# Patient Record
Sex: FEMALE
[De-identification: names, ages, dates, MRNs, and addresses within clinical notes are randomized per-mention and may not be internally consistent; named-entity substitution may affect disease eponyms.]

---

## 2021-02-10 PROBLEM — Z00.00 ENCOUNTER FOR PREVENTIVE HEALTH EXAMINATION: Status: ACTIVE | Noted: 2021-02-10

## 2021-03-16 ENCOUNTER — APPOINTMENT (OUTPATIENT)
Dept: OBGYN | Facility: CLINIC | Age: 41
End: 2021-03-16
Payer: COMMERCIAL

## 2021-03-16 ENCOUNTER — ASOB RESULT (OUTPATIENT)
Age: 41
End: 2021-03-16

## 2021-03-16 VITALS
HEIGHT: 63 IN | TEMPERATURE: 97.9 F | BODY MASS INDEX: 23.04 KG/M2 | DIASTOLIC BLOOD PRESSURE: 83 MMHG | HEART RATE: 78 BPM | SYSTOLIC BLOOD PRESSURE: 151 MMHG | WEIGHT: 130 LBS

## 2021-03-16 DIAGNOSIS — Z82.49 FAMILY HISTORY OF ISCHEMIC HEART DISEASE AND OTHER DISEASES OF THE CIRCULATORY SYSTEM: ICD-10-CM

## 2021-03-16 DIAGNOSIS — Z78.9 OTHER SPECIFIED HEALTH STATUS: ICD-10-CM

## 2021-03-16 DIAGNOSIS — Z83.3 FAMILY HISTORY OF DIABETES MELLITUS: ICD-10-CM

## 2021-03-16 PROCEDURE — 76801 OB US < 14 WKS SINGLE FETUS: CPT

## 2021-03-16 PROCEDURE — 99072 ADDL SUPL MATRL&STAF TM PHE: CPT

## 2021-03-16 PROCEDURE — 99203 OFFICE O/P NEW LOW 30 MIN: CPT

## 2021-03-18 PROBLEM — Z83.3 FAMILY HISTORY OF DIABETES MELLITUS: Status: ACTIVE | Noted: 2021-03-18

## 2021-03-18 PROBLEM — Z82.49 FAMILY HISTORY OF HYPERTENSION: Status: ACTIVE | Noted: 2021-03-18

## 2021-03-18 PROBLEM — Z78.9 DOES NOT USE ILLICIT DRUGS: Status: ACTIVE | Noted: 2021-03-18

## 2021-03-18 LAB
ABO + RH PNL BLD: NORMAL
ALBUMIN SERPL ELPH-MCNC: 4.3 G/DL
ALP BLD-CCNC: 43 U/L
ALT SERPL-CCNC: 10 U/L
ANION GAP SERPL CALC-SCNC: 13 MMOL/L
AST SERPL-CCNC: 15 U/L
B19V IGG SER QL IA: 8.69 INDEX
B19V IGG+IGM SER-IMP: NORMAL
B19V IGG+IGM SER-IMP: POSITIVE
B19V IGM FLD-ACNC: 0.16 INDEX
B19V IGM SER-ACNC: NEGATIVE
BACTERIA UR CULT: NORMAL
BASOPHILS # BLD AUTO: 0.06 K/UL
BASOPHILS NFR BLD AUTO: 0.6 %
BILIRUB SERPL-MCNC: 0.3 MG/DL
BLD GP AB SCN SERPL QL: NORMAL
BUN SERPL-MCNC: 12 MG/DL
C TRACH RRNA SPEC QL NAA+PROBE: NOT DETECTED
CALCIUM SERPL-MCNC: 9.6 MG/DL
CHLORIDE SERPL-SCNC: 99 MMOL/L
CMV IGG SERPL QL: 2.1 U/ML
CMV IGG SERPL-IMP: POSITIVE
CMV IGM SERPL QL: <8 AU/ML
CMV IGM SERPL QL: NEGATIVE
CO2 SERPL-SCNC: 23 MMOL/L
CREAT SERPL-MCNC: 0.63 MG/DL
EOSINOPHIL # BLD AUTO: 0.15 K/UL
EOSINOPHIL NFR BLD AUTO: 1.5 %
GLUCOSE SERPL-MCNC: 46 MG/DL
HBV SURFACE AG SER QL: NONREACTIVE
HCT VFR BLD CALC: 37.7 %
HCV AB SER QL: NONREACTIVE
HCV S/CO RATIO: 0.07 S/CO
HGB A MFR BLD: 97.1 %
HGB A2 MFR BLD: 2.9 %
HGB BLD-MCNC: 12.2 G/DL
HGB FRACT BLD-IMP: NORMAL
HIV1+2 AB SPEC QL IA.RAPID: NONREACTIVE
IMM GRANULOCYTES NFR BLD AUTO: 0.4 %
LEAD BLD-MCNC: <1 UG/DL
LYMPHOCYTES # BLD AUTO: 2.22 K/UL
LYMPHOCYTES NFR BLD AUTO: 22.7 %
MAN DIFF?: NORMAL
MCHC RBC-ENTMCNC: 30.3 PG
MCHC RBC-ENTMCNC: 32.4 GM/DL
MCV RBC AUTO: 93.8 FL
MONOCYTES # BLD AUTO: 0.9 K/UL
MONOCYTES NFR BLD AUTO: 9.2 %
N GONORRHOEA RRNA SPEC QL NAA+PROBE: NOT DETECTED
NEUTROPHILS # BLD AUTO: 6.43 K/UL
NEUTROPHILS NFR BLD AUTO: 65.6 %
PLATELET # BLD AUTO: 321 K/UL
POTASSIUM SERPL-SCNC: 3.9 MMOL/L
PROT SERPL-MCNC: 6.5 G/DL
RBC # BLD: 4.02 M/UL
RBC # FLD: 13.1 %
RUBV IGG FLD-ACNC: 9.6 INDEX
RUBV IGG SER-IMP: POSITIVE
SODIUM SERPL-SCNC: 135 MMOL/L
SOURCE AMPLIFICATION: NORMAL
T PALLIDUM AB SER QL IA: NEGATIVE
URATE SERPL-MCNC: 2.8 MG/DL
VZV AB TITR SER: POSITIVE
VZV IGG SER IF-ACNC: 2013 INDEX
WBC # FLD AUTO: 9.8 K/UL

## 2021-03-18 RX ORDER — LABETALOL HYDROCHLORIDE 100 MG/1
100 TABLET, FILM COATED ORAL
Qty: 60 | Refills: 0 | Status: DISCONTINUED | COMMUNITY
Start: 2020-09-28 | End: 2021-03-18

## 2021-03-25 ENCOUNTER — TRANSCRIPTION ENCOUNTER (OUTPATIENT)
Age: 41
End: 2021-03-25

## 2021-03-29 ENCOUNTER — NON-APPOINTMENT (OUTPATIENT)
Age: 41
End: 2021-03-29

## 2021-04-01 ENCOUNTER — TRANSCRIPTION ENCOUNTER (OUTPATIENT)
Age: 41
End: 2021-04-01

## 2021-04-22 ENCOUNTER — NON-APPOINTMENT (OUTPATIENT)
Age: 41
End: 2021-04-22

## 2021-04-22 ENCOUNTER — APPOINTMENT (OUTPATIENT)
Dept: OBGYN | Facility: CLINIC | Age: 41
End: 2021-04-22
Payer: COMMERCIAL

## 2021-04-22 VITALS
DIASTOLIC BLOOD PRESSURE: 86 MMHG | HEIGHT: 63 IN | BODY MASS INDEX: 23.39 KG/M2 | TEMPERATURE: 97.3 F | WEIGHT: 132 LBS | SYSTOLIC BLOOD PRESSURE: 131 MMHG

## 2021-04-22 PROCEDURE — 0501F PRENATAL FLOW SHEET: CPT

## 2021-04-23 ENCOUNTER — NON-APPOINTMENT (OUTPATIENT)
Age: 41
End: 2021-04-23

## 2021-04-23 LAB
CREAT 24H UR-MCNC: 1.3 G/24 H
CREAT 24H UR-MCNC: 1.3 G/24 H
CREAT ?TM UR-MCNC: 93 MG/DL
CREAT ?TM UR-MCNC: 93 MG/DL
PROT 24H UR-MRATE: 10 MG/DL
PROT ?TM UR-MCNC: 24 HR
PROT ?TM UR-MCNC: 24 HR
PROT UR-MCNC: 140 MG/24 H
SPECIMEN VOL 24H UR: 1400 ML
SPECIMEN VOL 24H UR: 1400 ML

## 2021-05-17 LAB
2ND TRIMESTER DATA: NORMAL
AFP PNL SERPL: NORMAL
AFP SERPL-ACNC: NORMAL
CLINICAL BIOCHEMIST REVIEW: NORMAL
NOTES NTD: NORMAL

## 2021-05-19 ENCOUNTER — APPOINTMENT (OUTPATIENT)
Dept: ANTEPARTUM | Facility: CLINIC | Age: 41
End: 2021-05-19
Payer: COMMERCIAL

## 2021-05-19 ENCOUNTER — ASOB RESULT (OUTPATIENT)
Age: 41
End: 2021-05-19

## 2021-05-19 PROCEDURE — 99072 ADDL SUPL MATRL&STAF TM PHE: CPT

## 2021-05-19 PROCEDURE — 76811 OB US DETAILED SNGL FETUS: CPT | Mod: 26

## 2021-05-27 ENCOUNTER — APPOINTMENT (OUTPATIENT)
Dept: OBGYN | Facility: CLINIC | Age: 41
End: 2021-05-27

## 2021-05-27 ENCOUNTER — NON-APPOINTMENT (OUTPATIENT)
Age: 41
End: 2021-05-27

## 2021-05-27 VITALS
TEMPERATURE: 97.3 F | WEIGHT: 138 LBS | DIASTOLIC BLOOD PRESSURE: 75 MMHG | BODY MASS INDEX: 24.45 KG/M2 | HEIGHT: 63 IN | SYSTOLIC BLOOD PRESSURE: 120 MMHG

## 2021-06-30 ENCOUNTER — APPOINTMENT (OUTPATIENT)
Dept: OBGYN | Facility: CLINIC | Age: 41
End: 2021-06-30

## 2021-07-01 ENCOUNTER — NON-APPOINTMENT (OUTPATIENT)
Age: 41
End: 2021-07-01

## 2021-07-01 ENCOUNTER — APPOINTMENT (OUTPATIENT)
Dept: ANTEPARTUM | Facility: CLINIC | Age: 41
End: 2021-07-01
Payer: COMMERCIAL

## 2021-07-01 ENCOUNTER — APPOINTMENT (OUTPATIENT)
Dept: OBGYN | Facility: CLINIC | Age: 41
End: 2021-07-01
Payer: COMMERCIAL

## 2021-07-01 ENCOUNTER — ASOB RESULT (OUTPATIENT)
Age: 41
End: 2021-07-01

## 2021-07-01 VITALS
BODY MASS INDEX: 25.52 KG/M2 | HEART RATE: 80 BPM | DIASTOLIC BLOOD PRESSURE: 91 MMHG | TEMPERATURE: 97.2 F | SYSTOLIC BLOOD PRESSURE: 145 MMHG | HEIGHT: 63 IN | WEIGHT: 144 LBS

## 2021-07-01 VITALS — DIASTOLIC BLOOD PRESSURE: 88 MMHG | SYSTOLIC BLOOD PRESSURE: 149 MMHG

## 2021-07-01 PROCEDURE — 0502F SUBSEQUENT PRENATAL CARE: CPT

## 2021-07-01 PROCEDURE — 99072 ADDL SUPL MATRL&STAF TM PHE: CPT

## 2021-07-01 PROCEDURE — 76816 OB US FOLLOW-UP PER FETUS: CPT

## 2021-07-06 LAB
BASOPHILS # BLD AUTO: 0.06 K/UL
BASOPHILS NFR BLD AUTO: 0.6 %
EOSINOPHIL # BLD AUTO: 0.25 K/UL
EOSINOPHIL NFR BLD AUTO: 2.5 %
GLUCOSE 1H P 50 G GLC PO SERPL-MCNC: 136 MG/DL
HCT VFR BLD CALC: 35.6 %
HGB BLD-MCNC: 11.5 G/DL
IMM GRANULOCYTES NFR BLD AUTO: 0.7 %
LYMPHOCYTES # BLD AUTO: 1.97 K/UL
LYMPHOCYTES NFR BLD AUTO: 19.4 %
MAN DIFF?: NORMAL
MCHC RBC-ENTMCNC: 31.5 PG
MCHC RBC-ENTMCNC: 32.3 GM/DL
MCV RBC AUTO: 97.5 FL
MONOCYTES # BLD AUTO: 0.76 K/UL
MONOCYTES NFR BLD AUTO: 7.5 %
NEUTROPHILS # BLD AUTO: 7.05 K/UL
NEUTROPHILS NFR BLD AUTO: 69.3 %
PLATELET # BLD AUTO: 282 K/UL
RBC # BLD: 3.65 M/UL
RBC # FLD: 13.8 %
T PALLIDUM AB SER QL IA: NEGATIVE
WBC # FLD AUTO: 10.16 K/UL

## 2021-07-18 ENCOUNTER — NON-APPOINTMENT (OUTPATIENT)
Age: 41
End: 2021-07-18

## 2021-07-18 ENCOUNTER — OUTPATIENT (OUTPATIENT)
Dept: INPATIENT UNIT | Facility: HOSPITAL | Age: 41
LOS: 1 days | Discharge: HOME | End: 2021-07-18
Payer: COMMERCIAL

## 2021-07-18 VITALS — HEART RATE: 82 BPM | DIASTOLIC BLOOD PRESSURE: 81 MMHG | SYSTOLIC BLOOD PRESSURE: 135 MMHG

## 2021-07-18 VITALS — HEART RATE: 76 BPM | DIASTOLIC BLOOD PRESSURE: 92 MMHG | SYSTOLIC BLOOD PRESSURE: 144 MMHG

## 2021-07-18 DIAGNOSIS — Z98.891 HISTORY OF UTERINE SCAR FROM PREVIOUS SURGERY: Chronic | ICD-10-CM

## 2021-07-18 DIAGNOSIS — O26.899 OTHER SPECIFIED PREGNANCY RELATED CONDITIONS, UNSPECIFIED TRIMESTER: ICD-10-CM

## 2021-07-18 DIAGNOSIS — Z3A.00 WEEKS OF GESTATION OF PREGNANCY NOT SPECIFIED: ICD-10-CM

## 2021-07-18 LAB
ALBUMIN SERPL ELPH-MCNC: 3.9 G/DL — SIGNIFICANT CHANGE UP (ref 3.3–5)
ALP SERPL-CCNC: 118 U/L — SIGNIFICANT CHANGE UP (ref 40–120)
ALT FLD-CCNC: 63 U/L — HIGH (ref 4–33)
ANION GAP SERPL CALC-SCNC: 17 MMOL/L — HIGH (ref 7–14)
APPEARANCE UR: ABNORMAL
APTT BLD: 27.3 SEC — SIGNIFICANT CHANGE UP (ref 27–36.3)
AST SERPL-CCNC: 42 U/L — HIGH (ref 4–32)
BACTERIA # UR AUTO: ABNORMAL
BASOPHILS # BLD AUTO: 0.04 K/UL — SIGNIFICANT CHANGE UP (ref 0–0.2)
BASOPHILS NFR BLD AUTO: 0.4 % — SIGNIFICANT CHANGE UP (ref 0–2)
BILIRUB SERPL-MCNC: 0.4 MG/DL — SIGNIFICANT CHANGE UP (ref 0.2–1.2)
BILIRUB UR-MCNC: NEGATIVE — SIGNIFICANT CHANGE UP
BUN SERPL-MCNC: 13 MG/DL — SIGNIFICANT CHANGE UP (ref 7–23)
CALCIUM SERPL-MCNC: 9.8 MG/DL — SIGNIFICANT CHANGE UP (ref 8.4–10.5)
CHLORIDE SERPL-SCNC: 101 MMOL/L — SIGNIFICANT CHANGE UP (ref 98–107)
CO2 SERPL-SCNC: 19 MMOL/L — LOW (ref 22–31)
COLOR SPEC: YELLOW — SIGNIFICANT CHANGE UP
COMMENT - URINE: SIGNIFICANT CHANGE UP
CREAT ?TM UR-MCNC: 274 MG/DL — SIGNIFICANT CHANGE UP
CREAT SERPL-MCNC: 0.76 MG/DL — SIGNIFICANT CHANGE UP (ref 0.5–1.3)
DIFF PNL FLD: NEGATIVE — SIGNIFICANT CHANGE UP
EOSINOPHIL # BLD AUTO: 0.07 K/UL — SIGNIFICANT CHANGE UP (ref 0–0.5)
EOSINOPHIL NFR BLD AUTO: 0.7 % — SIGNIFICANT CHANGE UP (ref 0–6)
EPI CELLS # UR: 6 /HPF — HIGH (ref 0–5)
FIBRINOGEN PPP-MCNC: 655 MG/DL — HIGH (ref 290–520)
GLUCOSE SERPL-MCNC: 81 MG/DL — SIGNIFICANT CHANGE UP (ref 70–99)
GLUCOSE UR QL: NEGATIVE — SIGNIFICANT CHANGE UP
HCT VFR BLD CALC: 37.7 % — SIGNIFICANT CHANGE UP (ref 34.5–45)
HGB BLD-MCNC: 12.4 G/DL — SIGNIFICANT CHANGE UP (ref 11.5–15.5)
HYALINE CASTS # UR AUTO: 7 /LPF — SIGNIFICANT CHANGE UP (ref 0–7)
IANC: 6.61 K/UL — SIGNIFICANT CHANGE UP (ref 1.5–8.5)
IMM GRANULOCYTES NFR BLD AUTO: 0.5 % — SIGNIFICANT CHANGE UP (ref 0–1.5)
INR BLD: 0.96 RATIO — SIGNIFICANT CHANGE UP (ref 0.88–1.16)
KETONES UR-MCNC: ABNORMAL
LDH SERPL L TO P-CCNC: 214 U/L — SIGNIFICANT CHANGE UP (ref 135–225)
LEUKOCYTE ESTERASE UR-ACNC: NEGATIVE — SIGNIFICANT CHANGE UP
LYMPHOCYTES # BLD AUTO: 1.97 K/UL — SIGNIFICANT CHANGE UP (ref 1–3.3)
LYMPHOCYTES # BLD AUTO: 20.2 % — SIGNIFICANT CHANGE UP (ref 13–44)
MCHC RBC-ENTMCNC: 31.3 PG — SIGNIFICANT CHANGE UP (ref 27–34)
MCHC RBC-ENTMCNC: 32.9 GM/DL — SIGNIFICANT CHANGE UP (ref 32–36)
MCV RBC AUTO: 95.2 FL — SIGNIFICANT CHANGE UP (ref 80–100)
MONOCYTES # BLD AUTO: 0.99 K/UL — HIGH (ref 0–0.9)
MONOCYTES NFR BLD AUTO: 10.2 % — SIGNIFICANT CHANGE UP (ref 2–14)
NEUTROPHILS # BLD AUTO: 6.61 K/UL — SIGNIFICANT CHANGE UP (ref 1.8–7.4)
NEUTROPHILS NFR BLD AUTO: 68 % — SIGNIFICANT CHANGE UP (ref 43–77)
NITRITE UR-MCNC: NEGATIVE — SIGNIFICANT CHANGE UP
NRBC # BLD: 0 /100 WBCS — SIGNIFICANT CHANGE UP
NRBC # FLD: 0 K/UL — SIGNIFICANT CHANGE UP
PH UR: 6 — SIGNIFICANT CHANGE UP (ref 5–8)
PLATELET # BLD AUTO: 313 K/UL — SIGNIFICANT CHANGE UP (ref 150–400)
POTASSIUM SERPL-MCNC: 4 MMOL/L — SIGNIFICANT CHANGE UP (ref 3.5–5.3)
POTASSIUM SERPL-SCNC: 4 MMOL/L — SIGNIFICANT CHANGE UP (ref 3.5–5.3)
PROT ?TM UR-MCNC: 43 MG/DL — SIGNIFICANT CHANGE UP
PROT ?TM UR-MCNC: 43 MG/DL — SIGNIFICANT CHANGE UP
PROT SERPL-MCNC: 7.4 G/DL — SIGNIFICANT CHANGE UP (ref 6–8.3)
PROT UR-MCNC: ABNORMAL
PROT/CREAT UR-RTO: 0.2 RATIO — SIGNIFICANT CHANGE UP (ref 0–0.2)
PROTHROM AB SERPL-ACNC: 11.1 SEC — SIGNIFICANT CHANGE UP (ref 10.6–13.6)
RBC # BLD: 3.96 M/UL — SIGNIFICANT CHANGE UP (ref 3.8–5.2)
RBC # FLD: 13.2 % — SIGNIFICANT CHANGE UP (ref 10.3–14.5)
RBC CASTS # UR COMP ASSIST: 1 /HPF — SIGNIFICANT CHANGE UP (ref 0–4)
SODIUM SERPL-SCNC: 137 MMOL/L — SIGNIFICANT CHANGE UP (ref 135–145)
SP GR SPEC: 1.03 — HIGH (ref 1.01–1.02)
URATE SERPL-MCNC: 5.2 MG/DL — SIGNIFICANT CHANGE UP (ref 2.5–7)
UROBILINOGEN FLD QL: ABNORMAL
WBC # BLD: 9.73 K/UL — SIGNIFICANT CHANGE UP (ref 3.8–10.5)
WBC # FLD AUTO: 9.73 K/UL — SIGNIFICANT CHANGE UP (ref 3.8–10.5)
WBC UR QL: 5 /HPF — SIGNIFICANT CHANGE UP (ref 0–5)

## 2021-07-18 PROCEDURE — 99214 OFFICE O/P EST MOD 30 MIN: CPT

## 2021-07-18 RX ORDER — LABETALOL HCL 100 MG
200 TABLET ORAL ONCE
Refills: 0 | Status: COMPLETED | OUTPATIENT
Start: 2021-07-18 | End: 2021-07-18

## 2021-07-18 RX ORDER — SIMETHICONE 80 MG/1
160 TABLET, CHEWABLE ORAL ONCE
Refills: 0 | Status: COMPLETED | OUTPATIENT
Start: 2021-07-18 | End: 2021-07-18

## 2021-07-18 RX ADMIN — Medication 200 MILLIGRAM(S): at 17:44

## 2021-07-18 RX ADMIN — SIMETHICONE 160 MILLIGRAM(S): 80 TABLET, CHEWABLE ORAL at 19:03

## 2021-07-18 NOTE — OB PROVIDER TRIAGE NOTE - NSHPLABSRESULTS_GEN_ALL_CORE
[facility] HELLP LABS                          12.4   9.73  )-----------( 313      ( 2021 18:13 )             37.7         137  |  101  |  13  ----------------------------<  81  4.0   |  19<L>  |  0.76    Ca    9.8      2021 18:13    TPro  7.4  /  Alb  3.9  /  TBili  0.4  /  DBili  x   /  AST  42<H>  /  ALT  63<H>  /  AlkPhos  118            Urinalysis Basic - ( 2021 18:13 )    Color: Yellow / Appearance: Slightly Turbid / S.032 / pH: x  Gluc: x / Ketone: Moderate  / Bili: Negative / Urobili: 3 mg/dL   Blood: x / Protein: 30 mg/dL / Nitrite: Negative   Leuk Esterase: Negative / RBC: x / WBC x   Sq Epi: x / Non Sq Epi: x / Bacteria: x      PT/INR - ( 2021 18:13 )   PT: 11.1 sec;   INR: 0.96 ratio         PTT - ( 2021 18:13 )  PTT:27.3 sec      P/c ratio 0.2    Fibrinogen Assay Fibrinogen Assay: 655 mg/dL (21 @ 18:13)

## 2021-07-18 NOTE — OB PROVIDER TRIAGE NOTE - HISTORY OF PRESENT ILLNESS
PNC Provider:  Dr Vincent  EDC:  10/13/2021  Patient is a 39y/o  @ 27 4/7wks gestation who presents to triage with c/o nausea, vomiting x 4 and decreased fetal movements.  Last good fetal movements felt 2 days ago (Friday night).  Patient reports that she has always had nausea which she manages with bay root tea and saltine crackers.  The vomiting is new and she had 3 episodes.  She has had crackers, bay tea and strawberries with no emesis since then.  Patient is known cHTN on labetalol and nifedipine.  Elevated BPs noted in triage.  Denies visual disturbances, ruq/epigastric pain.  AP Course affected by cHTN  Meds:  pnv, ASA 81mg, labetalol 200mg bid and procardia 60 ER .  Last took labetalol and procardia at 1100.  NKDA  COVID-19:  PMHx - cHTH; dx'ed   PSHx - c/s          - d&c  OB - C/S for severe pec - 2020 - 2.5lbs at [facility]       - Missed ab with d&c 3/2020   PNC Provider:  Dr Vincent  EDC:  10/13/2021  Patient is a 41y/o  @ 27 4/7wks gestation who presents to triage with c/o nausea, vomiting x 4 and decreased fetal movements.  Last good fetal movements felt 2 days ago (Friday night).  Patient reports that she has always had nausea which she manages with bay root tea and saltine crackers.  The vomiting is new and she had 3 episodes.  She has had crackers, bay tea and strawberries with no emesis since then.  Patient is known cHTN on labetalol and nifedipine.  Elevated BPs noted in triage.  Denies visual disturbances, ruq/epigastric pain.  AP Course affected by cHTN  24h uine done 3/16/21 - 140mg/24hr  Meds:  pnv, ASA 81mg, labetalol 200mg bid and procardia 60 ER .  Last took labetalol and procardia at 1100.  NKDA  COVID-19:  PMHx - cHTH; dx'ed   PSHx - c/s          - d&c  OB - C/S for severe pec - 2020 - 2.5lbs at [facility]       - Missed ab with d&c 3/2020

## 2021-07-18 NOTE — OB PROVIDER TRIAGE NOTE - NSHPPHYSICALEXAM_GEN_ALL_CORE
Vital Signs Last 24 Hrs  T(C): 37 (18 Jul 2021 16:23), Max: 37 (18 Jul 2021 16:23)  T(F): 98.6 (18 Jul 2021 16:23), Max: 98.6 (18 Jul 2021 16:23)  HR: 71 (18 Jul 2021 17:56) (68 - 76)  BP: 138/87 (18 Jul 2021 17:56) (134/84 - 150/-)  BP(mean): --  RR: 16 (18 Jul 2021 16:23) (16 - 16)  SpO2: --    Abdomen gravid, soft and nontender  NST -   TAS - Vtx/post plac/aafv.  FHR 182bpm  BPP- 8/8  SVE - deferred  administer Labetalol 200mg @ 1744;  /87 HR 71

## 2021-07-18 NOTE — OB PROVIDER TRIAGE NOTE - ADDITIONAL INSTRUCTIONS
Increase labetalol 200mg tid.  Continue procardia 60mg EF as prescribed.  To f/u for BP check on Tue/Wed.

## 2021-07-18 NOTE — OB PROVIDER TRIAGE NOTE - NSOBPROVIDERNOTE_OBGYN_ALL_OB_FT
Discussed patient with Dr Levin.  Plan:  administer Labetalol 200mg @ 1744,    Awaiting lab results. Discussed patient with Dr Levin.  Plan:  administer Labetalol 200mg @ 1744,    Awaiting lab results.    Plan: 1830.  If cmp is normal, discharge patint home.   Increase labetalol 200mg tid.  Continue procardia 60mg EF as prescribed.  To f/u for BP check on Tue/Wed.  1850 s/p po saltines and water; no emesis  Patient is cleared for discharge home elevated ast/alt; asymptomatic  Encouraged to f/u on Tue/Wed.  Patient reports "gas" simethicone ordered

## 2021-07-20 ENCOUNTER — OUTPATIENT (OUTPATIENT)
Dept: OUTPATIENT SERVICES | Facility: HOSPITAL | Age: 41
LOS: 1 days | Discharge: HOME | End: 2021-07-20
Payer: COMMERCIAL

## 2021-07-20 ENCOUNTER — NON-APPOINTMENT (OUTPATIENT)
Age: 41
End: 2021-07-20

## 2021-07-20 VITALS — TEMPERATURE: 99 F | RESPIRATION RATE: 16 BRPM

## 2021-07-20 DIAGNOSIS — Z98.891 HISTORY OF UTERINE SCAR FROM PREVIOUS SURGERY: Chronic | ICD-10-CM

## 2021-07-20 PROCEDURE — 76816 OB US FOLLOW-UP PER FETUS: CPT | Mod: 26

## 2021-07-20 PROCEDURE — 99214 OFFICE O/P EST MOD 30 MIN: CPT | Mod: 25

## 2021-07-20 PROCEDURE — 76817 TRANSVAGINAL US OBSTETRIC: CPT | Mod: 26

## 2021-07-20 PROCEDURE — 76818 FETAL BIOPHYS PROFILE W/NST: CPT | Mod: 26

## 2021-07-20 RX ORDER — SODIUM CHLORIDE 9 MG/ML
1000 INJECTION, SOLUTION INTRAVENOUS ONCE
Refills: 0 | Status: COMPLETED | OUTPATIENT
Start: 2021-07-20 | End: 2021-07-20

## 2021-07-20 RX ADMIN — SODIUM CHLORIDE 2000 MILLILITER(S): 9 INJECTION, SOLUTION INTRAVENOUS at 23:52

## 2021-07-20 NOTE — OB RN TRIAGE NOTE - CHIEF COMPLAINT QUOTE
n/v x 6 days, last vomit was stephanie morning  8/10 pain from left side to the right side- in the front

## 2021-07-21 ENCOUNTER — APPOINTMENT (OUTPATIENT)
Dept: OBGYN | Facility: CLINIC | Age: 41
End: 2021-07-21

## 2021-07-21 VITALS — DIASTOLIC BLOOD PRESSURE: 81 MMHG | SYSTOLIC BLOOD PRESSURE: 137 MMHG | HEART RATE: 82 BPM

## 2021-07-21 PROBLEM — I10 ESSENTIAL (PRIMARY) HYPERTENSION: Chronic | Status: ACTIVE | Noted: 2021-07-18

## 2021-07-21 LAB
ALBUMIN SERPL ELPH-MCNC: 3.9 G/DL — SIGNIFICANT CHANGE UP (ref 3.3–5)
ALP SERPL-CCNC: 114 U/L — SIGNIFICANT CHANGE UP (ref 40–120)
ALT FLD-CCNC: 87 U/L — HIGH (ref 4–33)
AMYLASE P1 CFR SERPL: 110 U/L — SIGNIFICANT CHANGE UP (ref 25–125)
ANION GAP SERPL CALC-SCNC: 12 MMOL/L — SIGNIFICANT CHANGE UP (ref 7–14)
APPEARANCE UR: CLEAR — SIGNIFICANT CHANGE UP
AST SERPL-CCNC: 48 U/L — HIGH (ref 4–32)
BACTERIA # UR AUTO: NEGATIVE — SIGNIFICANT CHANGE UP
BASOPHILS # BLD AUTO: 0.04 K/UL — SIGNIFICANT CHANGE UP (ref 0–0.2)
BASOPHILS NFR BLD AUTO: 0.5 % — SIGNIFICANT CHANGE UP (ref 0–2)
BILIRUB SERPL-MCNC: 0.2 MG/DL — SIGNIFICANT CHANGE UP (ref 0.2–1.2)
BILIRUB UR-MCNC: NEGATIVE — SIGNIFICANT CHANGE UP
BUN SERPL-MCNC: 8 MG/DL — SIGNIFICANT CHANGE UP (ref 7–23)
CALCIUM SERPL-MCNC: 9.5 MG/DL — SIGNIFICANT CHANGE UP (ref 8.4–10.5)
CHLORIDE SERPL-SCNC: 103 MMOL/L — SIGNIFICANT CHANGE UP (ref 98–107)
CO2 SERPL-SCNC: 21 MMOL/L — LOW (ref 22–31)
COLOR SPEC: YELLOW — SIGNIFICANT CHANGE UP
CREAT SERPL-MCNC: 0.7 MG/DL — SIGNIFICANT CHANGE UP (ref 0.5–1.3)
DIFF PNL FLD: NEGATIVE — SIGNIFICANT CHANGE UP
EOSINOPHIL # BLD AUTO: 0.12 K/UL — SIGNIFICANT CHANGE UP (ref 0–0.5)
EOSINOPHIL NFR BLD AUTO: 1.4 % — SIGNIFICANT CHANGE UP (ref 0–6)
EPI CELLS # UR: 2 /HPF — SIGNIFICANT CHANGE UP (ref 0–5)
GLUCOSE SERPL-MCNC: 85 MG/DL — SIGNIFICANT CHANGE UP (ref 70–99)
GLUCOSE UR QL: ABNORMAL
HCT VFR BLD CALC: 35.3 % — SIGNIFICANT CHANGE UP (ref 34.5–45)
HGB BLD-MCNC: 11.7 G/DL — SIGNIFICANT CHANGE UP (ref 11.5–15.5)
HYALINE CASTS # UR AUTO: 1 /LPF — SIGNIFICANT CHANGE UP (ref 0–7)
IANC: 4.83 K/UL — SIGNIFICANT CHANGE UP (ref 1.5–8.5)
IMM GRANULOCYTES NFR BLD AUTO: 0.6 % — SIGNIFICANT CHANGE UP (ref 0–1.5)
KETONES UR-MCNC: NEGATIVE — SIGNIFICANT CHANGE UP
LEUKOCYTE ESTERASE UR-ACNC: NEGATIVE — SIGNIFICANT CHANGE UP
LIDOCAIN IGE QN: 25 U/L — SIGNIFICANT CHANGE UP (ref 7–60)
LYMPHOCYTES # BLD AUTO: 2.46 K/UL — SIGNIFICANT CHANGE UP (ref 1–3.3)
LYMPHOCYTES # BLD AUTO: 29.5 % — SIGNIFICANT CHANGE UP (ref 13–44)
MCHC RBC-ENTMCNC: 31.4 PG — SIGNIFICANT CHANGE UP (ref 27–34)
MCHC RBC-ENTMCNC: 33.1 GM/DL — SIGNIFICANT CHANGE UP (ref 32–36)
MCV RBC AUTO: 94.6 FL — SIGNIFICANT CHANGE UP (ref 80–100)
MONOCYTES # BLD AUTO: 0.83 K/UL — SIGNIFICANT CHANGE UP (ref 0–0.9)
MONOCYTES NFR BLD AUTO: 10 % — SIGNIFICANT CHANGE UP (ref 2–14)
NEUTROPHILS # BLD AUTO: 4.83 K/UL — SIGNIFICANT CHANGE UP (ref 1.8–7.4)
NEUTROPHILS NFR BLD AUTO: 58 % — SIGNIFICANT CHANGE UP (ref 43–77)
NITRITE UR-MCNC: NEGATIVE — SIGNIFICANT CHANGE UP
NRBC # BLD: 0 /100 WBCS — SIGNIFICANT CHANGE UP
NRBC # FLD: 0 K/UL — SIGNIFICANT CHANGE UP
PH UR: 6.5 — SIGNIFICANT CHANGE UP (ref 5–8)
PLATELET # BLD AUTO: 256 K/UL — SIGNIFICANT CHANGE UP (ref 150–400)
POTASSIUM SERPL-MCNC: 4.1 MMOL/L — SIGNIFICANT CHANGE UP (ref 3.5–5.3)
POTASSIUM SERPL-SCNC: 4.1 MMOL/L — SIGNIFICANT CHANGE UP (ref 3.5–5.3)
PROT SERPL-MCNC: 6.7 G/DL — SIGNIFICANT CHANGE UP (ref 6–8.3)
PROT UR-MCNC: ABNORMAL
RBC # BLD: 3.73 M/UL — LOW (ref 3.8–5.2)
RBC # FLD: 13.2 % — SIGNIFICANT CHANGE UP (ref 10.3–14.5)
RBC CASTS # UR COMP ASSIST: 1 /HPF — SIGNIFICANT CHANGE UP (ref 0–4)
SODIUM SERPL-SCNC: 136 MMOL/L — SIGNIFICANT CHANGE UP (ref 135–145)
SP GR SPEC: 1.01 — SIGNIFICANT CHANGE UP (ref 1.01–1.02)
UROBILINOGEN FLD QL: SIGNIFICANT CHANGE UP
WBC # BLD: 8.33 K/UL — SIGNIFICANT CHANGE UP (ref 3.8–10.5)
WBC # FLD AUTO: 8.33 K/UL — SIGNIFICANT CHANGE UP (ref 3.8–10.5)
WBC UR QL: 1 /HPF — SIGNIFICANT CHANGE UP (ref 0–5)

## 2021-07-21 PROCEDURE — 76705 ECHO EXAM OF ABDOMEN: CPT | Mod: 26

## 2021-07-21 RX ORDER — LABETALOL HCL 100 MG
200 TABLET ORAL ONCE
Refills: 0 | Status: COMPLETED | OUTPATIENT
Start: 2021-07-21 | End: 2021-07-21

## 2021-07-21 RX ADMIN — Medication 200 MILLIGRAM(S): at 02:07

## 2021-07-21 NOTE — OB PROVIDER TRIAGE NOTE - NSHPLABSRESULTS_GEN_ALL_CORE
11.7   8.33  )-----------( 256      ( 2021 01:03 )             35.3   07-    136  |  103  |  8   ----------------------------<  85  4.1   |  21<L>  |  0.70    Ca    9.5      2021 01:03    TPro  6.7  /  Alb  3.9  /  TBili  0.2  /  DBili  x   /  AST  48<H>  /  ALT  87<H>  /  AlkPhos  114    Urinalysis Basic - ( 2021 01:03 )    Color: Yellow / Appearance: Clear / S.014 / pH: x  Gluc: x / Ketone: Negative  / Bili: Negative / Urobili: <2 mg/dL   Blood: x / Protein: Trace / Nitrite: Negative   Leuk Esterase: Negative / RBC: 1 /HPF / WBC 1 /HPF   Sq Epi: x / Non Sq Epi: 2 /HPF / Bacteria: Negative

## 2021-07-21 NOTE — OB PROVIDER TRIAGE NOTE - ADDITIONAL INSTRUCTIONS
d/w Dr Browne discharge home @ 28 wks s/p n/v  maternal and fetal surveillance reassuring  rest activity as tolerated  increase fluid intake  keep all OB appointments  3 hr glucose test today  take simethicone for gas   Call Ob office today to schedule an earlier appointment  if have vomiting or pain call OB for evaluation  return for vaginal bleeding leakage of fluid decreased fetal movement or any concerns  v/w discharge instructions with verbal understanding by patient

## 2021-07-21 NOTE — OB PROVIDER TRIAGE NOTE - NSHPPHYSICALEXAM_GEN_ALL_CORE
LS clear bilaterally  abd soft gravid NT, negative CVA tenderness LS clear bilaterally  abd soft gravid NT, negative CVA tenderness bilaterally, negative hanson sign, negative psoas, negative rebound  CV RRR  FHT: baseline 140, moderate variability, 10x10 accels, negative decels  toco: none felt, uterine irritability before and after voiding  TAS:  vertex  posterior placenta  BPP 8/8  LUCERO: 18.7  EFW:990.7g/2#3  SSE: cx closed no pooling fluid or bleeding  Vital Signs Last 24 Hrs  T(C): 37.2 (20 Jul 2021 23:10), Max: 37.2 (20 Jul 2021 23:06)  T(F): 99 (20 Jul 2021 23:10), Max: 99 (20 Jul 2021 23:10)  HR: 82 (21 Jul 2021 04:16) (71 - 82)  BP: 137/- (21 Jul 2021 04:16) (132/81 - 137/85)  BP(mean): --  RR: 16 (20 Jul 2021 23:10) (16 - 16)  SpO2: --

## 2021-07-21 NOTE — OB PROVIDER TRIAGE NOTE - NSOBPROVIDERNOTE_OBGYN_ALL_OB_FT
abd u/s r/o gallbladder/appendix  labs ua  IVF LR 1 liter bolus abd u/s r/o gallbladder/appendix  labs ua  IVF LR 1 liter bolus  pt requesting her labetalol dose abd u/s r/o gallbladder/appendix  labs ua  IVF LR 1 liter bolus  pt requesting her labetalol dose 200mg PO ordered x 1  comfortable sleeping tolerating oral PO/ Last emesis Sunday    reviewed FHT with Dr Gonzalez 10 x10 accels observed, approved FHT    d/w Dr Browne discharge home @ 28 wks s/p n/v  maternal and fetal surveillance reassuring  rest activity as tolerated  increase fluid intake  keep all OB appointments  3 hr glucose test today  take simethicone for gas   Call Ob office today to schedule an earlier appointment  if have vomiting or pain call OB for evaluation  return for vaginal bleeding leakage of fluid decreased fetal movement or any concerns  v/w discharge instructions with verbal understanding by patient

## 2021-07-21 NOTE — OB PROVIDER TRIAGE NOTE - HISTORY OF PRESENT ILLNESS
41 yo AMA  @ 27.6 wks c/on/v x6 days, nausea the entire pregnancy vomiting x 6 days last emesis - seen in D&T  for same labs and urine sent pt with relief by simethicone and d/c home. pt reports still has pain from llq to rlq and requesting to have gallbladder checked. denies vb lof or contractions, reports +GFM. AP course complicated by chronic htn, failed 1 hr glucose to have 3 hr test tomorrow. denies fever chills ha dysuria constipation cp sob sough vision changes epigatsric pain. pt checks BP at home.    GBS: unsure  meds: labetalol 200mg 3 x day, Nifedipine 60 mg daily? ASA PNV  all: denies  PMH: chronic HTN hemorrhoids  PSH: c/s   gyn hx: denies  ob hx: 2020 primary c/s 2/ PEC @ 29 wks NICU 2#5, 2019  39 yo JEET  @ 27.6 wks c/on/v x6 days, nausea the entire pregnancy vomiting x 6 days last emesis - seen in D&T  for same labs and urine sent pt with relief by simethicone and d/c home. pt reports still has pain from llq to rlq and requesting to have gallbladder checked. denies vb lof or contractions, reports +GFM. AP course complicated by chronic htn, failed 1 hr glucose to have 3 hr test tomorrow. denies fever chills ha dysuria constipation cp sob cough vision changes epigastric pain. pt checks BP at home. + flatus, normal BMs. pt reports drinks less water 2/2 nausea she has had throughout pregnancy.    GBS: unsure  meds: labetalol 200mg 3 x day, Nifedipine 60 mg daily? ASA PNV  all: denies  PMH: chronic HTN hemorrhoids  PSH: c/s   gyn hx: denies  ob hx: 2020 primary c/s 2/2 PEC @ 29 wks NICU 2#5, 2019

## 2021-07-23 ENCOUNTER — APPOINTMENT (OUTPATIENT)
Dept: OBGYN | Facility: CLINIC | Age: 41
End: 2021-07-23

## 2021-07-23 ENCOUNTER — APPOINTMENT (OUTPATIENT)
Dept: ANTEPARTUM | Facility: CLINIC | Age: 41
End: 2021-07-23
Payer: COMMERCIAL

## 2021-07-23 ENCOUNTER — NON-APPOINTMENT (OUTPATIENT)
Age: 41
End: 2021-07-23

## 2021-07-23 ENCOUNTER — ASOB RESULT (OUTPATIENT)
Age: 41
End: 2021-07-23

## 2021-07-23 ENCOUNTER — INPATIENT (INPATIENT)
Facility: HOSPITAL | Age: 41
LOS: 21 days | Discharge: HOME | End: 2021-08-14
Attending: OBSTETRICS & GYNECOLOGY | Admitting: OBSTETRICS & GYNECOLOGY
Payer: COMMERCIAL

## 2021-07-23 VITALS
RESPIRATION RATE: 16 BRPM | SYSTOLIC BLOOD PRESSURE: 120 MMHG | TEMPERATURE: 98 F | DIASTOLIC BLOOD PRESSURE: 65 MMHG | HEART RATE: 84 BPM

## 2021-07-23 VITALS — HEIGHT: 63 IN

## 2021-07-23 DIAGNOSIS — Z3A.00 WEEKS OF GESTATION OF PREGNANCY NOT SPECIFIED: ICD-10-CM

## 2021-07-23 DIAGNOSIS — Z98.891 HISTORY OF UTERINE SCAR FROM PREVIOUS SURGERY: Chronic | ICD-10-CM

## 2021-07-23 DIAGNOSIS — O26.899 OTHER SPECIFIED PREGNANCY RELATED CONDITIONS, UNSPECIFIED TRIMESTER: ICD-10-CM

## 2021-07-23 DIAGNOSIS — Z98.890 OTHER SPECIFIED POSTPROCEDURAL STATES: Chronic | ICD-10-CM

## 2021-07-23 LAB
ALBUMIN SERPL ELPH-MCNC: 3.6 G/DL — SIGNIFICANT CHANGE UP (ref 3.3–5)
ALP SERPL-CCNC: 110 U/L — SIGNIFICANT CHANGE UP (ref 40–120)
ALT FLD-CCNC: 73 U/L — HIGH (ref 4–33)
ANION GAP SERPL CALC-SCNC: 13 MMOL/L — SIGNIFICANT CHANGE UP (ref 7–14)
APPEARANCE UR: ABNORMAL
APTT BLD: 27 SEC — SIGNIFICANT CHANGE UP (ref 27–36.3)
AST SERPL-CCNC: 41 U/L — HIGH (ref 4–32)
BACTERIA # UR AUTO: ABNORMAL
BASOPHILS # BLD AUTO: 0.03 K/UL — SIGNIFICANT CHANGE UP (ref 0–0.2)
BASOPHILS NFR BLD AUTO: 0.4 % — SIGNIFICANT CHANGE UP (ref 0–2)
BILIRUB SERPL-MCNC: <0.2 MG/DL — SIGNIFICANT CHANGE UP (ref 0.2–1.2)
BILIRUB UR-MCNC: NEGATIVE — SIGNIFICANT CHANGE UP
BUN SERPL-MCNC: 10 MG/DL — SIGNIFICANT CHANGE UP (ref 7–23)
CALCIUM SERPL-MCNC: 9 MG/DL — SIGNIFICANT CHANGE UP (ref 8.4–10.5)
CHLORIDE SERPL-SCNC: 104 MMOL/L — SIGNIFICANT CHANGE UP (ref 98–107)
CO2 SERPL-SCNC: 20 MMOL/L — LOW (ref 22–31)
COLOR SPEC: YELLOW — SIGNIFICANT CHANGE UP
CREAT ?TM UR-MCNC: 219 MG/DL — SIGNIFICANT CHANGE UP
CREAT SERPL-MCNC: 0.71 MG/DL — SIGNIFICANT CHANGE UP (ref 0.5–1.3)
DIFF PNL FLD: NEGATIVE — SIGNIFICANT CHANGE UP
EOSINOPHIL # BLD AUTO: 0.16 K/UL — SIGNIFICANT CHANGE UP (ref 0–0.5)
EOSINOPHIL NFR BLD AUTO: 2 % — SIGNIFICANT CHANGE UP (ref 0–6)
EPI CELLS # UR: 20 /HPF — HIGH (ref 0–5)
FIBRINOGEN PPP-MCNC: 661 MG/DL — HIGH (ref 290–520)
GLUCOSE 1H P 100 G GLC PO SERPL-MCNC: 117 MG/DL
GLUCOSE 2H P CHAL SERPL-MCNC: 106 MG/DL
GLUCOSE 3H P CHAL SERPL-MCNC: 95 MG/DL
GLUCOSE BS SERPL-MCNC: 94 MG/DL
GLUCOSE SERPL-MCNC: 113 MG/DL — HIGH (ref 70–99)
GLUCOSE UR QL: ABNORMAL
HCT VFR BLD CALC: 32.7 % — LOW (ref 34.5–45)
HGB BLD-MCNC: 10.9 G/DL — LOW (ref 11.5–15.5)
HYALINE CASTS # UR AUTO: 1 /LPF — SIGNIFICANT CHANGE UP (ref 0–7)
IANC: 4.75 K/UL — SIGNIFICANT CHANGE UP (ref 1.5–8.5)
IMM GRANULOCYTES NFR BLD AUTO: 0.5 % — SIGNIFICANT CHANGE UP (ref 0–1.5)
INR BLD: 1 RATIO — SIGNIFICANT CHANGE UP (ref 0.88–1.16)
KETONES UR-MCNC: ABNORMAL
LDH SERPL L TO P-CCNC: 175 U/L — SIGNIFICANT CHANGE UP (ref 135–225)
LEUKOCYTE ESTERASE UR-ACNC: NEGATIVE — SIGNIFICANT CHANGE UP
LYMPHOCYTES # BLD AUTO: 2.17 K/UL — SIGNIFICANT CHANGE UP (ref 1–3.3)
LYMPHOCYTES # BLD AUTO: 27.4 % — SIGNIFICANT CHANGE UP (ref 13–44)
MCHC RBC-ENTMCNC: 31.6 PG — SIGNIFICANT CHANGE UP (ref 27–34)
MCHC RBC-ENTMCNC: 33.3 GM/DL — SIGNIFICANT CHANGE UP (ref 32–36)
MCV RBC AUTO: 94.8 FL — SIGNIFICANT CHANGE UP (ref 80–100)
MONOCYTES # BLD AUTO: 0.78 K/UL — SIGNIFICANT CHANGE UP (ref 0–0.9)
MONOCYTES NFR BLD AUTO: 9.8 % — SIGNIFICANT CHANGE UP (ref 2–14)
NEUTROPHILS # BLD AUTO: 4.75 K/UL — SIGNIFICANT CHANGE UP (ref 1.8–7.4)
NEUTROPHILS NFR BLD AUTO: 59.9 % — SIGNIFICANT CHANGE UP (ref 43–77)
NITRITE UR-MCNC: NEGATIVE — SIGNIFICANT CHANGE UP
NRBC # BLD: 0 /100 WBCS — SIGNIFICANT CHANGE UP
NRBC # FLD: 0 K/UL — SIGNIFICANT CHANGE UP
PH UR: 6 — SIGNIFICANT CHANGE UP (ref 5–8)
PLATELET # BLD AUTO: 228 K/UL — SIGNIFICANT CHANGE UP (ref 150–400)
POTASSIUM SERPL-MCNC: 3.8 MMOL/L — SIGNIFICANT CHANGE UP (ref 3.5–5.3)
POTASSIUM SERPL-SCNC: 3.8 MMOL/L — SIGNIFICANT CHANGE UP (ref 3.5–5.3)
PROT ?TM UR-MCNC: 51 MG/DL — SIGNIFICANT CHANGE UP
PROT ?TM UR-MCNC: 51 MG/DL — SIGNIFICANT CHANGE UP
PROT SERPL-MCNC: 6.5 G/DL — SIGNIFICANT CHANGE UP (ref 6–8.3)
PROT UR-MCNC: ABNORMAL
PROT/CREAT UR-RTO: 0.2 RATIO — SIGNIFICANT CHANGE UP (ref 0–0.2)
PROTHROM AB SERPL-ACNC: 11.5 SEC — SIGNIFICANT CHANGE UP (ref 10.6–13.6)
RBC # BLD: 3.45 M/UL — LOW (ref 3.8–5.2)
RBC # FLD: 12.9 % — SIGNIFICANT CHANGE UP (ref 10.3–14.5)
RBC CASTS # UR COMP ASSIST: 3 /HPF — SIGNIFICANT CHANGE UP (ref 0–4)
SODIUM SERPL-SCNC: 137 MMOL/L — SIGNIFICANT CHANGE UP (ref 135–145)
SP GR SPEC: 1.03 — HIGH (ref 1.01–1.02)
URATE SERPL-MCNC: 4.5 MG/DL — SIGNIFICANT CHANGE UP (ref 2.5–7)
UROBILINOGEN FLD QL: ABNORMAL
WBC # BLD: 7.93 K/UL — SIGNIFICANT CHANGE UP (ref 3.8–10.5)
WBC # FLD AUTO: 7.93 K/UL — SIGNIFICANT CHANGE UP (ref 3.8–10.5)
WBC UR QL: 9 /HPF — HIGH (ref 0–5)

## 2021-07-23 PROCEDURE — 99242 OFF/OP CONSLTJ NEW/EST SF 20: CPT

## 2021-07-23 PROCEDURE — 76816 OB US FOLLOW-UP PER FETUS: CPT

## 2021-07-23 PROCEDURE — 99072 ADDL SUPL MATRL&STAF TM PHE: CPT

## 2021-07-23 PROCEDURE — 76819 FETAL BIOPHYS PROFIL W/O NST: CPT

## 2021-07-23 RX ORDER — NIFEDIPINE 30 MG
10 TABLET, EXTENDED RELEASE 24 HR ORAL ONCE
Refills: 0 | Status: COMPLETED | OUTPATIENT
Start: 2021-07-23 | End: 2021-07-24

## 2021-07-23 RX ORDER — MAGNESIUM SULFATE 500 MG/ML
2 VIAL (ML) INJECTION
Qty: 40 | Refills: 0 | Status: DISCONTINUED | OUTPATIENT
Start: 2021-07-23 | End: 2021-07-24

## 2021-07-23 RX ORDER — LABETALOL HCL 100 MG
1 TABLET ORAL
Qty: 0 | Refills: 0 | DISCHARGE

## 2021-07-23 RX ORDER — MAGNESIUM SULFATE 500 MG/ML
4 VIAL (ML) INJECTION ONCE
Refills: 0 | Status: COMPLETED | OUTPATIENT
Start: 2021-07-23 | End: 2021-07-24

## 2021-07-23 RX ORDER — LABETALOL HCL 100 MG
200 TABLET ORAL ONCE
Refills: 0 | Status: COMPLETED | OUTPATIENT
Start: 2021-07-23 | End: 2021-07-23

## 2021-07-23 RX ADMIN — Medication 200 MILLIGRAM(S): at 22:57

## 2021-07-23 NOTE — OB PROVIDER TRIAGE NOTE - NSOBPROVIDERNOTE_OBGYN_ALL_OB_FT
Discussed findings with Dr. Levin. Plan for [facility] consult.   Dr. Gonzalez made aware.

## 2021-07-23 NOTE — OB PROVIDER TRIAGE NOTE - NSHPPHYSICALEXAM_GEN_ALL_CORE
ICU Vital Signs Last 24 Hrs  T(C): 36.9 (23 Jul 2021 15:59), Max: 36.9 (23 Jul 2021 15:59)  T(F): 98.4 (23 Jul 2021 15:59), Max: 98.4 (23 Jul 2021 15:59)  HR: 75 (23 Jul 2021 23:28) (69 - 84)  BP: 146/73 (23 Jul 2021 23:28) (120/65 - 172/94)  BP(mean): --  ABP: --  ABP(mean): --  RR: 16 (23 Jul 2021 15:59) (16 - 16)  SpO2: --    Abdomen soft nontender  TAS: ATU scan

## 2021-07-23 NOTE — CHART NOTE - NSCHARTNOTEFT_GEN_A_CORE
R3 OB Chart Note    41yo  @ 28w2d (by LMP (21) SAKINA 10/13/21) presenting from office for prolonged monitoring, serial BPs, HELLP labs, with possible BMZ if needed in setting of newly diagnosed IUGR (2%, AC <3%) with AEDV, SD 8.78.     Pt evaluated at bedside. No acute complaints. Denies HA, vision changes, RUQ or epigastric, body swelling, CP, SOB, fevers/chills. Reports recent evaluations in D&T for c/o nausea/vomiting, improved at this time.    PNC: [facility]. No reported genetic/sono fetal abnl. Pregnancy c/b AMA, cHTN (dx , Labetalol 200mg TID, Procardia XL 60mg), h/o previous 29w delivery via C/S 2/2 siPEC w/severe features.    OBHx:  G1 2019 MAB s/p D&C  G2  primary C/S c/b siPEC w/severe features    GYNHx: denies fibroids, ov cysts, STIs, abnl Pap smears    PMH: cHTN, hemorrhoids   PSH: C/S (2019), D+C (2019)    Meds: Labetalol 200mg TID, Procardia XL 60mg  All: NKDA    Soc: denies T/E/D  Psych: denies    Will accept blood products.    Vital Signs Last 24 Hrs  T(C): 36.9 (2021 15:59), Max: 36.9 (2021 15:59)  T(F): 98.4 (2021 15:59), Max: 98.4 (2021 15:59)  HR: 76 (2021 23:44) (69 - 84)  BP: 146/73 (2021 23:28) (120/65 - 172/94)  RR: 16 (2021 15:59) (16 - 16)    /mod/-accel/-decel  [place] acontractile    Gen: awake, alert, NAD  CV: Normal S1/S2, RRR  Pulm: CTAB  Abd: soft, nontender, gravid  Ext: nontender  Neuro: +2 DTR                 10.9   7.93  )-----------( 228      (  @ 20:50 )             32.7      20:50    137  |  104  |  10  ----------------------------<  113  3.8   |  20  |  0.71    Ca    9.0       @ 20:50    TPro  6.5  /  Alb  3.6  /  TBili  <0.2  /  DBili  x   /  AST  41  /  ALT  73  /  AlkPhos  110   @ 20:50    PT/INR - (  @ 20:50 )   PT: 11.5 sec;   INR: 1.00 ratio    PTT - (  @ 20:50 )  PTT:27.0 sec    Uric Acid: ( 20:50)  4.5      Fibrinogen: ( 20:50)  661      LDH: ( 20:50)  175 R3 OB Chart Note    39yo  @ 28w2d (by LMP (21) SAKINA 10/13/21) presenting from office for prolonged monitoring, serial BPs, HELLP labs, with possible BMZ if needed in setting of newly diagnosed IUGR (2%, AC <3%) with AEDV, SD 8.78.     Pt evaluated at bedside. No acute complaints. Denies HA, vision changes, RUQ or epigastric, body swelling, CP, SOB, fevers/chills. Reports recent evaluations in D&T for c/o nausea/vomiting, improved at this time.    PNC: [facility]. No reported genetic/sono fetal abnl. Pregnancy c/b AMA, cHTN (dx , Labetalol 200mg TID, Procardia XL 60mg), h/o previous 29w delivery via C/S 2/2 siPEC w/severe features.  - MFM (): VTX, posterior placenta, LUCERO 16.6, EFW 941g (2%, AC <3%), S/D 8.78, +AEDV    OBHx:  G1  MAB s/p D&C  G2  primary C/S c/b siPEC w/severe features 2.5#    GYNHx: denies fibroids, ov cysts, STIs, abnl Pap smears    PMH: cHTN, hemorrhoids   PSH: C/S (2020), D+C ()    Meds: Labetalol 200mg TID, Procardia XL 60mg  All: NKDA    Soc: denies T/E/D  Psych: denies    Will accept blood products.    Vital Signs Last 24 Hrs  T(C): 36.9 (2021 15:59), Max: 36.9 (2021 15:59)  T(F): 98.4 (2021 15:59), Max: 98.4 (2021 15:59)  HR: 76 (2021 23:44) (69 - 84)  BP: 146/73 (2021 23:28) (120/65 - 172/94)  RR: 16 (2021 15:59) (16 - 16)    3x sBP >160 within 1hr, pt meeting criteria for siPEC w/severe features vs cHTN exacerbation  Procardia IR 10mg administered.    /mod/-accel/-decel  [place] acontractile    Gen: awake, alert, NAD  CV: Normal S1/S2, RRR  Pulm: CTAB  Abd: soft, nontender, gravid  Ext: nontender  Neuro: +2 DTR                 10.9   7.93  )-----------( 228      (  @ 20:50 )             32.7      @ 20:50    137  |  104  |  10  ----------------------------<  113  3.8   |  20  |  0.71    Ca    9.0       @ 20:50    TPro  6.5  /  Alb  3.6  /  TBili  <0.2  /  DBili  x   /  AST  41  /  ALT  73  /  AlkPhos  110   @ 20:50    PT/INR - (  @ 20:50 )   PT: 11.5 sec;   INR: 1.00 ratio    PTT - (  @ 20:50 )  PTT:27.0 sec    Uric Acid: ( @ 20:50)  4.5      Fibrinogen: ( @ 20:50)  661      LDH: ( @ 20:50)  175      39yo  @ 28w2d (by LMP (21) SAKINA 10/13/21) admitted for siPEC w/severe features vs cHTN exacerbation. In setting of newly diagnosed IUGR with AEDV and h/o siPEC w/severe features, present diagnosis most likely siPEC w/severe features. Workup to be completed with BP monitoring, q6h labs, and 24h urine collection. Fetal status reassurring.     #siPEC w/severe features vs cHTN exacerbation  - MgSO4 for seizure ppx  - Labetalol 200 mg TID, uptitrate Procardia XL to 90mg for AM  - s/p Procardia 10 IR ()  - 24hr urine protein to be completed  - q6h HELLP labs w/Mg level  - monitor BPs, neuro function, strict I&O    #fetal well being  - continuous monitoring  - MFM (): VTX, posterior placenta, LUCERO 16.6, EFW 941g (2%, AC <3%), S/D 8.78, +AEDV    #maternal well being  - NPO, LR@50 w/Mg@50  - Request records from [place] for previous delivery  - COVID swab collected  - Admission labs ordered    d/w Dr. SUSANNE Mckay MFM Fellow and Dr. Ollie Gonzalez R3

## 2021-07-23 NOTE — OB PROVIDER TRIAGE NOTE - HISTORY OF PRESENT ILLNESS
Pt. is a 39y/o  EGA 28.2wks was sent in from OB's office for prolong monitoring, serial blood pressures, and HELLP labs. Pt. was diagnosed today with IUGR and absent end diastolic flow. Pt. denies any headache, visual changes, epigastric/RUQ pain, and N/V, abdominal contractions, leakage of fluid, and vaginal bleeding. Pt. reports good fetal movement.     AP: IUGR/Absent end diastolic flow  Medical Hx: cHTN Dx  and hemorrhoids    Surgical Hx: Primary  and D&C  OBGYN Hx:   Primary  2020 @29wks c/w siPEC on Mg. 2.5kg   SABx1 @8wks 3/2019 with D&C 3/2019   Meds: Labetalol 200mg TID (10am, 3pm, 10pm)  Procardia 60mg daily (10am)  ASA  PNV    NKDA

## 2021-07-23 NOTE — OB PROVIDER TRIAGE NOTE - NSHPLABSRESULTS_GEN_ALL_CORE
10.9   7.93  )-----------( 228      ( 2021 20:50 )             32.7     PT/INR - ( 2021 20:50 )   PT: 11.5 sec;   INR: 1.00 ratio    PTT - ( 2021 20:50 )  PTT:27.0 sec  Fibrinogen:         137  |  104  |  10  ----------------------------<  113<H>  3.8   |  20<L>  |  0.71    Ca    9.0      2021 20:50    TPro  6.5  /  Alb  3.6  /  TBili  <0.2  /  DBili  x   /  AST  41<H>  /  ALT  73<H>  /  AlkPhos  110  07-23    Uric acid:   LDH:     PC Ratio: 0.3    Urinalysis Basic - ( 2021 20:50 )    Color: Yellow / Appearance: Slightly Turbid / S.027 / pH: x  Gluc: x / Ketone: Trace  / Bili: Negative / Urobili: 3 mg/dL   Blood: x / Protein: 30 mg/dL / Nitrite: Negative   Leuk Esterase: Negative / RBC: 3 /HPF / WBC 9 /HPF   Sq Epi: x / Non Sq Epi: 20 /HPF / Bacteria: Occasional

## 2021-07-23 NOTE — OB PROVIDER TRIAGE NOTE - CURRENT PREGNANCY COMPLICATIONS, OB PROFILE
IUGR dx 7/23 in office-chronic HTN/Gestational Age less than 36 Weeks/Hypertensive Disorder/Intrauterine Growth Restriction/Maternal Unknown GBS/Other

## 2021-07-24 LAB
ALBUMIN SERPL ELPH-MCNC: 3.7 G/DL — SIGNIFICANT CHANGE UP (ref 3.3–5)
ALBUMIN SERPL ELPH-MCNC: 3.9 G/DL — SIGNIFICANT CHANGE UP (ref 3.3–5)
ALBUMIN SERPL ELPH-MCNC: 3.9 G/DL — SIGNIFICANT CHANGE UP (ref 3.3–5)
ALP SERPL-CCNC: 123 U/L — HIGH (ref 40–120)
ALP SERPL-CCNC: 124 U/L — HIGH (ref 40–120)
ALP SERPL-CCNC: 128 U/L — HIGH (ref 40–120)
ALT FLD-CCNC: 113 U/L — HIGH (ref 4–33)
ALT FLD-CCNC: 130 U/L — HIGH (ref 4–33)
ALT FLD-CCNC: 97 U/L — HIGH (ref 4–33)
ANION GAP SERPL CALC-SCNC: 15 MMOL/L — HIGH (ref 7–14)
ANION GAP SERPL CALC-SCNC: 15 MMOL/L — HIGH (ref 7–14)
ANION GAP SERPL CALC-SCNC: 16 MMOL/L — HIGH (ref 7–14)
APTT BLD: 27.1 SEC — SIGNIFICANT CHANGE UP (ref 27–36.3)
APTT BLD: 27.5 SEC — SIGNIFICANT CHANGE UP (ref 27–36.3)
APTT BLD: 32.4 SEC — SIGNIFICANT CHANGE UP (ref 27–36.3)
AST SERPL-CCNC: 56 U/L — HIGH (ref 4–32)
AST SERPL-CCNC: 67 U/L — HIGH (ref 4–32)
AST SERPL-CCNC: 75 U/L — HIGH (ref 4–32)
BASOPHILS # BLD AUTO: 0.01 K/UL — SIGNIFICANT CHANGE UP (ref 0–0.2)
BASOPHILS # BLD AUTO: 0.02 K/UL — SIGNIFICANT CHANGE UP (ref 0–0.2)
BASOPHILS # BLD AUTO: 0.02 K/UL — SIGNIFICANT CHANGE UP (ref 0–0.2)
BASOPHILS NFR BLD AUTO: 0.1 % — SIGNIFICANT CHANGE UP (ref 0–2)
BASOPHILS NFR BLD AUTO: 0.2 % — SIGNIFICANT CHANGE UP (ref 0–2)
BASOPHILS NFR BLD AUTO: 0.2 % — SIGNIFICANT CHANGE UP (ref 0–2)
BILIRUB SERPL-MCNC: 0.2 MG/DL — SIGNIFICANT CHANGE UP (ref 0.2–1.2)
BILIRUB SERPL-MCNC: 0.2 MG/DL — SIGNIFICANT CHANGE UP (ref 0.2–1.2)
BILIRUB SERPL-MCNC: <0.2 MG/DL — SIGNIFICANT CHANGE UP (ref 0.2–1.2)
BLD GP AB SCN SERPL QL: NEGATIVE — SIGNIFICANT CHANGE UP
BUN SERPL-MCNC: 7 MG/DL — SIGNIFICANT CHANGE UP (ref 7–23)
BUN SERPL-MCNC: 7 MG/DL — SIGNIFICANT CHANGE UP (ref 7–23)
BUN SERPL-MCNC: 8 MG/DL — SIGNIFICANT CHANGE UP (ref 7–23)
CALCIUM SERPL-MCNC: 7.8 MG/DL — LOW (ref 8.4–10.5)
CALCIUM SERPL-MCNC: 8.4 MG/DL — SIGNIFICANT CHANGE UP (ref 8.4–10.5)
CALCIUM SERPL-MCNC: 8.5 MG/DL — SIGNIFICANT CHANGE UP (ref 8.4–10.5)
CHLORIDE SERPL-SCNC: 100 MMOL/L — SIGNIFICANT CHANGE UP (ref 98–107)
CHLORIDE SERPL-SCNC: 104 MMOL/L — SIGNIFICANT CHANGE UP (ref 98–107)
CHLORIDE SERPL-SCNC: 99 MMOL/L — SIGNIFICANT CHANGE UP (ref 98–107)
CO2 SERPL-SCNC: 16 MMOL/L — LOW (ref 22–31)
CO2 SERPL-SCNC: 18 MMOL/L — LOW (ref 22–31)
CO2 SERPL-SCNC: 19 MMOL/L — LOW (ref 22–31)
COVID-19 SPIKE DOMAIN AB INTERP: NEGATIVE — SIGNIFICANT CHANGE UP
COVID-19 SPIKE DOMAIN ANTIBODY RESULT: 0.4 U/ML — SIGNIFICANT CHANGE UP
CREAT SERPL-MCNC: 0.66 MG/DL — SIGNIFICANT CHANGE UP (ref 0.5–1.3)
CREAT SERPL-MCNC: 0.67 MG/DL — SIGNIFICANT CHANGE UP (ref 0.5–1.3)
CREAT SERPL-MCNC: 0.67 MG/DL — SIGNIFICANT CHANGE UP (ref 0.5–1.3)
EOSINOPHIL # BLD AUTO: 0 K/UL — SIGNIFICANT CHANGE UP (ref 0–0.5)
EOSINOPHIL # BLD AUTO: 0 K/UL — SIGNIFICANT CHANGE UP (ref 0–0.5)
EOSINOPHIL # BLD AUTO: 0.01 K/UL — SIGNIFICANT CHANGE UP (ref 0–0.5)
EOSINOPHIL NFR BLD AUTO: 0 % — SIGNIFICANT CHANGE UP (ref 0–6)
EOSINOPHIL NFR BLD AUTO: 0 % — SIGNIFICANT CHANGE UP (ref 0–6)
EOSINOPHIL NFR BLD AUTO: 0.1 % — SIGNIFICANT CHANGE UP (ref 0–6)
FIBRINOGEN PPP-MCNC: 672 MG/DL — HIGH (ref 290–520)
FIBRINOGEN PPP-MCNC: 688 MG/DL — HIGH (ref 290–520)
FIBRINOGEN PPP-MCNC: 692 MG/DL — HIGH (ref 290–520)
GLUCOSE SERPL-MCNC: 104 MG/DL — HIGH (ref 70–99)
GLUCOSE SERPL-MCNC: 129 MG/DL — HIGH (ref 70–99)
GLUCOSE SERPL-MCNC: 150 MG/DL — HIGH (ref 70–99)
HCT VFR BLD CALC: 35.1 % — SIGNIFICANT CHANGE UP (ref 34.5–45)
HCT VFR BLD CALC: 35.5 % — SIGNIFICANT CHANGE UP (ref 34.5–45)
HCT VFR BLD CALC: 36 % — SIGNIFICANT CHANGE UP (ref 34.5–45)
HGB BLD-MCNC: 11.7 G/DL — SIGNIFICANT CHANGE UP (ref 11.5–15.5)
HGB BLD-MCNC: 11.9 G/DL — SIGNIFICANT CHANGE UP (ref 11.5–15.5)
HGB BLD-MCNC: 12.1 G/DL — SIGNIFICANT CHANGE UP (ref 11.5–15.5)
IANC: 7.47 K/UL — SIGNIFICANT CHANGE UP (ref 1.5–8.5)
IANC: 7.67 K/UL — SIGNIFICANT CHANGE UP (ref 1.5–8.5)
IANC: 8.77 K/UL — HIGH (ref 1.5–8.5)
IMM GRANULOCYTES NFR BLD AUTO: 0.4 % — SIGNIFICANT CHANGE UP (ref 0–1.5)
IMM GRANULOCYTES NFR BLD AUTO: 0.6 % — SIGNIFICANT CHANGE UP (ref 0–1.5)
IMM GRANULOCYTES NFR BLD AUTO: 0.6 % — SIGNIFICANT CHANGE UP (ref 0–1.5)
INR BLD: 0.93 RATIO — SIGNIFICANT CHANGE UP (ref 0.88–1.16)
INR BLD: 0.96 RATIO — SIGNIFICANT CHANGE UP (ref 0.88–1.16)
INR BLD: 0.99 RATIO — SIGNIFICANT CHANGE UP (ref 0.88–1.16)
LDH SERPL L TO P-CCNC: 201 U/L — SIGNIFICANT CHANGE UP (ref 135–225)
LDH SERPL L TO P-CCNC: 213 U/L — SIGNIFICANT CHANGE UP (ref 135–225)
LDH SERPL L TO P-CCNC: 215 U/L — SIGNIFICANT CHANGE UP (ref 135–225)
LYMPHOCYTES # BLD AUTO: 1.13 K/UL — SIGNIFICANT CHANGE UP (ref 1–3.3)
LYMPHOCYTES # BLD AUTO: 1.21 K/UL — SIGNIFICANT CHANGE UP (ref 1–3.3)
LYMPHOCYTES # BLD AUTO: 1.45 K/UL — SIGNIFICANT CHANGE UP (ref 1–3.3)
LYMPHOCYTES # BLD AUTO: 12.5 % — LOW (ref 13–44)
LYMPHOCYTES # BLD AUTO: 12.9 % — LOW (ref 13–44)
LYMPHOCYTES # BLD AUTO: 13.4 % — SIGNIFICANT CHANGE UP (ref 13–44)
MAGNESIUM SERPL-MCNC: 5.3 MG/DL — HIGH (ref 1.6–2.6)
MCHC RBC-ENTMCNC: 31 PG — SIGNIFICANT CHANGE UP (ref 27–34)
MCHC RBC-ENTMCNC: 31.4 PG — SIGNIFICANT CHANGE UP (ref 27–34)
MCHC RBC-ENTMCNC: 31.6 PG — SIGNIFICANT CHANGE UP (ref 27–34)
MCHC RBC-ENTMCNC: 33.3 GM/DL — SIGNIFICANT CHANGE UP (ref 32–36)
MCHC RBC-ENTMCNC: 33.5 GM/DL — SIGNIFICANT CHANGE UP (ref 32–36)
MCHC RBC-ENTMCNC: 33.6 GM/DL — SIGNIFICANT CHANGE UP (ref 32–36)
MCV RBC AUTO: 93.1 FL — SIGNIFICANT CHANGE UP (ref 80–100)
MCV RBC AUTO: 93.7 FL — SIGNIFICANT CHANGE UP (ref 80–100)
MCV RBC AUTO: 94 FL — SIGNIFICANT CHANGE UP (ref 80–100)
MONOCYTES # BLD AUTO: 0.14 K/UL — SIGNIFICANT CHANGE UP (ref 0–0.9)
MONOCYTES # BLD AUTO: 0.26 K/UL — SIGNIFICANT CHANGE UP (ref 0–0.9)
MONOCYTES # BLD AUTO: 0.94 K/UL — HIGH (ref 0–0.9)
MONOCYTES NFR BLD AUTO: 1.6 % — LOW (ref 2–14)
MONOCYTES NFR BLD AUTO: 2.9 % — SIGNIFICANT CHANGE UP (ref 2–14)
MONOCYTES NFR BLD AUTO: 8.4 % — SIGNIFICANT CHANGE UP (ref 2–14)
NEUTROPHILS # BLD AUTO: 7.47 K/UL — HIGH (ref 1.8–7.4)
NEUTROPHILS # BLD AUTO: 7.67 K/UL — HIGH (ref 1.8–7.4)
NEUTROPHILS # BLD AUTO: 8.77 K/UL — HIGH (ref 1.8–7.4)
NEUTROPHILS NFR BLD AUTO: 78.1 % — HIGH (ref 43–77)
NEUTROPHILS NFR BLD AUTO: 82.9 % — HIGH (ref 43–77)
NEUTROPHILS NFR BLD AUTO: 85.1 % — HIGH (ref 43–77)
NRBC # BLD: 0 /100 WBCS — SIGNIFICANT CHANGE UP
NRBC # FLD: 0 K/UL — SIGNIFICANT CHANGE UP
PLATELET # BLD AUTO: 241 K/UL — SIGNIFICANT CHANGE UP (ref 150–400)
PLATELET # BLD AUTO: 249 K/UL — SIGNIFICANT CHANGE UP (ref 150–400)
PLATELET # BLD AUTO: 251 K/UL — SIGNIFICANT CHANGE UP (ref 150–400)
POTASSIUM SERPL-MCNC: 3.9 MMOL/L — SIGNIFICANT CHANGE UP (ref 3.5–5.3)
POTASSIUM SERPL-MCNC: 3.9 MMOL/L — SIGNIFICANT CHANGE UP (ref 3.5–5.3)
POTASSIUM SERPL-MCNC: 4 MMOL/L — SIGNIFICANT CHANGE UP (ref 3.5–5.3)
POTASSIUM SERPL-SCNC: 3.9 MMOL/L — SIGNIFICANT CHANGE UP (ref 3.5–5.3)
POTASSIUM SERPL-SCNC: 3.9 MMOL/L — SIGNIFICANT CHANGE UP (ref 3.5–5.3)
POTASSIUM SERPL-SCNC: 4 MMOL/L — SIGNIFICANT CHANGE UP (ref 3.5–5.3)
PROT SERPL-MCNC: 6.8 G/DL — SIGNIFICANT CHANGE UP (ref 6–8.3)
PROT SERPL-MCNC: 7 G/DL — SIGNIFICANT CHANGE UP (ref 6–8.3)
PROT SERPL-MCNC: 7.1 G/DL — SIGNIFICANT CHANGE UP (ref 6–8.3)
PROTHROM AB SERPL-ACNC: 10.6 SEC — SIGNIFICANT CHANGE UP (ref 10.6–13.6)
PROTHROM AB SERPL-ACNC: 11.1 SEC — SIGNIFICANT CHANGE UP (ref 10.6–13.6)
PROTHROM AB SERPL-ACNC: 11.3 SEC — SIGNIFICANT CHANGE UP (ref 10.6–13.6)
RBC # BLD: 3.77 M/UL — LOW (ref 3.8–5.2)
RBC # BLD: 3.79 M/UL — LOW (ref 3.8–5.2)
RBC # BLD: 3.83 M/UL — SIGNIFICANT CHANGE UP (ref 3.8–5.2)
RBC # FLD: 12.9 % — SIGNIFICANT CHANGE UP (ref 10.3–14.5)
RBC # FLD: 12.9 % — SIGNIFICANT CHANGE UP (ref 10.3–14.5)
RBC # FLD: 13 % — SIGNIFICANT CHANGE UP (ref 10.3–14.5)
RH IG SCN BLD-IMP: POSITIVE — SIGNIFICANT CHANGE UP
RH IG SCN BLD-IMP: POSITIVE — SIGNIFICANT CHANGE UP
SARS-COV-2 IGG+IGM SERPL QL IA: 0.4 U/ML — SIGNIFICANT CHANGE UP
SARS-COV-2 IGG+IGM SERPL QL IA: NEGATIVE — SIGNIFICANT CHANGE UP
SARS-COV-2 RNA SPEC QL NAA+PROBE: SIGNIFICANT CHANGE UP
SODIUM SERPL-SCNC: 133 MMOL/L — LOW (ref 135–145)
SODIUM SERPL-SCNC: 134 MMOL/L — LOW (ref 135–145)
SODIUM SERPL-SCNC: 135 MMOL/L — SIGNIFICANT CHANGE UP (ref 135–145)
T PALLIDUM AB TITR SER: NEGATIVE — SIGNIFICANT CHANGE UP
URATE SERPL-MCNC: 5.1 MG/DL — SIGNIFICANT CHANGE UP (ref 2.5–7)
URATE SERPL-MCNC: 5.1 MG/DL — SIGNIFICANT CHANGE UP (ref 2.5–7)
URATE SERPL-MCNC: 5.2 MG/DL — SIGNIFICANT CHANGE UP (ref 2.5–7)
WBC # BLD: 11.23 K/UL — HIGH (ref 3.8–10.5)
WBC # BLD: 9.01 K/UL — SIGNIFICANT CHANGE UP (ref 3.8–10.5)
WBC # BLD: 9.01 K/UL — SIGNIFICANT CHANGE UP (ref 3.8–10.5)
WBC # FLD AUTO: 11.23 K/UL — HIGH (ref 3.8–10.5)
WBC # FLD AUTO: 9.01 K/UL — SIGNIFICANT CHANGE UP (ref 3.8–10.5)
WBC # FLD AUTO: 9.01 K/UL — SIGNIFICANT CHANGE UP (ref 3.8–10.5)

## 2021-07-24 RX ORDER — SODIUM CHLORIDE 9 MG/ML
1000 INJECTION, SOLUTION INTRAVENOUS
Refills: 0 | Status: DISCONTINUED | OUTPATIENT
Start: 2021-07-24 | End: 2021-07-25

## 2021-07-24 RX ORDER — MAGNESIUM SULFATE 500 MG/ML
2 VIAL (ML) INJECTION
Qty: 40 | Refills: 0 | Status: DISCONTINUED | OUTPATIENT
Start: 2021-07-24 | End: 2021-07-24

## 2021-07-24 RX ORDER — LABETALOL HCL 100 MG
200 TABLET ORAL EVERY 8 HOURS
Refills: 0 | Status: DISCONTINUED | OUTPATIENT
Start: 2021-07-24 | End: 2021-08-01

## 2021-07-24 RX ORDER — HEPARIN SODIUM 5000 [USP'U]/ML
5000 INJECTION INTRAVENOUS; SUBCUTANEOUS EVERY 12 HOURS
Refills: 0 | Status: DISCONTINUED | OUTPATIENT
Start: 2021-07-24 | End: 2021-07-29

## 2021-07-24 RX ORDER — NIFEDIPINE 30 MG
90 TABLET, EXTENDED RELEASE 24 HR ORAL EVERY 24 HOURS
Refills: 0 | Status: DISCONTINUED | OUTPATIENT
Start: 2021-07-24 | End: 2021-08-07

## 2021-07-24 RX ADMIN — Medication 50 GM/HR: at 07:14

## 2021-07-24 RX ADMIN — Medication 50 GM/HR: at 01:24

## 2021-07-24 RX ADMIN — SODIUM CHLORIDE 50 MILLILITER(S): 9 INJECTION, SOLUTION INTRAVENOUS at 06:26

## 2021-07-24 RX ADMIN — Medication 200 MILLIGRAM(S): at 10:10

## 2021-07-24 RX ADMIN — Medication 200 GRAM(S): at 00:37

## 2021-07-24 RX ADMIN — Medication 90 MILLIGRAM(S): at 13:46

## 2021-07-24 RX ADMIN — Medication 12 MILLIGRAM(S): at 00:48

## 2021-07-24 RX ADMIN — Medication 10 MILLIGRAM(S): at 00:05

## 2021-07-24 RX ADMIN — HEPARIN SODIUM 5000 UNIT(S): 5000 INJECTION INTRAVENOUS; SUBCUTANEOUS at 18:20

## 2021-07-24 RX ADMIN — Medication 200 MILLIGRAM(S): at 18:15

## 2021-07-24 NOTE — OB RN PATIENT PROFILE - NS_OBGYNHISTORY_OBGYN_ALL_OB_FT
OBHx:  G1 2019 MAB s/p D&C  G2 2020 primary C/S c/b siPEC w/severe features, 2.5#  G3 current    GYNHx: denies fibroids, ov cysts, STIs, abnl Pap smears

## 2021-07-24 NOTE — OB PROVIDER H&P - ASSESSMENT
39yo  @ 28w3d (by LMP (21) SAKINA 10/13/21) admitted for siPEC w/severe features vs cHTN exacerbation. In setting of newly diagnosed IUGR with AEDV and h/o siPEC w/severe features, present diagnosis most likely siPEC w/severe features. Workup to be completed with BP monitoring, q6h labs, and 24h urine collection. Fetal status reassurring.     #siPEC w/severe features vs cHTN exacerbation  - MgSO4 for seizure ppx  - Labetalol 200 mg TID, uptitrate Procardia XL to 90mg for AM  - s/p Procardia 10 IR ()  - 24hr urine protein to be completed  - q6h HELLP labs w/Mg level  - monitor BPs, neuro function, strict I&O    #fetal well being  - continuous monitoring  - [facility] (): VTX, posterior placenta, LUCERO 16.6, EFW 941g (2%, AC <3%), S/D 8.78, +AEDV    #maternal well being  - NPO, LR@50 w/Mg@50  - Request records from [place] for previous delivery  - COVID swab collected  - Admission labs ordered  - GBS swab to be collected    d/w Dr. SUSANNE Mckay M Fellow and Dr. Ollie Gonzalez R3.

## 2021-07-24 NOTE — PROGRESS NOTE ADULT - SUBJECTIVE AND OBJECTIVE BOX
Patient is a 40y old  Female who presents with a chief complaint of  elevated blood pressures and IUGR    HPI:  39yo  @ 28w2d (by LMP (21) SAKINA 10/13/21) presenting from office for prolonged monitoring, serial BPs, HELLP labs, with possible BMZ if needed in setting of newly diagnosed IUGR (2%, AC <3%) with AEDV, SD 8.78.     Pt evaluated at bedside. No acute complaints. Denies HA, vision changes, RUQ or epigastric, body swelling, CP, SOB, fevers/chills. Reports recent evaluations in D&T for c/o nausea/vomiting, improved at this time.    PNC: [facility]. No reported genetic/sono fetal abnl. Pregnancy c/b AMA, cHTN (dx , Labetalol 200mg TID, Procardia XL 60mg), h/o previous 29w delivery via C/S 2/2 siPEC w/severe features.    OBHx:  G1 2019 MAB s/p D&C  G2  primary C/S c/b siPEC w/severe features, 2.5#  G3 current    GYNHx: denies fibroids, ov cysts, STIs, abnl Pap smears    PMH: cHTN, hemorrhoids   PSH: C/S (), D+C ()    Meds: ASA81, Labetalol 200mg TID (10am, 3pm, 10pm); Procardia 60mg daily (10am)  All: NKDA    Soc: denies T/E/D  Psych: denies    Will accept blood products.   (2021 00:53)      PAST MEDICAL & SURGICAL HISTORY:  Hypertension  dx 2015    Previous  section      H/O dilation and curettage        MEDICATIONS  (STANDING):  betamethasone Injectable 12 milliGRAM(s) IntraMuscular every 24 hours  labetalol 200 milliGRAM(s) Oral every 8 hours  lactated ringers. 1000 milliLiter(s) (50 mL/Hr) IV Continuous <Continuous>  magnesium sulfate Infusion 2 Gm/Hr (50 mL/Hr) IV Continuous <Continuous>  NIFEdipine XL 90 milliGRAM(s) Oral every 24 hours              Vital Signs Last 24 Hrs  T(C): 36.7 (2021 04:30), Max: 36.9 (2021 15:59)  T(F): 98.06 (2021 04:30), Max: 98.4 (2021 15:59)  HR: 75 (2021 09:53) (69 - 102)  BP: 129/78 (2021 09:53) (109/61 - 172/94)  BP(mean): --  RR: 16 (2021 01:45) (16 - 16)  SpO2: 100% (2021 09:47) (97% - 100%)    PHYSICAL EXAM:  Patient had no complains at this time . Denies headache, blurred vison or epigastric pain.  FHR tracing shows moderate variability no decelerations  On mag sulphate at his time and Bps have all been  in non severe range.  She is collecting urine for 24 hours  Discussed with patient about severe preeclampsia/  She had previous pregnancy at [place] that also resulted in delivery at 28 weeks by  section.  Recommend :   Continue current management  stop magnesium after 12 hours  Continue 24 hour urine collection  Daily monitoring twice a day  Work up for antiphospholipid antibody syndrome  Heparin thromboprophylaxis  Will continue o follow  Please try to obtain records from [facility]      I&O's Summary    2021 07:01  -  2021 07:00  --------------------------------------------------------  IN: 700 mL / OUT: 1350 mL / NET: -650 mL        LABORATORY:                        12.1   9.01  )-----------( 249      ( 2021 06:45 )             36.0     0724    134<L>  |  100  |  7   ----------------------------<  104<H>  3.9   |  19<L>  |  0.67    Ca    8.5      2021 06:45  Mg     5.30     07-24    TPro  7.0  /  Alb  3.9  /  TBili  0.2  /  DBili  x   /  AST  56<H>  /  ALT  97<H>  /  AlkPhos  128<H>  07-24    PT/INR - ( 2021 06:45 )   PT: 10.6 sec;   INR: 0.93 ratio         PTT - ( 2021 06:45 )  PTT:27.5 sec  Urinalysis Basic - ( 2021 20:50 )    Color: Yellow / Appearance: Slightly Turbid / S.027 / pH: x  Gluc: x / Ketone: Trace  / Bili: Negative / Urobili: 3 mg/dL   Blood: x / Protein: 30 mg/dL / Nitrite: Negative   Leuk Esterase: Negative / RBC: 3 /HPF / WBC 9 /HPF   Sq Epi: x / Non Sq Epi: 20 /HPF / Bacteria: Occasional

## 2021-07-24 NOTE — OB PROVIDER H&P - HISTORY OF PRESENT ILLNESS
41yo  @ 28w2d (by LMP (21) SAKINA 10/13/21) presenting from office for prolonged monitoring, serial BPs, HELLP labs, with possible BMZ if needed in setting of newly diagnosed IUGR (2%, AC <3%) with AEDV, SD 8.78.     Pt evaluated at bedside. No acute complaints. Denies HA, vision changes, RUQ or epigastric, body swelling, CP, SOB, fevers/chills. Reports recent evaluations in D&T for c/o nausea/vomiting, improved at this time.    PNC: [facility]. No reported genetic/sono fetal abnl. Pregnancy c/b AMA, cHTN (dx , Labetalol 200mg TID, Procardia XL 60mg), h/o previous 29w delivery via C/S 2/ siPEC w/severe features.    OBHx:  G1 2019 MAB s/p D&C  G2  primary C/S c/b siPEC w/severe features, 2.5#  G3 current    GYNHx: denies fibroids, ov cysts, STIs, abnl Pap smears    PMH: cHTN, hemorrhoids   PSH: C/S (2019), D+C (2019)    Meds: Labetalol 200mg TID (10am, 3pm, 10pm); Procardia 60mg daily (10am)  All: NKDA    Soc: denies T/E/D  Psych: denies    Will accept blood products.    Primary  2020 @29wks c/w siPEC on Mg. 2.5kg   SABx1 @8wks 3/2019 with D&C 3/201   39yo  @ 28w2d (by LMP (21) SAKINA 10/13/21) presenting from office for prolonged monitoring, serial BPs, HELLP labs, with possible BMZ if needed in setting of newly diagnosed IUGR (2%, AC <3%) with AEDV, SD 8.78.     Pt evaluated at bedside. No acute complaints. Denies HA, vision changes, RUQ or epigastric, body swelling, CP, SOB, fevers/chills. Reports recent evaluations in D&T for c/o nausea/vomiting, improved at this time.    PNC: [facility]. No reported genetic/sono fetal abnl. Pregnancy c/b AMA, cHTN (dx , Labetalol 200mg TID, Procardia XL 60mg), h/o previous 29w delivery via C/S 2/2 siPEC w/severe features.    OBHx:  G1 2019 MAB s/p D&C  G2  primary C/S c/b siPEC w/severe features, 2.5#  G3 current    GYNHx: denies fibroids, ov cysts, STIs, abnl Pap smears    PMH: cHTN, hemorrhoids   PSH: C/S (), D+C (2019)    Meds: [id number], Labetalol 200mg TID (10am, 3pm, 10pm); Procardia 60mg daily (10am)  All: NKDA    Soc: denies T/E/D  Psych: denies    Will accept blood products.

## 2021-07-24 NOTE — OB PROVIDER H&P - NSHPPHYSICALEXAM_GEN_ALL_CORE
Vital Signs Last 24 Hrs  T(C): 36.9 (23 Jul 2021 15:59), Max: 36.9 (23 Jul 2021 15:59)  T(F): 98.4 (23 Jul 2021 15:59), Max: 98.4 (23 Jul 2021 15:59)  HR: 83 (24 Jul 2021 01:23) (69 - 102)  BP: 110/61 (24 Jul 2021 00:43) (110/61 - 172/94)  RR: 16 (23 Jul 2021 15:59) (16 - 16)  SpO2: 98% (24 Jul 2021 00:49) (98% - 99%)    /mod/+accel/-decel  [place] acontractile     Gen: awake, alert, NAD  CV: Normal S1/S2, RRR  Pulm: CTAB  Abd: soft, nontender, gravid  Ext: nontender  Neuro: +2 DTR

## 2021-07-24 NOTE — OB PROVIDER H&P - NS_OBGYNHISTORY_OBGYN_ALL_OB_FT
Primary  2020 @29wks c/w siPEC on Mg. 2.5kg   SABx1 @8wks 3/2019 with D&C 3/2019 OBHx:  G1 2019 MAB s/p D&C  G2 2020 primary C/S c/b siPEC w/severe features, 2.5#  G3 current    GYNHx: denies fibroids, ov cysts, STIs, abnl Pap smears

## 2021-07-25 LAB
ALBUMIN SERPL ELPH-MCNC: 3.5 G/DL — SIGNIFICANT CHANGE UP (ref 3.3–5)
ALBUMIN SERPL ELPH-MCNC: 3.8 G/DL — SIGNIFICANT CHANGE UP (ref 3.3–5)
ALP SERPL-CCNC: 118 U/L — SIGNIFICANT CHANGE UP (ref 40–120)
ALP SERPL-CCNC: 128 U/L — HIGH (ref 40–120)
ALT FLD-CCNC: 116 U/L — HIGH (ref 4–33)
ALT FLD-CCNC: 127 U/L — HIGH (ref 4–33)
ANION GAP SERPL CALC-SCNC: 14 MMOL/L — SIGNIFICANT CHANGE UP (ref 7–14)
ANION GAP SERPL CALC-SCNC: 17 MMOL/L — HIGH (ref 7–14)
APTT BLD: 29.6 SEC — SIGNIFICANT CHANGE UP (ref 27–36.3)
APTT BLD: 31.5 SEC — SIGNIFICANT CHANGE UP (ref 27–36.3)
AST SERPL-CCNC: 57 U/L — HIGH (ref 4–32)
AST SERPL-CCNC: 69 U/L — HIGH (ref 4–32)
BASOPHILS # BLD AUTO: 0.01 K/UL — SIGNIFICANT CHANGE UP (ref 0–0.2)
BASOPHILS # BLD AUTO: 0.03 K/UL — SIGNIFICANT CHANGE UP (ref 0–0.2)
BASOPHILS NFR BLD AUTO: 0.1 % — SIGNIFICANT CHANGE UP (ref 0–2)
BASOPHILS NFR BLD AUTO: 0.3 % — SIGNIFICANT CHANGE UP (ref 0–2)
BILIRUB SERPL-MCNC: 0.2 MG/DL — SIGNIFICANT CHANGE UP (ref 0.2–1.2)
BILIRUB SERPL-MCNC: <0.2 MG/DL — SIGNIFICANT CHANGE UP (ref 0.2–1.2)
BUN SERPL-MCNC: 12 MG/DL — SIGNIFICANT CHANGE UP (ref 7–23)
BUN SERPL-MCNC: 9 MG/DL — SIGNIFICANT CHANGE UP (ref 7–23)
CALCIUM SERPL-MCNC: 8.8 MG/DL — SIGNIFICANT CHANGE UP (ref 8.4–10.5)
CALCIUM SERPL-MCNC: 8.9 MG/DL — SIGNIFICANT CHANGE UP (ref 8.4–10.5)
CHLORIDE SERPL-SCNC: 101 MMOL/L — SIGNIFICANT CHANGE UP (ref 98–107)
CHLORIDE SERPL-SCNC: 103 MMOL/L — SIGNIFICANT CHANGE UP (ref 98–107)
CO2 SERPL-SCNC: 17 MMOL/L — LOW (ref 22–31)
CO2 SERPL-SCNC: 17 MMOL/L — LOW (ref 22–31)
COLLECT DURATION TIME UR: 24 HR — SIGNIFICANT CHANGE UP
CREAT SERPL-MCNC: 0.65 MG/DL — SIGNIFICANT CHANGE UP (ref 0.5–1.3)
CREAT SERPL-MCNC: 0.69 MG/DL — SIGNIFICANT CHANGE UP (ref 0.5–1.3)
EOSINOPHIL # BLD AUTO: 0 K/UL — SIGNIFICANT CHANGE UP (ref 0–0.5)
EOSINOPHIL # BLD AUTO: 0.01 K/UL — SIGNIFICANT CHANGE UP (ref 0–0.5)
EOSINOPHIL NFR BLD AUTO: 0 % — SIGNIFICANT CHANGE UP (ref 0–6)
EOSINOPHIL NFR BLD AUTO: 0.1 % — SIGNIFICANT CHANGE UP (ref 0–6)
FIBRINOGEN PPP-MCNC: 582 MG/DL — HIGH (ref 290–520)
FIBRINOGEN PPP-MCNC: 589 MG/DL — HIGH (ref 290–520)
GLUCOSE SERPL-MCNC: 122 MG/DL — HIGH (ref 70–99)
GLUCOSE SERPL-MCNC: 122 MG/DL — HIGH (ref 70–99)
GROUP B BETA STREP DNA (PCR): DETECTED
GROUP B BETA STREP INTERPRETATION: SIGNIFICANT CHANGE UP
HCT VFR BLD CALC: 32.8 % — LOW (ref 34.5–45)
HCT VFR BLD CALC: 33.9 % — LOW (ref 34.5–45)
HGB BLD-MCNC: 10.8 G/DL — LOW (ref 11.5–15.5)
HGB BLD-MCNC: 11.3 G/DL — LOW (ref 11.5–15.5)
IANC: 7.24 K/UL — SIGNIFICANT CHANGE UP (ref 1.5–8.5)
IANC: 7.52 K/UL — SIGNIFICANT CHANGE UP (ref 1.5–8.5)
IMM GRANULOCYTES NFR BLD AUTO: 0.4 % — SIGNIFICANT CHANGE UP (ref 0–1.5)
IMM GRANULOCYTES NFR BLD AUTO: 0.8 % — SIGNIFICANT CHANGE UP (ref 0–1.5)
INR BLD: 0.94 RATIO — SIGNIFICANT CHANGE UP (ref 0.88–1.16)
INR BLD: 0.96 RATIO — SIGNIFICANT CHANGE UP (ref 0.88–1.16)
LDH SERPL L TO P-CCNC: 188 U/L — SIGNIFICANT CHANGE UP (ref 135–225)
LDH SERPL L TO P-CCNC: 227 U/L — HIGH (ref 135–225)
LYMPHOCYTES # BLD AUTO: 1.08 K/UL — SIGNIFICANT CHANGE UP (ref 1–3.3)
LYMPHOCYTES # BLD AUTO: 1.64 K/UL — SIGNIFICANT CHANGE UP (ref 1–3.3)
LYMPHOCYTES # BLD AUTO: 12.1 % — LOW (ref 13–44)
LYMPHOCYTES # BLD AUTO: 17.2 % — SIGNIFICANT CHANGE UP (ref 13–44)
MCHC RBC-ENTMCNC: 31.3 PG — SIGNIFICANT CHANGE UP (ref 27–34)
MCHC RBC-ENTMCNC: 31.6 PG — SIGNIFICANT CHANGE UP (ref 27–34)
MCHC RBC-ENTMCNC: 32.9 GM/DL — SIGNIFICANT CHANGE UP (ref 32–36)
MCHC RBC-ENTMCNC: 33.3 GM/DL — SIGNIFICANT CHANGE UP (ref 32–36)
MCV RBC AUTO: 94.7 FL — SIGNIFICANT CHANGE UP (ref 80–100)
MCV RBC AUTO: 95.1 FL — SIGNIFICANT CHANGE UP (ref 80–100)
MONOCYTES # BLD AUTO: 0.24 K/UL — SIGNIFICANT CHANGE UP (ref 0–0.9)
MONOCYTES # BLD AUTO: 0.59 K/UL — SIGNIFICANT CHANGE UP (ref 0–0.9)
MONOCYTES NFR BLD AUTO: 2.7 % — SIGNIFICANT CHANGE UP (ref 2–14)
MONOCYTES NFR BLD AUTO: 6.2 % — SIGNIFICANT CHANGE UP (ref 2–14)
NEUTROPHILS # BLD AUTO: 7.24 K/UL — SIGNIFICANT CHANGE UP (ref 1.8–7.4)
NEUTROPHILS # BLD AUTO: 7.52 K/UL — HIGH (ref 1.8–7.4)
NEUTROPHILS NFR BLD AUTO: 75.8 % — SIGNIFICANT CHANGE UP (ref 43–77)
NEUTROPHILS NFR BLD AUTO: 84.3 % — HIGH (ref 43–77)
NRBC # BLD: 0 /100 WBCS — SIGNIFICANT CHANGE UP
NRBC # BLD: 0 /100 WBCS — SIGNIFICANT CHANGE UP
NRBC # FLD: 0 K/UL — SIGNIFICANT CHANGE UP
NRBC # FLD: 0 K/UL — SIGNIFICANT CHANGE UP
PLATELET # BLD AUTO: 229 K/UL — SIGNIFICANT CHANGE UP (ref 150–400)
PLATELET # BLD AUTO: 260 K/UL — SIGNIFICANT CHANGE UP (ref 150–400)
POTASSIUM SERPL-MCNC: 3.7 MMOL/L — SIGNIFICANT CHANGE UP (ref 3.5–5.3)
POTASSIUM SERPL-MCNC: 4.8 MMOL/L — SIGNIFICANT CHANGE UP (ref 3.5–5.3)
POTASSIUM SERPL-SCNC: 3.7 MMOL/L — SIGNIFICANT CHANGE UP (ref 3.5–5.3)
POTASSIUM SERPL-SCNC: 4.8 MMOL/L — SIGNIFICANT CHANGE UP (ref 3.5–5.3)
PROT 24H UR-MRATE: 179 MG/24 H — HIGH
PROT SERPL-MCNC: 6.6 G/DL — SIGNIFICANT CHANGE UP (ref 6–8.3)
PROT SERPL-MCNC: 6.9 G/DL — SIGNIFICANT CHANGE UP (ref 6–8.3)
PROTHROM AB SERPL-ACNC: 10.8 SEC — SIGNIFICANT CHANGE UP (ref 10.6–13.6)
PROTHROM AB SERPL-ACNC: 11.1 SEC — SIGNIFICANT CHANGE UP (ref 10.6–13.6)
RBC # BLD: 3.45 M/UL — LOW (ref 3.8–5.2)
RBC # BLD: 3.58 M/UL — LOW (ref 3.8–5.2)
RBC # FLD: 12.9 % — SIGNIFICANT CHANGE UP (ref 10.3–14.5)
RBC # FLD: 13.1 % — SIGNIFICANT CHANGE UP (ref 10.3–14.5)
SODIUM SERPL-SCNC: 134 MMOL/L — LOW (ref 135–145)
SODIUM SERPL-SCNC: 135 MMOL/L — SIGNIFICANT CHANGE UP (ref 135–145)
SOURCE GROUP B STREP: SIGNIFICANT CHANGE UP
TOTAL VOLUME - 24 HOUR: 2350 ML — SIGNIFICANT CHANGE UP
URATE SERPL-MCNC: 5 MG/DL — SIGNIFICANT CHANGE UP (ref 2.5–7)
URATE SERPL-MCNC: 5.6 MG/DL — SIGNIFICANT CHANGE UP (ref 2.5–7)
URINE CREATININE CALCULATION: 1.1 G/24 H — SIGNIFICANT CHANGE UP (ref 0.6–1.6)
WBC # BLD: 8.92 K/UL — SIGNIFICANT CHANGE UP (ref 3.8–10.5)
WBC # BLD: 9.55 K/UL — SIGNIFICANT CHANGE UP (ref 3.8–10.5)
WBC # FLD AUTO: 8.92 K/UL — SIGNIFICANT CHANGE UP (ref 3.8–10.5)
WBC # FLD AUTO: 9.55 K/UL — SIGNIFICANT CHANGE UP (ref 3.8–10.5)

## 2021-07-25 RX ORDER — ASPIRIN/CALCIUM CARB/MAGNESIUM 324 MG
81 TABLET ORAL DAILY
Refills: 0 | Status: DISCONTINUED | OUTPATIENT
Start: 2021-07-25 | End: 2021-07-29

## 2021-07-25 RX ADMIN — Medication 200 MILLIGRAM(S): at 10:31

## 2021-07-25 RX ADMIN — HEPARIN SODIUM 5000 UNIT(S): 5000 INJECTION INTRAVENOUS; SUBCUTANEOUS at 05:52

## 2021-07-25 RX ADMIN — Medication 90 MILLIGRAM(S): at 12:02

## 2021-07-25 RX ADMIN — Medication 200 MILLIGRAM(S): at 02:05

## 2021-07-25 RX ADMIN — Medication 81 MILLIGRAM(S): at 23:04

## 2021-07-25 RX ADMIN — HEPARIN SODIUM 5000 UNIT(S): 5000 INJECTION INTRAVENOUS; SUBCUTANEOUS at 17:09

## 2021-07-25 RX ADMIN — Medication 200 MILLIGRAM(S): at 17:01

## 2021-07-25 RX ADMIN — Medication 12 MILLIGRAM(S): at 02:08

## 2021-07-25 NOTE — PROGRESS NOTE ADULT - SUBJECTIVE AND OBJECTIVE BOX
Patient seen and examined at bedside, no acute overnight events. No acute complaints. Patient endorses good fetal movement. Patient is ambulating and tolerating regular diet. Denies CP, SOB, N/V, fevers, chills, or any other concerns.    Vital Signs Last 24 Hours  T(C): 37.0 (07-24-21 @ 20:54), Max: 37.0 (07-24-21 @ 20:54)  HR: 83 (07-25-21 @ 05:23) (74 - 97)  BP: 127/65 (07-25-21 @ 05:23) (103/56 - 145/83)  SpO2: 99% (07-24-21 @ 12:34) (97% - 100%)    I&O's Summary  23 Jul 2021 07:01  -  24 Jul 2021 07:00  --------------------------------------------------------  IN: 700 mL / OUT: 1350 mL / NET: -650 mL    24 Jul 2021 07:01  -  25 Jul 2021 05:30  --------------------------------------------------------  IN: 400 mL / OUT: 1600 mL / NET: -1200 mL    Physical Exam:  General: NAD  CV: RR  Lungs: breathing comfortably on RA  Abdomen: soft, gravid, non-tender  Ext: no pain or swelling    Labs:             11.3<L>  9.55  )-----------( 260      ( 07-25 @ 03:43 )             33.9<L>               11.7   11.23<H> )-----------( 251      ( 07-24 @ 21:28 )             35.1                11.9   9.01  )-----------( 241      ( 07-24 @ 13:10 )             35.5                12.1   9.01  )-----------( 249      ( 07-24 @ 06:45 )             36.0                10.9<L>  7.93  )-----------( 228      ( 07-23 @ 20:50 )             32.7<L>      MEDICATIONS  (STANDING):  heparin   Injectable 5000 Unit(s) SubCutaneous every 12 hours  labetalol 200 milliGRAM(s) Oral every 8 hours  lactated ringers. 1000 milliLiter(s) (50 mL/Hr) IV Continuous <Continuous>  NIFEdipine XL 90 milliGRAM(s) Oral every 24 hours

## 2021-07-25 NOTE — PROGRESS NOTE ADULT - ASSESSMENT
41yo  @ 28w4d (by LMP (21) SAKINA 10/13/21) admitted for siPEC w/severe features vs cHTN exacerbation. In setting of newly diagnosed IUGR with AEDV and h/o siPEC w/severe features, present diagnosis most likely siPEC w/severe features. Workup to be completed with BP monitoring, q6h labs, and 24h urine collection. Fetal status reassurring.     #siPEC w/severe features vs cHTN exacerbation  - Labetalol 200 mg TID, Procardia XL to 90mg  - s/p Procardia 10 IR ()  - 24hr urine protein to be completed  - q6h HELLP labs w/Mg level  - monitor BPs, neuro function, strict I&O    #fetal well being  - NST BID  - S/p BMZ (-)  - MFM (): VTX, posterior placenta, LUCERO 16.6, EFW 941g (2%, AC <3%), S/D 8.78, +AEDV    #maternal well being  - Reg, SLIV  - COVID neg  - GBS swab sent      Sahil, PGY-3

## 2021-07-26 ENCOUNTER — ASOB RESULT (OUTPATIENT)
Age: 41
End: 2021-07-26

## 2021-07-26 ENCOUNTER — NON-APPOINTMENT (OUTPATIENT)
Age: 41
End: 2021-07-26

## 2021-07-26 ENCOUNTER — APPOINTMENT (OUTPATIENT)
Dept: ANTEPARTUM | Facility: HOSPITAL | Age: 41
End: 2021-07-26

## 2021-07-26 LAB
ALBUMIN SERPL ELPH-MCNC: 3.4 G/DL — SIGNIFICANT CHANGE UP (ref 3.3–5)
ALP SERPL-CCNC: 109 U/L — SIGNIFICANT CHANGE UP (ref 40–120)
ALT FLD-CCNC: 99 U/L — HIGH (ref 4–33)
ANION GAP SERPL CALC-SCNC: 13 MMOL/L — SIGNIFICANT CHANGE UP (ref 7–14)
APTT BLD: 26.8 SEC — LOW (ref 27–36.3)
AST SERPL-CCNC: 43 U/L — HIGH (ref 4–32)
BASOPHILS # BLD AUTO: 0.02 K/UL — SIGNIFICANT CHANGE UP (ref 0–0.2)
BASOPHILS NFR BLD AUTO: 0.2 % — SIGNIFICANT CHANGE UP (ref 0–2)
BILIRUB SERPL-MCNC: <0.2 MG/DL — SIGNIFICANT CHANGE UP (ref 0.2–1.2)
BUN SERPL-MCNC: 10 MG/DL — SIGNIFICANT CHANGE UP (ref 7–23)
CALCIUM SERPL-MCNC: 9.1 MG/DL — SIGNIFICANT CHANGE UP (ref 8.4–10.5)
CHLORIDE SERPL-SCNC: 106 MMOL/L — SIGNIFICANT CHANGE UP (ref 98–107)
CO2 SERPL-SCNC: 18 MMOL/L — LOW (ref 22–31)
CREAT SERPL-MCNC: 0.62 MG/DL — SIGNIFICANT CHANGE UP (ref 0.5–1.3)
EOSINOPHIL # BLD AUTO: 0 K/UL — SIGNIFICANT CHANGE UP (ref 0–0.5)
EOSINOPHIL NFR BLD AUTO: 0 % — SIGNIFICANT CHANGE UP (ref 0–6)
FIBRINOGEN PPP-MCNC: 587 MG/DL — HIGH (ref 290–520)
GLUCOSE SERPL-MCNC: 99 MG/DL — SIGNIFICANT CHANGE UP (ref 70–99)
HAV IGM SER-ACNC: SIGNIFICANT CHANGE UP
HBV CORE IGM SER-ACNC: SIGNIFICANT CHANGE UP
HBV SURFACE AG SER-ACNC: SIGNIFICANT CHANGE UP
HCT VFR BLD CALC: 30.9 % — LOW (ref 34.5–45)
HCV AB S/CO SERPL IA: 0.09 S/CO — SIGNIFICANT CHANGE UP (ref 0–0.99)
HCV AB SERPL-IMP: SIGNIFICANT CHANGE UP
HGB BLD-MCNC: 10.4 G/DL — LOW (ref 11.5–15.5)
IANC: 10.09 K/UL — HIGH (ref 1.5–8.5)
IMM GRANULOCYTES NFR BLD AUTO: 0.9 % — SIGNIFICANT CHANGE UP (ref 0–1.5)
INR BLD: 0.94 RATIO — SIGNIFICANT CHANGE UP (ref 0.88–1.16)
LDH SERPL L TO P-CCNC: 191 U/L — SIGNIFICANT CHANGE UP (ref 135–225)
LYMPHOCYTES # BLD AUTO: 1.95 K/UL — SIGNIFICANT CHANGE UP (ref 1–3.3)
LYMPHOCYTES # BLD AUTO: 14.9 % — SIGNIFICANT CHANGE UP (ref 13–44)
MCHC RBC-ENTMCNC: 31.5 PG — SIGNIFICANT CHANGE UP (ref 27–34)
MCHC RBC-ENTMCNC: 33.7 GM/DL — SIGNIFICANT CHANGE UP (ref 32–36)
MCV RBC AUTO: 93.6 FL — SIGNIFICANT CHANGE UP (ref 80–100)
MONOCYTES # BLD AUTO: 0.95 K/UL — HIGH (ref 0–0.9)
MONOCYTES NFR BLD AUTO: 7.2 % — SIGNIFICANT CHANGE UP (ref 2–14)
NEUTROPHILS # BLD AUTO: 10.09 K/UL — HIGH (ref 1.8–7.4)
NEUTROPHILS NFR BLD AUTO: 76.8 % — SIGNIFICANT CHANGE UP (ref 43–77)
NRBC # BLD: 0 /100 WBCS — SIGNIFICANT CHANGE UP
NRBC # FLD: 0 K/UL — SIGNIFICANT CHANGE UP
PLATELET # BLD AUTO: 223 K/UL — SIGNIFICANT CHANGE UP (ref 150–400)
POTASSIUM SERPL-MCNC: 3.9 MMOL/L — SIGNIFICANT CHANGE UP (ref 3.5–5.3)
POTASSIUM SERPL-SCNC: 3.9 MMOL/L — SIGNIFICANT CHANGE UP (ref 3.5–5.3)
PROT SERPL-MCNC: 6.2 G/DL — SIGNIFICANT CHANGE UP (ref 6–8.3)
PROTHROM AB SERPL-ACNC: 10.8 SEC — SIGNIFICANT CHANGE UP (ref 10.6–13.6)
RBC # BLD: 3.3 M/UL — LOW (ref 3.8–5.2)
RBC # FLD: 13.1 % — SIGNIFICANT CHANGE UP (ref 10.3–14.5)
SODIUM SERPL-SCNC: 137 MMOL/L — SIGNIFICANT CHANGE UP (ref 135–145)
URATE SERPL-MCNC: 4.5 MG/DL — SIGNIFICANT CHANGE UP (ref 2.5–7)
WBC # BLD: 13.13 K/UL — HIGH (ref 3.8–10.5)
WBC # FLD AUTO: 13.13 K/UL — HIGH (ref 3.8–10.5)

## 2021-07-26 PROCEDURE — 76819 FETAL BIOPHYS PROFIL W/O NST: CPT | Mod: 26

## 2021-07-26 PROCEDURE — 99253 IP/OBS CNSLTJ NEW/EST LOW 45: CPT | Mod: GC

## 2021-07-26 PROCEDURE — 99232 SBSQ HOSP IP/OBS MODERATE 35: CPT

## 2021-07-26 RX ORDER — ASCORBIC ACID 60 MG
500 TABLET,CHEWABLE ORAL DAILY
Refills: 0 | Status: DISCONTINUED | OUTPATIENT
Start: 2021-07-26 | End: 2021-08-07

## 2021-07-26 RX ORDER — FERROUS SULFATE 325(65) MG
325 TABLET ORAL
Refills: 0 | Status: DISCONTINUED | OUTPATIENT
Start: 2021-07-26 | End: 2021-08-07

## 2021-07-26 RX ADMIN — Medication 200 MILLIGRAM(S): at 10:06

## 2021-07-26 RX ADMIN — Medication 90 MILLIGRAM(S): at 14:06

## 2021-07-26 RX ADMIN — HEPARIN SODIUM 5000 UNIT(S): 5000 INJECTION INTRAVENOUS; SUBCUTANEOUS at 05:51

## 2021-07-26 RX ADMIN — HEPARIN SODIUM 5000 UNIT(S): 5000 INJECTION INTRAVENOUS; SUBCUTANEOUS at 17:56

## 2021-07-26 RX ADMIN — Medication 200 MILLIGRAM(S): at 17:56

## 2021-07-26 RX ADMIN — Medication 81 MILLIGRAM(S): at 14:07

## 2021-07-26 RX ADMIN — Medication 200 MILLIGRAM(S): at 01:39

## 2021-07-26 NOTE — PROGRESS NOTE ADULT - ASSESSMENT
41yo  @ 28w5d (by LMP (21) SAKINA 10/13/21) admitted for siPEC w/severe features vs cHTN exacerbation. Lab abnormalities notable for isolated elevated LFTs, labwork overall wnl. -140/80s with one 150/80 yesterday.     #siPEC w/severe features vs cHTN exacerbation  - c/w Labetalol 200 mg TID, Procardia XL to 90mg  - s/p Procardia 10 IR ()  - f/u 24hr urine protein   - AM HELLP labs, LFTs currently downtrending  - monitor BPs, neuro function, strict I&O    #Isolated Elevated LFTs  - f/u Hepatits panel  - f/u RUQ sono    #fetal well being  - NST BID  - S/p BMZ (-)  - MFM (): VTX, posterior placenta, LUCERO 16.6, EFW 941g (2%, AC <3%), S/D 8.78, +AEDV  - MOD:     #maternal well being  - Reg, SLIV  - COVID neg  - GBS swab sent      Dispo: monitor BP and symptoms unless change in maternal or fetal status    ADomney PG-3

## 2021-07-26 NOTE — PROGRESS NOTE ADULT - SUBJECTIVE AND OBJECTIVE BOX
R3 Antepartum Note, HD#3    Patient seen and examined at bedside, no acute overnight events. No acute complaints. Reports increased b/l swelling of LE. Denies HA, SOB, CP, RUQ/epigastric pain, or vision changes.  Pt reports +FM, denies LOF, VB, ctx, N/V, fevers, and chills.    Vital Signs Last 24 Hours  T(C): 37 (07-26-21 @ 05:48), Max: 37.2 (07-25-21 @ 10:22)  HR: 83 (07-26-21 @ 07:13) (77 - 105)  BP: 127/67 (07-26-21 @ 05:48) (126/68 - 156/82)  RR: 16 (07-26-21 @ 05:48) (16 - 18)  SpO2: 98% (07-26-21 @ 07:13) (97% - 100%)    Physical Exam:  General: NAD  CV: RRR, no m/r/g  Lungs: CTAB  Abdomen: Soft, non-tender, gravid  Ext: No pain or erythema. Mild swelling, ~+1 pitting edema noted bilaterally. Josiah's sign negative.     NST reactive overnight    Labs:             10.4   13.13 )-----------( 223      ( 07-26 @ 06:51 )             30.9     07-25 @ 08:53    134  |  103  |  12  ----------------------------<  122  4.8   |  17  |  0.65    Ca    8.9      07-25 @ 08:53    TPro  6.6  /  Alb  3.5  /  TBili  <0.2  /  DBili  x   /  AST  57  /  ALT  116  /  AlkPhos  118  07-25 @ 08:53    PT/INR - ( 07-26 @ 06:51 )   PT: 10.8 sec;   INR: 0.94 ratio    PTT - ( 07-26 @ 06:51 )  PTT:26.8 sec    Uric Acid: (07-26 @ 06:51)  --       Fibrinogen: (07-26 @ 06:51)  587      LDH: (07-26 @ 06:51)  --         MEDICATIONS  (STANDING):  aspirin enteric coated 81 milliGRAM(s) Oral daily  heparin   Injectable 5000 Unit(s) SubCutaneous every 12 hours  labetalol 200 milliGRAM(s) Oral every 8 hours  NIFEdipine XL 90 milliGRAM(s) Oral every 24 hours    MEDICATIONS  (PRN):   R3 Antepartum Note, HD#3    Patient seen and examined at bedside, no acute overnight events. No acute complaints. Reports increased b/l swelling of LE. Denies HA, SOB, CP, RUQ/epigastric pain, or vision changes.  Pt reports +FM, denies LOF, VB, ctx, N/V, fevers, and chills.    Vital Signs Last 24 Hours  T(C): 37 (07-26-21 @ 05:48), Max: 37.2 (07-25-21 @ 10:22)  HR: 83 (07-26-21 @ 07:13) (77 - 105)  BP: 127/67 (07-26-21 @ 05:48) (126/68 - 156/82)  RR: 16 (07-26-21 @ 05:48) (16 - 18)  SpO2: 98% (07-26-21 @ 07:13) (97% - 100%)    Physical Exam:  General: NAD  CV: RRR, no m/r/g  Lungs: CTAB  Abdomen: Soft, non-tender, gravid  Ext: No pain or erythema. Mild swelling, ~+1 pitting edema noted bilaterally. Josiah's sign negative.     NST reactive overnight    Labs:             10.4   13.13 )-----------( 223      ( 07-26 @ 06:51 )             30.9     07-25 @ 08:53    134  |  103  |  12  ----------------------------<  122  4.8   |  17  |  0.65    Ca    8.9      07-25 @ 08:53    TPro  6.6  /  Alb  3.5  /  TBili  <0.2  /  DBili  x   /  AST  57  /  ALT  116  /  AlkPhos  118  07-25 @ 08:53    PT/INR - ( 07-26 @ 06:51 )   PT: 10.8 sec;   INR: 0.94 ratio    PTT - ( 07-26 @ 06:51 )  PTT:26.8 sec    Fibrinogen: (07-26 @ 06:51)  587          MEDICATIONS  (STANDING):  aspirin enteric coated 81 milliGRAM(s) Oral daily  heparin   Injectable 5000 Unit(s) SubCutaneous every 12 hours  labetalol 200 milliGRAM(s) Oral every 8 hours  NIFEdipine XL 90 milliGRAM(s) Oral every 24 hours

## 2021-07-27 LAB
ALBUMIN SERPL ELPH-MCNC: 3.5 G/DL — SIGNIFICANT CHANGE UP (ref 3.3–5)
ALP SERPL-CCNC: 109 U/L — SIGNIFICANT CHANGE UP (ref 40–120)
ALT FLD-CCNC: 90 U/L — HIGH (ref 4–33)
ANION GAP SERPL CALC-SCNC: 14 MMOL/L — SIGNIFICANT CHANGE UP (ref 7–14)
APTT BLD: 28.8 SEC — SIGNIFICANT CHANGE UP (ref 27–36.3)
AST SERPL-CCNC: 38 U/L — HIGH (ref 4–32)
BASOPHILS # BLD AUTO: 0.04 K/UL — SIGNIFICANT CHANGE UP (ref 0–0.2)
BASOPHILS NFR BLD AUTO: 0.3 % — SIGNIFICANT CHANGE UP (ref 0–2)
BILIRUB SERPL-MCNC: <0.2 MG/DL — SIGNIFICANT CHANGE UP (ref 0.2–1.2)
BLD GP AB SCN SERPL QL: NEGATIVE — SIGNIFICANT CHANGE UP
BUN SERPL-MCNC: 12 MG/DL — SIGNIFICANT CHANGE UP (ref 7–23)
CALCIUM SERPL-MCNC: 9.2 MG/DL — SIGNIFICANT CHANGE UP (ref 8.4–10.5)
CHLORIDE SERPL-SCNC: 104 MMOL/L — SIGNIFICANT CHANGE UP (ref 98–107)
CO2 SERPL-SCNC: 19 MMOL/L — LOW (ref 22–31)
CREAT SERPL-MCNC: 0.61 MG/DL — SIGNIFICANT CHANGE UP (ref 0.5–1.3)
EOSINOPHIL # BLD AUTO: 0.04 K/UL — SIGNIFICANT CHANGE UP (ref 0–0.5)
EOSINOPHIL NFR BLD AUTO: 0.3 % — SIGNIFICANT CHANGE UP (ref 0–6)
FIBRINOGEN PPP-MCNC: 539 MG/DL — HIGH (ref 290–520)
GLUCOSE SERPL-MCNC: 97 MG/DL — SIGNIFICANT CHANGE UP (ref 70–99)
HCT VFR BLD CALC: 31.8 % — LOW (ref 34.5–45)
HGB BLD-MCNC: 10.5 G/DL — LOW (ref 11.5–15.5)
IANC: 7.92 K/UL — SIGNIFICANT CHANGE UP (ref 1.5–8.5)
IMM GRANULOCYTES NFR BLD AUTO: 1.8 % — HIGH (ref 0–1.5)
INR BLD: 0.96 RATIO — SIGNIFICANT CHANGE UP (ref 0.88–1.16)
LDH SERPL L TO P-CCNC: 187 U/L — SIGNIFICANT CHANGE UP (ref 135–225)
LYMPHOCYTES # BLD AUTO: 2.39 K/UL — SIGNIFICANT CHANGE UP (ref 1–3.3)
LYMPHOCYTES # BLD AUTO: 20.4 % — SIGNIFICANT CHANGE UP (ref 13–44)
MCHC RBC-ENTMCNC: 31.3 PG — SIGNIFICANT CHANGE UP (ref 27–34)
MCHC RBC-ENTMCNC: 33 GM/DL — SIGNIFICANT CHANGE UP (ref 32–36)
MCV RBC AUTO: 94.6 FL — SIGNIFICANT CHANGE UP (ref 80–100)
MONOCYTES # BLD AUTO: 1.12 K/UL — HIGH (ref 0–0.9)
MONOCYTES NFR BLD AUTO: 9.6 % — SIGNIFICANT CHANGE UP (ref 2–14)
NEUTROPHILS # BLD AUTO: 7.92 K/UL — HIGH (ref 1.8–7.4)
NEUTROPHILS NFR BLD AUTO: 67.6 % — SIGNIFICANT CHANGE UP (ref 43–77)
NRBC # BLD: 0 /100 WBCS — SIGNIFICANT CHANGE UP
NRBC # FLD: 0.03 K/UL — HIGH
PLATELET # BLD AUTO: 217 K/UL — SIGNIFICANT CHANGE UP (ref 150–400)
POTASSIUM SERPL-MCNC: 3.9 MMOL/L — SIGNIFICANT CHANGE UP (ref 3.5–5.3)
POTASSIUM SERPL-SCNC: 3.9 MMOL/L — SIGNIFICANT CHANGE UP (ref 3.5–5.3)
PROT SERPL-MCNC: 6.3 G/DL — SIGNIFICANT CHANGE UP (ref 6–8.3)
PROTHROM AB SERPL-ACNC: 11.1 SEC — SIGNIFICANT CHANGE UP (ref 10.6–13.6)
RBC # BLD: 3.36 M/UL — LOW (ref 3.8–5.2)
RBC # FLD: 13 % — SIGNIFICANT CHANGE UP (ref 10.3–14.5)
RH IG SCN BLD-IMP: POSITIVE — SIGNIFICANT CHANGE UP
SODIUM SERPL-SCNC: 137 MMOL/L — SIGNIFICANT CHANGE UP (ref 135–145)
URATE SERPL-MCNC: 4.1 MG/DL — SIGNIFICANT CHANGE UP (ref 2.5–7)
WBC # BLD: 11.72 K/UL — HIGH (ref 3.8–10.5)
WBC # FLD AUTO: 11.72 K/UL — HIGH (ref 3.8–10.5)

## 2021-07-27 RX ADMIN — Medication 500 MILLIGRAM(S): at 18:08

## 2021-07-27 RX ADMIN — Medication 325 MILLIGRAM(S): at 06:15

## 2021-07-27 RX ADMIN — Medication 200 MILLIGRAM(S): at 02:13

## 2021-07-27 RX ADMIN — Medication 81 MILLIGRAM(S): at 13:54

## 2021-07-27 RX ADMIN — Medication 325 MILLIGRAM(S): at 18:07

## 2021-07-27 RX ADMIN — Medication 200 MILLIGRAM(S): at 10:38

## 2021-07-27 RX ADMIN — HEPARIN SODIUM 5000 UNIT(S): 5000 INJECTION INTRAVENOUS; SUBCUTANEOUS at 18:08

## 2021-07-27 RX ADMIN — Medication 90 MILLIGRAM(S): at 13:54

## 2021-07-27 RX ADMIN — Medication 200 MILLIGRAM(S): at 18:07

## 2021-07-27 RX ADMIN — HEPARIN SODIUM 5000 UNIT(S): 5000 INJECTION INTRAVENOUS; SUBCUTANEOUS at 06:15

## 2021-07-27 NOTE — CONSULT NOTE PEDS - SUBJECTIVE AND OBJECTIVE BOX
Maternal History:  Ms. Goodwin is a 40y.o.  at 28w5d with cHTN and hemorrhoids and pregnancy c/b SPEC, IUGR (2%ile), and AEDV who was admitted for BP monitoring. BMZ given -.  sono: EFW 941g.    NICU Consult:  Consultation held with Ms. Goodwin. The following issues were discussed in detail with her:    1. Described the overall outcomes for babies born at 28 weeks.    2. Described the need for NICU team at the time of the delivery to stabilize the patient and transport the patient to the NICU.    3. Risk of respiratory problems is still present, because lungs can be immature. The outcomes improve after  steroids. At birth, patient most likely will need additional support to breathe, this can be in the form of nasal cannulas providing CPAP or intubation and mechanical ventilation.    4. Risk of infection considering  labor and prematurity discussed. Antibiotics may be started after birth for presumed sepsis and blood cultures would be followed.    5. Initially baby will require TPN. Feeding would be initiated once the baby is stable and will slowly progress to full feeds. Umbilical lines initially would be placed followed by PICC line for the same if he requires longer duration of TPN. Went over the need for an oral gastric tube as baby will not have the ability to feed and slow transition to PO feeds discussed.    6. Need for hypoglycemia monitoring. Initiation and advancement of feeding.    7. Thermoregulation issues discussed and need to be in an isolette with slow weaning to a crib discussed.    8. Risk of symptomatic PDA discussed.    9. Need for possible blood transfusions, jaundice, and phototherapy discussed.    10. Developmental outcomes discussed which could be good but still patient is at risk considering prematurity. Risk of IVH discussed with eventual consequences.    11. ROP risk discussed along with close follow-up with ophthalmologist.    12. Discharge criteria discussed with anticipated length of stay.    Ms. Goodwin expressed understanding of the issues mentioned. She was given the opportunity to ask questions and I answered them to the best of my knowledge.    Thank you for the consult. Please re-consult as needed.    Tam Sandoval,  NICU Fellow

## 2021-07-27 NOTE — PROGRESS NOTE ADULT - ASSESSMENT
39yo  @ 28w6d (by LMP (21) SAKINA 10/13/21) admitted for siPEC w/severe features vs cHTN exacerbation. Lab abnormalities notable for isolated elevated LFTs, labwork overall wnl. -140/60-80s. Fetal status overall reassuring at this time.    #siPEC w/severe features vs cHTN exacerbation  - c/w Labetalol 200 mg TID, Procardia XL to 90mg  - s/p Procardia 10 IR ()  - 24hr urine (): 179  - AM HELLP labs, LFTs currently downtrending  - monitor BPs, neuro function, strict I&O    #Isolated Elevated LFTs  - RUQ Sono (): contracted gallbladder, no obvious stones  - hepatitis panel (): neg  - continue trending CMP    #fetal well being  - NST BID  - S/p BMZ (-)  - MFM (): VTX, posterior placenta, LUCERO 16.6, EFW 941g (2%, AC <3%), S/D 8.78, +AEDV  - for MFM sono today  - GBS positive  - MOD:     #maternal well being  - Reg, SLIV  - COVID neg    Dispo: monitor BP and symptoms unless change in maternal or fetal status    ADomney PG-3

## 2021-07-27 NOTE — PROGRESS NOTE ADULT - SUBJECTIVE AND OBJECTIVE BOX
R3 Antepartum Note, HD#4    Patient seen and examined at bedside, no acute overnight events. No acute complaints. Denies HA, SOB, CP, RUQ/epigastric pain, b/l swelling LE and UE, or vision changes. Pt reports +FM, denies LOF, VB, ctx,N/V, fevers, and chills.    Vital Signs Last 24 Hours  T(C): 36.8 (07-27-21 @ 06:13), Max: 37.2 (07-26-21 @ 13:59)  HR: 81 (07-27-21 @ 06:13) (81 - 98)  BP: 128/67 (07-27-21 @ 06:13) (128/67 - 145/84)  RR: 18 (07-27-21 @ 06:13) (16 - 18)  SpO2: 98% (07-27-21 @ 06:13) (98% - 99%)    Physical Exam:  General: NAD  CV: RRR, no m/r/g  Lungs: CTAB  Abdomen: Soft, non-tender, gravid  Ext: No pain or swelling    NST reactive overnight    Labs:             10.5   11.72 )-----------( 217      ( 07-27 @ 06:39 )             31.8     07-27 @ 06:39    137  |  104  |  12  ----------------------------<  97  3.9   |  19  |  0.61    Ca    9.2      07-27 @ 06:39    TPro  6.3  /  Alb  3.5  /  TBili  <0.2  /  DBili  x   /  AST  38  /  ALT  90  /  AlkPhos  109  07-27 @ 06:39    PT/INR - ( 07-27 @ 06:39 )   PT: 11.1 sec;   INR: 0.96 ratio    PTT - ( 07-27 @ 06:39 )  PTT:28.8 sec    Uric Acid: (07-27 @ 06:39)  4.1      Fibrinogen: (07-27 @ 06:39)  539      LDH: (07-27 @ 06:39)  187        MEDICATIONS  (STANDING):  ascorbic acid 500 milliGRAM(s) Oral daily  aspirin enteric coated 81 milliGRAM(s) Oral daily  ferrous    sulfate 325 milliGRAM(s) Oral two times a day  heparin   Injectable 5000 Unit(s) SubCutaneous every 12 hours  labetalol 200 milliGRAM(s) Oral every 8 hours  NIFEdipine XL 90 milliGRAM(s) Oral every 24 hours    MEDICATIONS  (PRN):

## 2021-07-28 ENCOUNTER — ASOB RESULT (OUTPATIENT)
Age: 41
End: 2021-07-28

## 2021-07-28 ENCOUNTER — APPOINTMENT (OUTPATIENT)
Dept: ANTEPARTUM | Facility: HOSPITAL | Age: 41
End: 2021-07-28
Payer: COMMERCIAL

## 2021-07-28 PROCEDURE — 76821 MIDDLE CEREBRAL ARTERY ECHO: CPT | Mod: 26

## 2021-07-28 PROCEDURE — 99232 SBSQ HOSP IP/OBS MODERATE 35: CPT | Mod: GC

## 2021-07-28 PROCEDURE — 76819 FETAL BIOPHYS PROFIL W/O NST: CPT | Mod: 26

## 2021-07-28 PROCEDURE — 76820 UMBILICAL ARTERY ECHO: CPT | Mod: 26

## 2021-07-28 RX ADMIN — Medication 90 MILLIGRAM(S): at 13:50

## 2021-07-28 RX ADMIN — Medication 200 MILLIGRAM(S): at 01:40

## 2021-07-28 RX ADMIN — Medication 200 MILLIGRAM(S): at 09:12

## 2021-07-28 RX ADMIN — Medication 81 MILLIGRAM(S): at 09:12

## 2021-07-28 RX ADMIN — HEPARIN SODIUM 5000 UNIT(S): 5000 INJECTION INTRAVENOUS; SUBCUTANEOUS at 06:29

## 2021-07-28 RX ADMIN — Medication 200 MILLIGRAM(S): at 16:12

## 2021-07-28 RX ADMIN — Medication 325 MILLIGRAM(S): at 16:13

## 2021-07-28 RX ADMIN — Medication 200 MILLIGRAM(S): at 23:41

## 2021-07-28 RX ADMIN — Medication 500 MILLIGRAM(S): at 09:13

## 2021-07-28 RX ADMIN — Medication 325 MILLIGRAM(S): at 06:29

## 2021-07-28 NOTE — PROGRESS NOTE ADULT - ASSESSMENT
41yo  @ 29w (by LMP (21) SAKINA 10/13/21) admitted for siPEC w/severe features vs cHTN exacerbation. Lab abnormalities notable for isolated elevated LFTs, labwork overall wnl. -140/60-80s. Overnight with minimal variability, 12m spontanous deceleartion to the 120s from baseline of 150, patient to be placed on monitor early this AM for evaluation. Will perform BPP pending tracing.     #siPEC w/severe features vs cHTN exacerbation  - c/w Labetalol 200 mg TID, Procardia XL to 90mg  - s/p Procardia 10 IR ()  - 24hr urine (): 179  - HELLP labs q3d ()  - monitor BPs, neuro function, strict I&O    #Isolated Elevated LFTs  - RUQ Sono (): contracted gallbladder, no obvious stones  - hepatitis panel (): neg  - continue trending CMP q3d, downtrending at this time    #fetal well being  - change in characteristic tracing overnight, monitoring early as above noted  - NST BID  - S/p BMZ (-)  - MFM (): VTX, posterior placenta, LUCERO 16.6, EFW 941g (2%, AC <3%), S/D 8.78, +AEDV  - for MFM sono today  - GBS positive  - MOD:     #maternal well being  - Reg, SLIV  - COVID neg    Dispo: monitor BP and symptoms unless change in maternal or fetal status    ADomney PG-3

## 2021-07-28 NOTE — PROGRESS NOTE ADULT - SUBJECTIVE AND OBJECTIVE BOX
R3 Antepartum Note, HD#5    Interval: Overnight patient with 12 minute spontaneous decel with rufus to 125 from baseline of 150s. Patient remained on monitor following with moderate variability, ~1 [id number]. Minimal variability noted previously for over 1 hour. Previous tracing in AM 7/27 with moderate variability and multiple 10x10 accels.       Patient seen and examined at bedside, no acute overnight events. No acute complaints. Denies HA, SOB, CP, RUQ/epigastric pain, b/l swelling LE and UE, or vision changes. Pt reports +FM, denies LOF, VB, ctx, N/V, fevers, and chills.    Vital Signs Last 24 Hours  T(C): 36.8 (07-28-21 @ 09:23), Max: 37.6 (07-28-21 @ 01:38)  HR: 81 (07-28-21 @ 09:23) (81 - 98)  BP: 145/85 (07-28-21 @ 09:23) (119/68 - 145/85)  RR: 18 (07-28-21 @ 09:23) (17 - 18)  SpO2: 98% (07-28-21 @ 09:23) (81% - 100%)    Physical Exam:  General: NAD  Abdomen: Soft, non-tender, gravid  Ext: No pain or swelling    Labs:             10.5   11.72 )-----------( 217      ( 07-27 @ 06:39 )             31.8     07-27 @ 06:39    137  |  104  |  12  ----------------------------<  97  3.9   |  19  |  0.61    Ca    9.2      07-27 @ 06:39    TPro  6.3  /  Alb  3.5  /  TBili  <0.2  /  DBili  x   /  AST  38  /  ALT  90  /  AlkPhos  109  07-27 @ 06:39    PT/INR - ( 07-27 @ 06:39 )   PT: 11.1 sec;   INR: 0.96 ratio    PTT - ( 07-27 @ 06:39 )  PTT:28.8 sec    Uric Acid: (07-27 @ 06:39)  4.1      Fibrinogen: (07-27 @ 06:39)  539      LDH: (07-27 @ 06:39)  187        MEDICATIONS  (STANDING):  ascorbic acid 500 milliGRAM(s) Oral daily  aspirin enteric coated 81 milliGRAM(s) Oral daily  ferrous    sulfate 325 milliGRAM(s) Oral two times a day  heparin   Injectable 5000 Unit(s) SubCutaneous every 12 hours  labetalol 200 milliGRAM(s) Oral every 8 hours  NIFEdipine XL 90 milliGRAM(s) Oral every 24 hours    MEDICATIONS  (PRN):

## 2021-07-29 LAB
ALBUMIN SERPL ELPH-MCNC: 3.4 G/DL — SIGNIFICANT CHANGE UP (ref 3.3–5)
ALP SERPL-CCNC: 107 U/L — SIGNIFICANT CHANGE UP (ref 40–120)
ALT FLD-CCNC: 57 U/L — HIGH (ref 4–33)
ANION GAP SERPL CALC-SCNC: 13 MMOL/L — SIGNIFICANT CHANGE UP (ref 7–14)
APTT BLD: 26.2 SEC — LOW (ref 27–36.3)
AST SERPL-CCNC: 24 U/L — SIGNIFICANT CHANGE UP (ref 4–32)
BASOPHILS # BLD AUTO: 0.07 K/UL — SIGNIFICANT CHANGE UP (ref 0–0.2)
BASOPHILS NFR BLD AUTO: 0.6 % — SIGNIFICANT CHANGE UP (ref 0–2)
BILIRUB SERPL-MCNC: <0.2 MG/DL — SIGNIFICANT CHANGE UP (ref 0.2–1.2)
BLD GP AB SCN SERPL QL: NEGATIVE — SIGNIFICANT CHANGE UP
BUN SERPL-MCNC: 9 MG/DL — SIGNIFICANT CHANGE UP (ref 7–23)
CALCIUM SERPL-MCNC: 9.3 MG/DL — SIGNIFICANT CHANGE UP (ref 8.4–10.5)
CHLORIDE SERPL-SCNC: 101 MMOL/L — SIGNIFICANT CHANGE UP (ref 98–107)
CO2 SERPL-SCNC: 20 MMOL/L — LOW (ref 22–31)
CREAT SERPL-MCNC: 0.59 MG/DL — SIGNIFICANT CHANGE UP (ref 0.5–1.3)
EOSINOPHIL # BLD AUTO: 0.13 K/UL — SIGNIFICANT CHANGE UP (ref 0–0.5)
EOSINOPHIL NFR BLD AUTO: 1.1 % — SIGNIFICANT CHANGE UP (ref 0–6)
FIBRINOGEN PPP-MCNC: 543 MG/DL — HIGH (ref 290–520)
GLUCOSE SERPL-MCNC: 90 MG/DL — SIGNIFICANT CHANGE UP (ref 70–99)
HCT VFR BLD CALC: 31.7 % — LOW (ref 34.5–45)
HGB BLD-MCNC: 10.7 G/DL — LOW (ref 11.5–15.5)
IANC: 7.79 K/UL — SIGNIFICANT CHANGE UP (ref 1.5–8.5)
IMM GRANULOCYTES NFR BLD AUTO: 1.9 % — HIGH (ref 0–1.5)
INR BLD: 0.94 RATIO — SIGNIFICANT CHANGE UP (ref 0.88–1.16)
LDH SERPL L TO P-CCNC: 214 U/L — SIGNIFICANT CHANGE UP (ref 135–225)
LYMPHOCYTES # BLD AUTO: 2.69 K/UL — SIGNIFICANT CHANGE UP (ref 1–3.3)
LYMPHOCYTES # BLD AUTO: 22.3 % — SIGNIFICANT CHANGE UP (ref 13–44)
MCHC RBC-ENTMCNC: 31.9 PG — SIGNIFICANT CHANGE UP (ref 27–34)
MCHC RBC-ENTMCNC: 33.8 GM/DL — SIGNIFICANT CHANGE UP (ref 32–36)
MCV RBC AUTO: 94.6 FL — SIGNIFICANT CHANGE UP (ref 80–100)
MONOCYTES # BLD AUTO: 1.17 K/UL — HIGH (ref 0–0.9)
MONOCYTES NFR BLD AUTO: 9.7 % — SIGNIFICANT CHANGE UP (ref 2–14)
NEUTROPHILS # BLD AUTO: 7.79 K/UL — HIGH (ref 1.8–7.4)
NEUTROPHILS NFR BLD AUTO: 64.4 % — SIGNIFICANT CHANGE UP (ref 43–77)
NRBC # BLD: 0 /100 WBCS — SIGNIFICANT CHANGE UP
NRBC # FLD: 0.05 K/UL — HIGH
PLATELET # BLD AUTO: 265 K/UL — SIGNIFICANT CHANGE UP (ref 150–400)
POTASSIUM SERPL-MCNC: 4.1 MMOL/L — SIGNIFICANT CHANGE UP (ref 3.5–5.3)
POTASSIUM SERPL-SCNC: 4.1 MMOL/L — SIGNIFICANT CHANGE UP (ref 3.5–5.3)
PROT SERPL-MCNC: 6.3 G/DL — SIGNIFICANT CHANGE UP (ref 6–8.3)
PROTHROM AB SERPL-ACNC: 10.8 SEC — SIGNIFICANT CHANGE UP (ref 10.6–13.6)
RBC # BLD: 3.35 M/UL — LOW (ref 3.8–5.2)
RBC # FLD: 13.1 % — SIGNIFICANT CHANGE UP (ref 10.3–14.5)
RH IG SCN BLD-IMP: POSITIVE — SIGNIFICANT CHANGE UP
SODIUM SERPL-SCNC: 134 MMOL/L — LOW (ref 135–145)
URATE SERPL-MCNC: 4.4 MG/DL — SIGNIFICANT CHANGE UP (ref 2.5–7)
WBC # BLD: 12.08 K/UL — HIGH (ref 3.8–10.5)
WBC # FLD AUTO: 12.08 K/UL — HIGH (ref 3.8–10.5)

## 2021-07-29 PROCEDURE — 99232 SBSQ HOSP IP/OBS MODERATE 35: CPT | Mod: GC

## 2021-07-29 RX ORDER — ASPIRIN/CALCIUM CARB/MAGNESIUM 324 MG
81 TABLET ORAL DAILY
Refills: 0 | Status: DISCONTINUED | OUTPATIENT
Start: 2021-07-29 | End: 2021-08-07

## 2021-07-29 RX ORDER — HEPARIN SODIUM 5000 [USP'U]/ML
5000 INJECTION INTRAVENOUS; SUBCUTANEOUS EVERY 12 HOURS
Refills: 0 | Status: DISCONTINUED | OUTPATIENT
Start: 2021-07-29 | End: 2021-07-30

## 2021-07-29 RX ORDER — SODIUM CHLORIDE 9 MG/ML
1000 INJECTION, SOLUTION INTRAVENOUS
Refills: 0 | Status: DISCONTINUED | OUTPATIENT
Start: 2021-07-29 | End: 2021-07-29

## 2021-07-29 RX ADMIN — Medication 200 MILLIGRAM(S): at 06:13

## 2021-07-29 RX ADMIN — Medication 81 MILLIGRAM(S): at 11:30

## 2021-07-29 RX ADMIN — Medication 200 MILLIGRAM(S): at 22:37

## 2021-07-29 RX ADMIN — Medication 90 MILLIGRAM(S): at 13:34

## 2021-07-29 RX ADMIN — HEPARIN SODIUM 5000 UNIT(S): 5000 INJECTION INTRAVENOUS; SUBCUTANEOUS at 11:30

## 2021-07-29 RX ADMIN — Medication 325 MILLIGRAM(S): at 22:37

## 2021-07-29 RX ADMIN — Medication 325 MILLIGRAM(S): at 11:29

## 2021-07-29 RX ADMIN — Medication 200 MILLIGRAM(S): at 13:35

## 2021-07-29 RX ADMIN — Medication 500 MILLIGRAM(S): at 11:29

## 2021-07-29 NOTE — CHART NOTE - NSCHARTNOTEFT_GEN_A_CORE
Patient placed on NST this morning; Minimal variability revealed; pt was initially placed NPO and given IV hydration. The tracing improved to 150 minimal to moderate variability with no decelerations and no accels. FHT reviewed with Dr. Rodríguez on MFM rounds. Pt cleared to come off monitor and place back on at 1 pm for NST TID 1 hour. BPP done overnight . Pt denies contx, VB, LOF. Endorses +FM.       PE:  ICU Vital Signs Last 24 Hrs  T(C): 36.8 (2021 09:37), Max: 37.2 (2021 14:24)  T(F): 98.3 (2021 09:37), Max: 99 (2021 14:24)  HR: 83 (2021 09:37) (83 - 100)  BP: 119/69 (2021 09:37) (113/59 - 134/76)  BP(mean): --  ABP: --  ABP(mean): --  RR: 18 (2021 09:37) (17 - 18)  SpO2: 98% (2021 09:37) (98% - 100%)  EFM: 150 minimal to moderate variability + acels no decels  [place]: none                            10.7   12.08 )-----------( 265      ( 2021 09:51 )             31.7       07-29    134<L>  |  101  |  9   ----------------------------<  90  4.1   |  20<L>  |  0.59    Ca    9.3      2021 09:51    TPro  6.3  /  Alb  3.4  /  TBili  <0.2  /  DBili  x   /  AST  24  /  ALT  57<H>  /  AlkPhos  107  07-29                  PT/INR - ( 2021 09:51 )   PT: 10.8 sec;   INR: 0.94 ratio         PTT - ( 2021 09:51 )  PTT:26.2 sec    Lactate Trend           @ 29w1d admitted for siPEC w/severe features and IUGR/ AEDV  - HELLP labs WNL  - NST for 1pm for 1 hr  - continue to closely monitor  - for BPP three times per week with dopplers      d/w Dr Anne PRAKASH and Gab Mchugh FNP-BC Patient placed on NST this morning; Minimal variability revealed; pt was initially placed NPO and given IV hydration. The tracing improved to 150 minimal to moderate variability with no decelerations and no accels. FHT reviewed with Dr. Rodríguez on MFM rounds. Pt cleared to come off monitor and place back on at 1 pm for NST TID 1 hour. BPP done overnight . Pt denies contx, VB, LOF. Endorses +FM.       PE:  ICU Vital Signs Last 24 Hrs  T(C): 36.8 (2021 09:37), Max: 37.2 (2021 14:24)  T(F): 98.3 (2021 09:37), Max: 99 (2021 14:24)  HR: 83 (2021 09:37) (83 - 100)  BP: 119/69 (2021 09:37) (113/59 - 134/76)  BP(mean): --  ABP: --  ABP(mean): --  RR: 18 (2021 09:37) (17 - 18)  SpO2: 98% (2021 09:37) (98% - 100%)  EFM: 150 minimal to moderate variability + acels no decels  [place]: none                            10.7   12.08 )-----------( 265      ( 2021 09:51 )             31.7       07-29    134<L>  |  101  |  9   ----------------------------<  90  4.1   |  20<L>  |  0.59    Ca    9.3      2021 09:51    TPro  6.3  /  Alb  3.4  /  TBili  <0.2  /  DBili  x   /  AST  24  /  ALT  57<H>  /  AlkPhos  107  07-29                  PT/INR - ( 2021 09:51 )   PT: 10.8 sec;   INR: 0.94 ratio         PTT - ( 2021 09:51 )  PTT:26.2 sec    Lactate Trend           @ 29w1d admitted for siPEC w/severe features and IUGR/ AEDV  - HELLP labs WNL  - NST for 1pm for 1 hr  - continue to closely monitor  - for BPP three times per week with dopplers      d/w Dr Rodríguez MFSUSANNE and Gab Mchugh FNP-BC    Att Ob:  Pt seen and examined by me today. I agree with findings, assessment and plan documented in NP note. 1 pair

## 2021-07-29 NOTE — PROGRESS NOTE ADULT - ASSESSMENT
A/P: 41yo  @29w1d admitted for siPEC vs cHTN exacerbation newly diagnosed IUGR <2%tile w/ AEDV. BPs in normal range in last 24 hrs. Tracing not reactive however fetal status reassuring to continue TID monitoring. Getting 3 times weekly BPP. Labs overall wnl will follow LFTs. MFM will cnt to follow.    Patient seen with Dr. Rodríguez (MFM attending)    Neri Haile M.D. PGY-5  Maternal Fetal Medicine Fellow   A/P: 39yo  at 29w1d admitted for siPEC vs cHTN exacerbation newly diagnosed IUGR <2%tile w/ AEDV. BPs in normal range in last 24 hrs. Tracing not reactive however fetal status reassuring to continue TID monitoring. Getting 3 times weekly BPP. Labs overall wnl will follow LFTs. MFM will cnt to follow.    Patient seen with Dr. Rodríguez (M attending)    Neri Haile M.D. PGY-5  Maternal Fetal Medicine Fellow

## 2021-07-29 NOTE — PROGRESS NOTE ADULT - SUBJECTIVE AND OBJECTIVE BOX
R3 OB ANTEPARTUM NOTE    Pt seen and examined at bedside for NST w/infrequent 10x10 accelerations and occasional minimal variability.  Pt w/o acute complaints. +FM. Denies LOF, CTX, VB. Denies HA, vision changes, RUQ or epigastric pain.  Denies CP, SOB, N/V, fevers/chills, swelling. Tolerating regular diet. Voiding freely. Ambulating without difficulty.     Vital Signs Last 24 Hours  T(C): 37.2 (07-29-21 @ 01:29), Max: 37.2 (07-28-21 @ 14:24)  HR: 95 (07-29-21 @ 01:29) (81 - 100)  BP: 113/59 (07-29-21 @ 01:29) (113/59 - 145/85)  RR: 17 (07-29-21 @ 01:29) (17 - 18)  SpO2: 98% (07-29-21 @ 01:29) (98% - 100%)    Physical Exam:  General: NAD  CV: RRR  Chest: nonlabored breathing  Abdomen: Soft, non-tender, gravid  Ext: No pain or swelling  Neuro: +2DTR    Labs:             10.5   11.72 )-----------( 217      ( 07-27 @ 06:39 )             31.8     07-27 @ 06:39    137  |  104  |  12  ----------------------------<  97  3.9   |  19  |  0.61    Ca    9.2      07-27 @ 06:39    TPro  6.3  /  Alb  3.5  /  TBili  <0.2  /  DBili  x   /  AST  38  /  ALT  90  /  AlkPhos  109  07-27 @ 06:39    PT/INR - ( 07-27 @ 06:39 )   PT: 11.1 sec;   INR: 0.96 ratio    PTT - ( 07-27 @ 06:39 )  PTT:28.8 sec    Uric Acid: (07-27 @ 06:39)  4.1      Fibrinogen: (07-27 @ 06:39)  539      LDH: (07-27 @ 06:39)  187        MEDICATIONS  (STANDING):  ascorbic acid 500 milliGRAM(s) Oral daily  aspirin enteric coated 81 milliGRAM(s) Oral daily  ferrous    sulfate 325 milliGRAM(s) Oral two times a day  heparin   Injectable 5000 Unit(s) SubCutaneous every 12 hours  labetalol 200 milliGRAM(s) Oral every 8 hours  NIFEdipine XL 90 milliGRAM(s) Oral every 24 hours    BPP (7/29): VTX, LUCERO 18, 8/8

## 2021-07-29 NOTE — PROGRESS NOTE ADULT - SUBJECTIVE AND OBJECTIVE BOX
MFM Fellow Progress Note      S: pt feeling well this morning no complaints. denies VB/LOF/CTX +FM    O  Vital Signs Last 24 Hrs  T(C): 37 (29 Jul 2021 14:07), Max: 37.2 (29 Jul 2021 01:29)  T(F): 98.6 (29 Jul 2021 14:07), Max: 98.9 (29 Jul 2021 01:29)  HR: 94 (29 Jul 2021 14:07) (83 - 100)  BP: 139/74 (29 Jul 2021 14:07) (113/59 - 139/74)  BP(mean): --  RR: 17 (29 Jul 2021 14:07) (17 - 18)  SpO2: 99% (29 Jul 2021 14:07) (98% - 99%)  Gen: well appearing  Abd: soft non tender gravid                            10.7   12.08 )-----------( 265      ( 29 Jul 2021 09:51 )             31.7     07-29    134<L>  |  101  |  9   ----------------------------<  90  4.1   |  20<L>  |  0.59    Ca    9.3      29 Jul 2021 09:51    TPro  6.3  /  Alb  3.4  /  TBili  <0.2  /  DBili  x   /  AST  24  /  ALT  57<H>  /  AlkPhos  107  07-29    EFM: baseline 150 min to mod variability int variables  [place]: none MFM Fellow Progress Note      S: pt feeling well this morning no complaints. denies VB/LOF/CTX +FM    O  Vital Signs Last 24 Hrs  T(C): 37 (29 Jul 2021 14:07), Max: 37.2 (29 Jul 2021 01:29)  T(F): 98.6 (29 Jul 2021 14:07), Max: 98.9 (29 Jul 2021 01:29)  HR: 94 (29 Jul 2021 14:07) (83 - 100)  BP: 139/74 (29 Jul 2021 14:07) (113/59 - 139/74)  RR: 17 (29 Jul 2021 14:07) (17 - 18)  SpO2: 99% (29 Jul 2021 14:07) (98% - 99%)  Gen: well appearing  Abd: soft non tender gravid                            10.7   12.08 )-----------( 265      ( 29 Jul 2021 09:51 )             31.7     07-29    134<L>  |  101  |  9   ----------------------------<  90  4.1   |  20<L>  |  0.59    Ca    9.3      29 Jul 2021 09:51    TPro  6.3  /  Alb  3.4  /  TBili  <0.2  /  DBili  x   /  AST  24  /  ALT  57<H>  /  AlkPhos  107  07-29    EFM: baseline 150 min to mod variability int variables  [place]: none

## 2021-07-29 NOTE — PROGRESS NOTE ADULT - ASSESSMENT
39yo  @ 29w1d (by LMP (21) SAKINA 10/13/21) admitted for siPEC w/severe features vs cHTN exacerbation. Lab abnormalities notable for isolated elevated LFTs, labwork overall wnl. -130/50-80s. NST w/infrequent 10x10 accelerations and occasional minimal variability. BPP performed, .     #siPEC w/severe features vs cHTN exacerbation  - c/w Labetalol 200 mg TID, Procardia XL to 90mg  - s/p Procardia 10 IR ()  - 24hr urine (): 179  - HELLP labs q3d ()  - monitor BPs, neuro function, strict I&O    #Isolated Elevated LFTs  - RUQ Sono (): contracted gallbladder, no obvious stones  - hepatitis panel (): neg  - continue trending CMP q3d, downtrending at this time    #fetal well being  - change in characteristic tracing overnight, monitoring early as above noted  - NST BID  - S/p BMZ (-)  - MFM (): VTX, posterior placenta, LUCERO 20, UAD elevated with intermittent AEDV, BPP   - MFM (): VTX, posterior placenta, LUCERO 16.6, EFW 941g (2%, AC <3%), S/D 8.78, +AEDV  - GBS positive    #maternal well being  - Reg, SLIV  - COVID neg    Marianne Carlos R3

## 2021-07-30 RX ORDER — MAGNESIUM SULFATE 500 MG/ML
4 VIAL (ML) INJECTION ONCE
Refills: 0 | Status: COMPLETED | OUTPATIENT
Start: 2021-07-30 | End: 2021-07-30

## 2021-07-30 RX ORDER — HEPARIN SODIUM 5000 [USP'U]/ML
5000 INJECTION INTRAVENOUS; SUBCUTANEOUS EVERY 12 HOURS
Refills: 0 | Status: DISCONTINUED | OUTPATIENT
Start: 2021-07-30 | End: 2021-08-07

## 2021-07-30 RX ORDER — SODIUM CHLORIDE 9 MG/ML
1000 INJECTION, SOLUTION INTRAVENOUS
Refills: 0 | Status: DISCONTINUED | OUTPATIENT
Start: 2021-07-30 | End: 2021-07-30

## 2021-07-30 RX ORDER — MAGNESIUM SULFATE 500 MG/ML
2 VIAL (ML) INJECTION
Qty: 40 | Refills: 0 | Status: DISCONTINUED | OUTPATIENT
Start: 2021-07-30 | End: 2021-07-30

## 2021-07-30 RX ADMIN — Medication 200 MILLIGRAM(S): at 06:09

## 2021-07-30 RX ADMIN — HEPARIN SODIUM 5000 UNIT(S): 5000 INJECTION INTRAVENOUS; SUBCUTANEOUS at 18:11

## 2021-07-30 RX ADMIN — Medication 50 GM/HR: at 07:14

## 2021-07-30 RX ADMIN — Medication 200 GRAM(S): at 05:19

## 2021-07-30 RX ADMIN — Medication 90 MILLIGRAM(S): at 13:37

## 2021-07-30 RX ADMIN — Medication 500 MILLIGRAM(S): at 18:11

## 2021-07-30 RX ADMIN — Medication 325 MILLIGRAM(S): at 10:56

## 2021-07-30 RX ADMIN — Medication 50 GM/HR: at 05:40

## 2021-07-30 RX ADMIN — Medication 81 MILLIGRAM(S): at 13:39

## 2021-07-30 RX ADMIN — Medication 325 MILLIGRAM(S): at 18:12

## 2021-07-30 RX ADMIN — Medication 200 MILLIGRAM(S): at 13:39

## 2021-07-30 RX ADMIN — SODIUM CHLORIDE 50 MILLILITER(S): 9 INJECTION, SOLUTION INTRAVENOUS at 06:48

## 2021-07-30 NOTE — CHART NOTE - NSCHARTNOTEFT_GEN_A_CORE
MFM Fellow Chart Note    I personally performed a BPP for this patient   8/8 all elements obtained within 10 minutes  Continue TID monitoring    Neri Haile M.D. PGY-5  MFM Fellow

## 2021-07-30 NOTE — PROGRESS NOTE ADULT - SUBJECTIVE AND OBJECTIVE BOX
R3 OB ANTEPARTUM NOTE    Pt seen and evaluated at bedside for fetal evaluation in setting of nonreactive NST with periods of minimal variability and 2x spontaneous FHR decelerations with spontaneous recovery.     No acute complaints. +FM. Denies LOF, VB, CTX. Denies HA, epigastric pain, blurred vision, CP, SOB, N/V, fevers/chills.  Tolerating regular diet. Voiding freely. Ambulating without difficulty.     Vital Signs Last 24 Hours  T(C): 36.9 (07-30-21 @ 01:47), Max: 37 (07-29-21 @ 06:02)  HR: 88 (07-30-21 @ 01:47) (83 - 94)  BP: 126/78 (07-30-21 @ 01:47) (119/69 - 139/74)  RR: 17 (07-30-21 @ 01:47) (16 - 18)  SpO2: 98% (07-30-21 @ 01:47) (98% - 99%)    Physical Exam:  General: NAD  CV: RRR  Chest: nonlabored breathing  Abdomen: Soft, non-tender, gravid  Ext: No pain or swelling  Neuro: +2DTR    Labs:             10.7   12.08 )-----------( 265      ( 07-29 @ 09:51 )             31.7     07-29 @ 09:51    134  |  101  |  9   ----------------------------<  90  4.1   |  20  |  0.59    Ca    9.3      07-29 @ 09:51    TPro  6.3  /  Alb  3.4  /  TBili  <0.2  /  DBili  x   /  AST  24  /  ALT  57  /  AlkPhos  107  07-29 @ 09:51    PT/INR - ( 07-29 @ 09:51 )   PT: 10.8 sec;   INR: 0.94 ratio    PTT - ( 07-29 @ 09:51 )  PTT:26.2 sec    Uric Acid: (07-29 @ 09:51)  4.4      Fibrinogen: (07-29 @ 09:51)  543      LDH: (07-29 @ 09:51)  214        MEDICATIONS  (STANDING):  ascorbic acid 500 milliGRAM(s) Oral daily  aspirin enteric coated 81 milliGRAM(s) Oral daily  ferrous    sulfate 325 milliGRAM(s) Oral two times a day  labetalol 200 milliGRAM(s) Oral every 8 hours  magnesium sulfate  IVPB 4 Gram(s) IV Intermittent once  magnesium sulfate Infusion 2 Gm/Hr (50 mL/Hr) IV Continuous <Continuous>  NIFEdipine XL 90 milliGRAM(s) Oral every 24 hours    BPP (7/30): VTX, LUCERO 20, 8/8

## 2021-07-30 NOTE — PROGRESS NOTE ADULT - ASSESSMENT
41yo  @ 29w2d (by LMP (21) SAKINA 10/13/21) admitted for siPEC w/severe features vs cHTN exacerbation. Lab abnormalities notable for isolated elevated LFTs, labwork overall wnl. Overnight NST NR with periods of minimal variability and 2x spontaneous FHR decelerations with spontaneous recovery. BPP 8/8.     #fetal well being  - Overnight NST NR with periods of minimal variability and 2x spontaneous FHR decelerations with spontaneous recovery. BPP 8/8. Discussed with MFM O/N (Dr. Mckay MFM Fellow -> d/w Dr. Galvan) and decision made to closely monitor pt on floor  - MgSO4 for fetal neuro protection ordered  - continuous fetal monitoring in PACU  - S/p BMZ (-)  - MFM (): VTX, posterior placenta, LUCERO 20, UAD elevated with intermittent AEDV, BPP 8/8  - MFM (): VTX, posterior placenta, LUCERO 16.6, EFW 941g (2%, AC <3%), S/D 8.78, +AEDV  - GBS positive    #siPEC w/severe features vs cHTN exacerbation  - c/w Labetalol 200 mg TID, Procardia XL to 90mg  - s/p Procardia 10 IR ()  - 24hr urine (): 179  - HELLP labs q3d ()  - monitor BPs, neuro function, strict I&O    #Isolated Elevated LFTs  - RUQ Sono (): contracted gallbladder, no obvious stones  - hepatitis panel (): neg  - continue trending CMP q3d, downtrending at this time    #maternal well being  - NPO, LR@50  - COVID neg  - VTE ppx: SCDs; holding HSQ  - MOD: repeat C/S    Marianne Carlos R3

## 2021-07-31 RX ADMIN — Medication 200 MILLIGRAM(S): at 09:52

## 2021-07-31 RX ADMIN — Medication 81 MILLIGRAM(S): at 13:03

## 2021-07-31 RX ADMIN — HEPARIN SODIUM 5000 UNIT(S): 5000 INJECTION INTRAVENOUS; SUBCUTANEOUS at 22:15

## 2021-07-31 RX ADMIN — HEPARIN SODIUM 5000 UNIT(S): 5000 INJECTION INTRAVENOUS; SUBCUTANEOUS at 09:41

## 2021-07-31 RX ADMIN — Medication 500 MILLIGRAM(S): at 13:03

## 2021-07-31 RX ADMIN — Medication 325 MILLIGRAM(S): at 09:41

## 2021-07-31 RX ADMIN — Medication 200 MILLIGRAM(S): at 17:58

## 2021-07-31 RX ADMIN — Medication 325 MILLIGRAM(S): at 22:14

## 2021-07-31 RX ADMIN — Medication 90 MILLIGRAM(S): at 13:03

## 2021-07-31 NOTE — PROGRESS NOTE ADULT - ASSESSMENT
39yo  @ 29w3d (by LMP (21) SAKINA 10/13/21) admitted for siPEC w/severe features vs cHTN exacerbation. Lab abnormalities notable for isolated elevated LFTs, labwork overall wnl. -130/50-80s. NST w/infrequent 10x10 accelerations and occasional minimal variability. BPP performed, .     #siPEC w/severe features vs cHTN exacerbation  - c/w Labetalol 200 mg TID, Procardia XL to 90mg  - s/p Procardia 10 IR ()  - 24hr urine (): 179  - HELLP labs q3d ()  - monitor BPs, neuro function, strict I&O    #Isolated Elevated LFTs  - RUQ Sono (): contracted gallbladder, no obvious stones  - hepatitis panel (): neg  - continue trending CMP q3d, downtrending at this time    #fetal well being  - change in characteristic tracing overnight, monitoring early as above noted  - NST BID  - : monitored in PACU w/Mg for fetal neuro protection 2/2 NR NST w/spontaneous decels, BPP   - S/p BMZ (-)  - MFM (): VTX, posterior placenta, LUCERO 20, UAD elevated with intermittent AEDV, BPP 8/8  - MFM (): VTX, posterior placenta, LUCERO 16.6, EFW 941g (2%, AC <3%), S/D 8.78, +AEDV  - GBS positive    #maternal well being  - Reg, SLIV  - COVID neg  - VTE ppx: HSQ, OOB  - MOD: repeat C/S    Marianne Carlos R3

## 2021-07-31 NOTE — PROGRESS NOTE ADULT - SUBJECTIVE AND OBJECTIVE BOX
R3 OB ANTEPARTUM NOTE    Pt seen and evaluated at bedside for fetal evaluation in setting of NST with periods of minimal variability and 2x spontaneous FHR variable decelerations with spontaneous recovery. NST reactive; BPP 8/8.     No acute complaints. +FM. Denies LOF, VB, CTX. Denies HA, epigastric pain, blurred vision, CP, SOB, N/V, fevers/chills.  Tolerating regular diet. Voiding freely. Ambulating without difficulty.     Vital Signs Last 24 Hours  T(C): 36.8 (07-30-21 @ 22:21), Max: 37.0 (07-30-21 @ 05:00)  HR: 93 (07-30-21 @ 22:21) (72 - 98)  BP: 116/63 (07-30-21 @ 22:21) (103/61 - 141/85)  RR: 17 (07-30-21 @ 22:21) (17 - 17)  SpO2: 97% (07-30-21 @ 22:21) (97% - 100%)    CAPILLARY BLOOD GLUCOSE    Physical Exam:  General: NAD  CV: RRR  Chest: nonlabored breathing  Abdomen: Soft, non-tender, gravid  Ext: No pain or swelling  Neuro: +2DTR      Labs:             10.7   12.08 )-----------( 265      ( 07-29 @ 09:51 )             31.7     07-29 @ 09:51    134  |  101  |  9   ----------------------------<  90  4.1   |  20  |  0.59    Ca    9.3      07-29 @ 09:51    TPro  6.3  /  Alb  3.4  /  TBili  <0.2  /  DBili  x   /  AST  24  /  ALT  57  /  AlkPhos  107  07-29 @ 09:51    PT/INR - ( 07-29 @ 09:51 )   PT: 10.8 sec;   INR: 0.94 ratio    PTT - ( 07-29 @ 09:51 )  PTT:26.2 sec    Uric Acid: (07-29 @ 09:51)  4.4      Fibrinogen: (07-29 @ 09:51)  543      LDH: (07-29 @ 09:51)  214        MEDICATIONS  (STANDING):  ascorbic acid 500 milliGRAM(s) Oral daily  aspirin enteric coated 81 milliGRAM(s) Oral daily  ferrous    sulfate 325 milliGRAM(s) Oral two times a day  heparin   Injectable 5000 Unit(s) SubCutaneous every 12 hours  labetalol 200 milliGRAM(s) Oral every 8 hours  NIFEdipine XL 90 milliGRAM(s) Oral every 24 hours    MEDICATIONS  (PRN):

## 2021-08-01 LAB
ALBUMIN SERPL ELPH-MCNC: 3.4 G/DL — SIGNIFICANT CHANGE UP (ref 3.3–5)
ALP SERPL-CCNC: 110 U/L — SIGNIFICANT CHANGE UP (ref 40–120)
ALT FLD-CCNC: 32 U/L — SIGNIFICANT CHANGE UP (ref 4–33)
ANION GAP SERPL CALC-SCNC: 13 MMOL/L — SIGNIFICANT CHANGE UP (ref 7–14)
APTT BLD: 33.3 SEC — SIGNIFICANT CHANGE UP (ref 27–36.3)
AST SERPL-CCNC: 17 U/L — SIGNIFICANT CHANGE UP (ref 4–32)
BASOPHILS # BLD AUTO: 0.06 K/UL — SIGNIFICANT CHANGE UP (ref 0–0.2)
BASOPHILS NFR BLD AUTO: 0.5 % — SIGNIFICANT CHANGE UP (ref 0–2)
BILIRUB SERPL-MCNC: <0.2 MG/DL — SIGNIFICANT CHANGE UP (ref 0.2–1.2)
BLD GP AB SCN SERPL QL: NEGATIVE — SIGNIFICANT CHANGE UP
BUN SERPL-MCNC: 12 MG/DL — SIGNIFICANT CHANGE UP (ref 7–23)
CALCIUM SERPL-MCNC: 9.4 MG/DL — SIGNIFICANT CHANGE UP (ref 8.4–10.5)
CHLORIDE SERPL-SCNC: 102 MMOL/L — SIGNIFICANT CHANGE UP (ref 98–107)
CO2 SERPL-SCNC: 20 MMOL/L — LOW (ref 22–31)
CREAT SERPL-MCNC: 0.68 MG/DL — SIGNIFICANT CHANGE UP (ref 0.5–1.3)
EOSINOPHIL # BLD AUTO: 0.18 K/UL — SIGNIFICANT CHANGE UP (ref 0–0.5)
EOSINOPHIL NFR BLD AUTO: 1.5 % — SIGNIFICANT CHANGE UP (ref 0–6)
FIBRINOGEN PPP-MCNC: 610 MG/DL — HIGH (ref 290–520)
GLUCOSE SERPL-MCNC: 95 MG/DL — SIGNIFICANT CHANGE UP (ref 70–99)
HCT VFR BLD CALC: 34.3 % — LOW (ref 34.5–45)
HGB BLD-MCNC: 11.4 G/DL — LOW (ref 11.5–15.5)
IANC: 7.11 K/UL — SIGNIFICANT CHANGE UP (ref 1.5–8.5)
IMM GRANULOCYTES NFR BLD AUTO: 1.9 % — HIGH (ref 0–1.5)
INR BLD: 0.95 RATIO — SIGNIFICANT CHANGE UP (ref 0.88–1.16)
LDH SERPL L TO P-CCNC: 168 U/L — SIGNIFICANT CHANGE UP (ref 135–225)
LYMPHOCYTES # BLD AUTO: 26.6 % — SIGNIFICANT CHANGE UP (ref 13–44)
LYMPHOCYTES # BLD AUTO: 3.15 K/UL — SIGNIFICANT CHANGE UP (ref 1–3.3)
MCHC RBC-ENTMCNC: 31.8 PG — SIGNIFICANT CHANGE UP (ref 27–34)
MCHC RBC-ENTMCNC: 33.2 GM/DL — SIGNIFICANT CHANGE UP (ref 32–36)
MCV RBC AUTO: 95.8 FL — SIGNIFICANT CHANGE UP (ref 80–100)
MONOCYTES # BLD AUTO: 1.12 K/UL — HIGH (ref 0–0.9)
MONOCYTES NFR BLD AUTO: 9.5 % — SIGNIFICANT CHANGE UP (ref 2–14)
NEUTROPHILS # BLD AUTO: 7.11 K/UL — SIGNIFICANT CHANGE UP (ref 1.8–7.4)
NEUTROPHILS NFR BLD AUTO: 60 % — SIGNIFICANT CHANGE UP (ref 43–77)
NRBC # BLD: 0 /100 WBCS — SIGNIFICANT CHANGE UP
NRBC # FLD: 0 K/UL — SIGNIFICANT CHANGE UP
PLATELET # BLD AUTO: 303 K/UL — SIGNIFICANT CHANGE UP (ref 150–400)
POTASSIUM SERPL-MCNC: 4.3 MMOL/L — SIGNIFICANT CHANGE UP (ref 3.5–5.3)
POTASSIUM SERPL-SCNC: 4.3 MMOL/L — SIGNIFICANT CHANGE UP (ref 3.5–5.3)
PROT SERPL-MCNC: 6.6 G/DL — SIGNIFICANT CHANGE UP (ref 6–8.3)
PROTHROM AB SERPL-ACNC: 10.9 SEC — SIGNIFICANT CHANGE UP (ref 10.6–13.6)
RBC # BLD: 3.58 M/UL — LOW (ref 3.8–5.2)
RBC # FLD: 13 % — SIGNIFICANT CHANGE UP (ref 10.3–14.5)
RH IG SCN BLD-IMP: POSITIVE — SIGNIFICANT CHANGE UP
SODIUM SERPL-SCNC: 135 MMOL/L — SIGNIFICANT CHANGE UP (ref 135–145)
URATE SERPL-MCNC: 5.2 MG/DL — SIGNIFICANT CHANGE UP (ref 2.5–7)
WBC # BLD: 11.84 K/UL — HIGH (ref 3.8–10.5)
WBC # FLD AUTO: 11.84 K/UL — HIGH (ref 3.8–10.5)

## 2021-08-01 PROCEDURE — 99232 SBSQ HOSP IP/OBS MODERATE 35: CPT | Mod: GC

## 2021-08-01 RX ADMIN — Medication 325 MILLIGRAM(S): at 22:34

## 2021-08-01 RX ADMIN — Medication 90 MILLIGRAM(S): at 13:24

## 2021-08-01 RX ADMIN — HEPARIN SODIUM 5000 UNIT(S): 5000 INJECTION INTRAVENOUS; SUBCUTANEOUS at 22:35

## 2021-08-01 RX ADMIN — Medication 500 MILLIGRAM(S): at 12:31

## 2021-08-01 RX ADMIN — Medication 200 MILLIGRAM(S): at 03:07

## 2021-08-01 RX ADMIN — HEPARIN SODIUM 5000 UNIT(S): 5000 INJECTION INTRAVENOUS; SUBCUTANEOUS at 09:55

## 2021-08-01 RX ADMIN — Medication 325 MILLIGRAM(S): at 09:55

## 2021-08-01 RX ADMIN — Medication 81 MILLIGRAM(S): at 12:31

## 2021-08-01 NOTE — PROGRESS NOTE ADULT - SUBJECTIVE AND OBJECTIVE BOX
RN spoke to Sydnee  Reports that she did try the xanax and it seemed to make her symptoms worse and she preferred to stay on the ativan  Xanax removed from her chart  Rx sent to NP to refill  Scheduled follow up for 8/9   R3 Antepartum Note, HD#9    Interval events: Pt with NRNST x 2 hours s/p BPP 8/8. Upon review of tracing, noted to have 10 minute spontaneous deceleration with return to baseline. Patient placed back on FHT monitor  BPP: 8/8, VTX, MVP 6.86    Patient seen and examined at bedside. No acute complaints. Pt reports +FM, denies LOF, VB, ctx, HA, epigastric pain, blurred vision, CP, SOB, N/V, fevers, and chills.    Vital Signs Last 24 Hours  T(C): 37.2 (08-01-21 @ 01:42), Max: 37.2 (07-31-21 @ 22:14)  HR: 84 (08-01-21 @ 03:06) (79 - 90)  BP: 128/72 (08-01-21 @ 03:05) (108/58 - 130/82)  RR: 17 (08-01-21 @ 01:42) (15 - 18)  SpO2: 98% (08-01-21 @ 03:06) (95% - 99%)    CAPILLARY BLOOD GLUCOSE          Physical Exam:  General: NAD  Abdomen: Soft, non-tender, gravid  Ext: No pain or swelling    Labs:        PT/INR - ( 07-29 @ 09:51 )   PT: 10.8 sec;   INR: 0.94 ratio    PTT - ( 07-29 @ 09:51 )  PTT:26.2 sec    Uric Acid: (07-29 @ 09:51)  4.4      Fibrinogen: (07-29 @ 09:51)  543      LDH: (07-29 @ 09:51)  214        MEDICATIONS  (STANDING):  ascorbic acid 500 milliGRAM(s) Oral daily  aspirin enteric coated 81 milliGRAM(s) Oral daily  ferrous    sulfate 325 milliGRAM(s) Oral two times a day  heparin   Injectable 5000 Unit(s) SubCutaneous every 12 hours  labetalol 200 milliGRAM(s) Oral every 8 hours  NIFEdipine XL 90 milliGRAM(s) Oral every 24 hours    MEDICATIONS  (PRN):

## 2021-08-01 NOTE — PROGRESS NOTE ADULT - ASSESSMENT
39yo  @ 29w4d (by LMP (21) SAKINA 10/13/21) admitted for siPEC w/severe features vs cHTN exacerbation. Lab abnormalities notable for isolated elevated LFTs, lab work overall wnl. NST not reactive overnight occasional minimal variability s/p BPP 8/8 however 10 minute deceleration so patient placed back on fetal monitoring.     #siPEC w/severe features vs cHTN exacerbation  - c/w Labetalol 200 mg TID, Procardia XL to 90mg  - s/p Procardia 10 IR ()  - 24hr urine (): 179  - HELLP labs q3d ()  - monitor BPs, neuro function, strict I&O    #Isolated Elevated LFTs  - RUQ Sono (): contracted gallbladder, no obvious stones  - hepatitis panel (): neg  - continue trending CMP q3d, downtrending at this time    #fetal well being  - 10 minute deceleration, continue monitoring; BPP 8/8  - NST BID  - : monitored in PACU w/Mg for fetal neuro protection 2/2 NR NST w/spontaneous decels, BPP 8/8  - S/p BMZ (-)  - MFM (): VTX, posterior placenta, LUCERO 20, UAD elevated with intermittent AEDV, BPP 8/8  - MFM (): VTX, posterior placenta, LUCERO 16.6, EFW 941g (2%, AC <3%), S/D 8.78, +AEDV  - GBS positive    #maternal well being  - Reg, SLIV  - COVID neg  - VTE ppx: HSQ, OOB  - MOD: repeat C/S      C. Diamant, PGY-3 39yo  @ 29w4d (by LMP (21) SAKINA 10/13/21) admitted for siPEC w/severe features vs cHTN exacerbation. Lab abnormalities notable for isolated elevated LFTs, lab work overall wnl. NST not reactive overnight occasional minimal variability s/p BPP 8/8 however 10 minute deceleration so patient placed back on fetal monitoring.     #siPEC w/severe features vs cHTN exacerbation  - c/w Labetalol 200 mg TID, Procardia XL to 90mg  - s/p Procardia 10 IR ()  - 24hr urine (): 179  - HELLP labs q3d ()  - monitor BPs, neuro function, strict I&O    #Isolated Elevated LFTs  - RUQ Sono (): contracted gallbladder, no obvious stones  - hepatitis panel (): neg  - continue trending CMP q3d, downtrending at this time    #fetal well being  - 10 minute deceleration, continue monitoring; BPP 8/8  - NST BID  - : monitored in PACU w/Mg for fetal neuro protection 2/2 NR NST w/spontaneous decels, BPP 8/8  - S/p BMZ (-)  - MFM (): VTX, posterior placenta, LUCERO 20, UAD elevated with intermittent AEDV, BPP 8/8  - MFM (): VTX, posterior placenta, LUCERO 16.6, EFW 941g (2%, AC <3%), S/D 8.78, +AEDV  - GBS positive    #maternal well being  - Reg, SLIV  - COVID neg  - VTE ppx: HSQ, OOB  - MOD: repeat C/S      C. Diamant, PGY-3    MFM FELLOW ADDENDUM:   39yo  at 29w4d admitted with siPEC with fetal IUGR at 2%ile wit AEDV. Initially admitted with BP elevations, BPs now all in the 110s-120s / 70s-80s. Considering fetal perfusion, plan to down titrate medication to Procardia 90XL only and discontinue Labetalol 200mg TID. Will continue to monitor BPs and adjust accordingly.   - Continue with TID monitoring x 1 hr. Fetal tracing with occasional decels  - S/p ACS course -  - twice weekly UAD   - next GS due with 2 week interval, this Fri, Aug 6  - For daily PEC labs given tenuous fetal monitoring with decels noted earlier today     DWAIN Medina Fellow   Seen and d/w DWAIN Costa Attending

## 2021-08-02 ENCOUNTER — ASOB RESULT (OUTPATIENT)
Age: 41
End: 2021-08-02

## 2021-08-02 ENCOUNTER — APPOINTMENT (OUTPATIENT)
Dept: ANTEPARTUM | Facility: HOSPITAL | Age: 41
End: 2021-08-02
Payer: COMMERCIAL

## 2021-08-02 PROCEDURE — 76821 MIDDLE CEREBRAL ARTERY ECHO: CPT | Mod: 26

## 2021-08-02 PROCEDURE — 76820 UMBILICAL ARTERY ECHO: CPT | Mod: 26

## 2021-08-02 PROCEDURE — 76819 FETAL BIOPHYS PROFIL W/O NST: CPT | Mod: 26

## 2021-08-02 PROCEDURE — 99232 SBSQ HOSP IP/OBS MODERATE 35: CPT | Mod: GC

## 2021-08-02 RX ADMIN — HEPARIN SODIUM 5000 UNIT(S): 5000 INJECTION INTRAVENOUS; SUBCUTANEOUS at 22:46

## 2021-08-02 RX ADMIN — Medication 325 MILLIGRAM(S): at 22:46

## 2021-08-02 RX ADMIN — Medication 81 MILLIGRAM(S): at 11:24

## 2021-08-02 RX ADMIN — Medication 90 MILLIGRAM(S): at 14:04

## 2021-08-02 RX ADMIN — Medication 325 MILLIGRAM(S): at 11:25

## 2021-08-02 RX ADMIN — Medication 500 MILLIGRAM(S): at 14:04

## 2021-08-02 RX ADMIN — HEPARIN SODIUM 5000 UNIT(S): 5000 INJECTION INTRAVENOUS; SUBCUTANEOUS at 11:24

## 2021-08-02 NOTE — PROGRESS NOTE ADULT - ASSESSMENT
39yo  @ 29w5d (by LMP (21) SAKINA 10/13/21) admitted for siPEC w/severe features vs cHTN exacerbation. Lab abnormalities notable for isolated elevated LFTs, lab work overall wnl. Patient with intermittent NRNST, however FHT improving after discontinued labetalol yesterday. BPs overall well controlled on Procardia 90XL.    #siPEC w/severe features vs cHTN exacerbation  - c/w Procardia XL to 90mg; s/p Labetalol 200 mg TID  - s/p Procardia 10 IR ()  - 24hr urine (): 179  - HELLP labs q3d ()  - monitor BPs, neuro function, strict I&O    #Isolated Elevated LFTs  - RUQ Sono (): contracted gallbladder, no obvious stones  - hepatitis panel (): neg  - continue trending CMP q3d, downtrending at this time    #fetal well being  - BPP 8/8  - NST BID  - : monitored in PACU w/Mg for fetal neuro protection 2/2 NR NST w/spontaneous decels, BPP 8/8  - S/p BMZ (-)  - MFM (): VTX, posterior placenta, LUCERO 20, UAD elevated with intermittent AEDV, BPP 8/8  - MFM (): VTX, posterior placenta, LUCERO 16.6, EFW 941g (2%, AC <3%), S/D 8.78, +AEDV  - GBS positive    #maternal well being  - Reg, SLIV  - COVID neg  - VTE ppx: HSQ, OOB  - MOD: repeat C/S    RFrankel PGY3

## 2021-08-02 NOTE — PROGRESS NOTE ADULT - SUBJECTIVE AND OBJECTIVE BOX
R3 Antepartum Note, HD#    Interval events: No acute events overnight.    Patient seen and examined at bedside. No acute complaints. Labetalol discontinued yesterday due to concern for hypotension induced decelerations. Bps have been well controlled. Pt reports +FM, denies LOF, VB, Ctx, HA, epigastric pain, blurred vision, CP, SOB, N/V, fevers, and chills.    Vital Signs Last 24 Hours  T(C): 36.7 (08-02-21 @ 09:55), Max: 37.4 (08-02-21 @ 05:46)  HR: 86 (08-02-21 @ 09:55) (82 - 100)  BP: 131/75 (08-02-21 @ 09:55) (117/65 - 144/87)  RR: 17 (08-02-21 @ 09:55) (16 - 17)  SpO2: 99% (08-02-21 @ 09:55) (92% - 99%)      Physical Exam:  General: NAD  Abdomen: Soft, non-tender, gravid  Ext: No pain or swelling    NST reactive overnight    Labs:             11.4   11.84 )-----------( 303      ( 08-01 @ 07:27 )             34.3     08-01 @ 07:27    135  |  102  |  12  ----------------------------<  95  4.3   |  20  |  0.68    Ca    9.4      08-01 @ 07:27    TPro  6.6  /  Alb  3.4  /  TBili  <0.2  /  DBili  x   /  AST  17  /  ALT  32  /  AlkPhos  110  08-01 @ 07:27    PT/INR - ( 08-01 @ 07:27 )   PT: 10.9 sec;   INR: 0.95 ratio    PTT - ( 08-01 @ 07:27 )  PTT:33.3 sec    Uric Acid: (08-01 @ 07:27)  5.2      Fibrinogen: (08-01 @ 07:27)  610      LDH: (08-01 @ 07:27)  168        MEDICATIONS  (STANDING):  ascorbic acid 500 milliGRAM(s) Oral daily  aspirin enteric coated 81 milliGRAM(s) Oral daily  ferrous    sulfate 325 milliGRAM(s) Oral two times a day  heparin   Injectable 5000 Unit(s) SubCutaneous every 12 hours  NIFEdipine XL 90 milliGRAM(s) Oral every 24 hours    MEDICATIONS  (PRN):

## 2021-08-03 LAB
ALBUMIN SERPL ELPH-MCNC: 3.6 G/DL — SIGNIFICANT CHANGE UP (ref 3.3–5)
ALP SERPL-CCNC: 119 U/L — SIGNIFICANT CHANGE UP (ref 40–120)
ALT FLD-CCNC: 26 U/L — SIGNIFICANT CHANGE UP (ref 4–33)
ANION GAP SERPL CALC-SCNC: 13 MMOL/L — SIGNIFICANT CHANGE UP (ref 7–14)
APTT BLD: 40.3 SEC — HIGH (ref 27–36.3)
AST SERPL-CCNC: 17 U/L — SIGNIFICANT CHANGE UP (ref 4–32)
BASOPHILS # BLD AUTO: 0.09 K/UL — SIGNIFICANT CHANGE UP (ref 0–0.2)
BASOPHILS NFR BLD AUTO: 0.6 % — SIGNIFICANT CHANGE UP (ref 0–2)
BILIRUB SERPL-MCNC: <0.2 MG/DL — SIGNIFICANT CHANGE UP (ref 0.2–1.2)
BLD GP AB SCN SERPL QL: NEGATIVE — SIGNIFICANT CHANGE UP
BUN SERPL-MCNC: 12 MG/DL — SIGNIFICANT CHANGE UP (ref 7–23)
CALCIUM SERPL-MCNC: 9.5 MG/DL — SIGNIFICANT CHANGE UP (ref 8.4–10.5)
CHLORIDE SERPL-SCNC: 102 MMOL/L — SIGNIFICANT CHANGE UP (ref 98–107)
CO2 SERPL-SCNC: 19 MMOL/L — LOW (ref 22–31)
CREAT SERPL-MCNC: 0.65 MG/DL — SIGNIFICANT CHANGE UP (ref 0.5–1.3)
EOSINOPHIL # BLD AUTO: 0.18 K/UL — SIGNIFICANT CHANGE UP (ref 0–0.5)
EOSINOPHIL NFR BLD AUTO: 1.3 % — SIGNIFICANT CHANGE UP (ref 0–6)
FIBRINOGEN PPP-MCNC: 657 MG/DL — HIGH (ref 290–520)
GLUCOSE SERPL-MCNC: 87 MG/DL — SIGNIFICANT CHANGE UP (ref 70–99)
HCT VFR BLD CALC: 35.4 % — SIGNIFICANT CHANGE UP (ref 34.5–45)
HGB BLD-MCNC: 11.7 G/DL — SIGNIFICANT CHANGE UP (ref 11.5–15.5)
IANC: 8.75 K/UL — HIGH (ref 1.5–8.5)
IMM GRANULOCYTES NFR BLD AUTO: 2.5 % — HIGH (ref 0–1.5)
INR BLD: 0.96 RATIO — SIGNIFICANT CHANGE UP (ref 0.88–1.16)
LDH SERPL L TO P-CCNC: 183 U/L — SIGNIFICANT CHANGE UP (ref 135–225)
LYMPHOCYTES # BLD AUTO: 24 % — SIGNIFICANT CHANGE UP (ref 13–44)
LYMPHOCYTES # BLD AUTO: 3.33 K/UL — HIGH (ref 1–3.3)
MCHC RBC-ENTMCNC: 31 PG — SIGNIFICANT CHANGE UP (ref 27–34)
MCHC RBC-ENTMCNC: 33.1 GM/DL — SIGNIFICANT CHANGE UP (ref 32–36)
MCV RBC AUTO: 93.9 FL — SIGNIFICANT CHANGE UP (ref 80–100)
MONOCYTES # BLD AUTO: 1.16 K/UL — HIGH (ref 0–0.9)
MONOCYTES NFR BLD AUTO: 8.4 % — SIGNIFICANT CHANGE UP (ref 2–14)
NEUTROPHILS # BLD AUTO: 8.75 K/UL — HIGH (ref 1.8–7.4)
NEUTROPHILS NFR BLD AUTO: 63.2 % — SIGNIFICANT CHANGE UP (ref 43–77)
NRBC # BLD: 0 /100 WBCS — SIGNIFICANT CHANGE UP
NRBC # FLD: 0.03 K/UL — HIGH
PLATELET # BLD AUTO: 306 K/UL — SIGNIFICANT CHANGE UP (ref 150–400)
POTASSIUM SERPL-MCNC: 3.9 MMOL/L — SIGNIFICANT CHANGE UP (ref 3.5–5.3)
POTASSIUM SERPL-SCNC: 3.9 MMOL/L — SIGNIFICANT CHANGE UP (ref 3.5–5.3)
PROT SERPL-MCNC: 6.7 G/DL — SIGNIFICANT CHANGE UP (ref 6–8.3)
PROTHROM AB SERPL-ACNC: 11.1 SEC — SIGNIFICANT CHANGE UP (ref 10.6–13.6)
RBC # BLD: 3.77 M/UL — LOW (ref 3.8–5.2)
RBC # FLD: 12.8 % — SIGNIFICANT CHANGE UP (ref 10.3–14.5)
RH IG SCN BLD-IMP: POSITIVE — SIGNIFICANT CHANGE UP
SODIUM SERPL-SCNC: 134 MMOL/L — LOW (ref 135–145)
URATE SERPL-MCNC: 4.8 MG/DL — SIGNIFICANT CHANGE UP (ref 2.5–7)
WBC # BLD: 13.85 K/UL — HIGH (ref 3.8–10.5)
WBC # FLD AUTO: 13.85 K/UL — HIGH (ref 3.8–10.5)

## 2021-08-03 RX ADMIN — Medication 90 MILLIGRAM(S): at 14:00

## 2021-08-03 RX ADMIN — HEPARIN SODIUM 5000 UNIT(S): 5000 INJECTION INTRAVENOUS; SUBCUTANEOUS at 22:19

## 2021-08-03 RX ADMIN — Medication 325 MILLIGRAM(S): at 10:18

## 2021-08-03 RX ADMIN — HEPARIN SODIUM 5000 UNIT(S): 5000 INJECTION INTRAVENOUS; SUBCUTANEOUS at 10:17

## 2021-08-03 RX ADMIN — Medication 325 MILLIGRAM(S): at 22:19

## 2021-08-03 RX ADMIN — Medication 500 MILLIGRAM(S): at 10:19

## 2021-08-03 RX ADMIN — Medication 81 MILLIGRAM(S): at 10:25

## 2021-08-03 NOTE — PROGRESS NOTE ADULT - ASSESSMENT
39yo  @ 29w6d (by LMP (21) SAKINA 10/13/21) admitted for siPEC w/severe features vs cHTN exacerbation. Lab abnormalities notable for isolated elevated LFTs, lab work overall wnl. Patient with intermittent NRNST, however FHT improving after discontinued labetalol yesterday. BPs overall well controlled on Procardia 90XL.    #siPEC w/severe features vs cHTN exacerbation  - c/w Procardia XL to 90mg; s/p Labetalol 200 mg TID  - s/p Procardia 10 IR ()  - 24hr urine (): 179  - HELLP labs q3d ()  - monitor BPs, neuro function, strict I&O    #Isolated Elevated LFTs  - RUQ Sono (): contracted gallbladder, no obvious stones  - hepatitis panel (): neg  - continue trending CMP q3d, downtrending at this time    #fetal well being  - BPP 8/8  - NST BID  - : monitored in PACU w/Mg for fetal neuro protection /2 NR NST w/spontaneous decels, BPP 8/8  - S/p BMZ (-)  - MFM (): BPP 8/8, intermittent AEDV  - MFM (): VTX, posterior placenta, LUCERO 20, UAD elevated with intermittent AEDV, BPP 8/8  - MFM (): VTX, posterior placenta, LUCERO 16.6, EFW 941g (2%, AC <3%), S/D 8.78, +AEDV  - GBS positive    #maternal well being  - Reg, SLIV  - COVID neg  - VTE ppx: HSQ, OOB  - MOD: repeat C/S    RFrankel PGY3

## 2021-08-03 NOTE — PROGRESS NOTE ADULT - SUBJECTIVE AND OBJECTIVE BOX
R3 Antepartum Note, HD#11    Interval events: No acute events overnight.     Patient seen and examined at bedside, no acute overnight events. No acute complaints. Pt reports +FM; denies LOF, VB, Ctx, HA, epigastric pain, blurred vision, CP, SOB, N/V, fevers, and chills.    Vital Signs Last 24 Hours  T(C): 36.6 (08-03-21 @ 05:31), Max: 36.9 (08-02-21 @ 14:02)  HR: 90 (08-03-21 @ 05:31) (80 - 94)  BP: 118/69 (08-03-21 @ 05:31) (118/69 - 141/85)  RR: 18 (08-03-21 @ 05:31) (17 - 18)  SpO2: 98% (08-03-21 @ 05:31) (92% - 100%)      Physical Exam:  General: NAD  Abdomen: Soft, non-tender, gravid  Ext: No pain or swelling    NST reactive overnight    Labs:             11.7   13.85 )-----------( 306      ( 08-03 @ 06:39 )             35.4     08-03 @ 06:39    134  |  102  |  12  ----------------------------<  87  3.9   |  19  |  0.65    Ca    9.5      08-03 @ 06:39    TPro  6.7  /  Alb  3.6  /  TBili  <0.2  /  DBili  x   /  AST  17  /  ALT  26  /  AlkPhos  119  08-03 @ 06:39    PT/INR - ( 08-03 @ 06:39 )   PT: 11.1 sec;   INR: 0.96 ratio    PTT - ( 08-03 @ 06:39 )  PTT:40.3 sec    Uric Acid: (08-03 @ 06:39)  4.8      Fibrinogen: (08-03 @ 06:39)  657      LDH: (08-03 @ 06:39)  183        MEDICATIONS  (STANDING):  ascorbic acid 500 milliGRAM(s) Oral daily  aspirin enteric coated 81 milliGRAM(s) Oral daily  ferrous    sulfate 325 milliGRAM(s) Oral two times a day  heparin   Injectable 5000 Unit(s) SubCutaneous every 12 hours  NIFEdipine XL 90 milliGRAM(s) Oral every 24 hours    MEDICATIONS  (PRN):

## 2021-08-04 ENCOUNTER — ASOB RESULT (OUTPATIENT)
Age: 41
End: 2021-08-04

## 2021-08-04 ENCOUNTER — APPOINTMENT (OUTPATIENT)
Dept: ANTEPARTUM | Facility: HOSPITAL | Age: 41
End: 2021-08-04
Payer: COMMERCIAL

## 2021-08-04 DIAGNOSIS — Z3A.00 WEEKS OF GESTATION OF PREGNANCY NOT SPECIFIED: ICD-10-CM

## 2021-08-04 DIAGNOSIS — O26.899 OTHER SPECIFIED PREGNANCY RELATED CONDITIONS, UNSPECIFIED TRIMESTER: ICD-10-CM

## 2021-08-04 PROCEDURE — 76820 UMBILICAL ARTERY ECHO: CPT | Mod: 26

## 2021-08-04 PROCEDURE — 99232 SBSQ HOSP IP/OBS MODERATE 35: CPT

## 2021-08-04 PROCEDURE — 76819 FETAL BIOPHYS PROFIL W/O NST: CPT | Mod: 26

## 2021-08-04 PROCEDURE — 76821 MIDDLE CEREBRAL ARTERY ECHO: CPT | Mod: 26

## 2021-08-04 RX ADMIN — Medication 90 MILLIGRAM(S): at 13:40

## 2021-08-04 RX ADMIN — Medication 500 MILLIGRAM(S): at 10:10

## 2021-08-04 RX ADMIN — HEPARIN SODIUM 5000 UNIT(S): 5000 INJECTION INTRAVENOUS; SUBCUTANEOUS at 22:11

## 2021-08-04 RX ADMIN — Medication 325 MILLIGRAM(S): at 22:12

## 2021-08-04 RX ADMIN — Medication 81 MILLIGRAM(S): at 10:06

## 2021-08-04 RX ADMIN — Medication 325 MILLIGRAM(S): at 10:05

## 2021-08-04 NOTE — PROGRESS NOTE ADULT - ASSESSMENT
39yo  @ 30w (by LMP (21) SAKINA 10/13/21) admitted for siPEC w/severe features vs cHTN exacerbation as well as IUGR (2%) with AEDV. Lab abnormalities notable for isolated elevated LFTs, lab work overall wnl. Patient with intermittent NRNST, however FHT improving after discontinued labetalol. BPs overall well controlled on Procardia 90XL.    #siPEC w/severe features vs cHTN exacerbation  - c/w Procardia XL to 90mg; s/p Labetalol 200 mg TID  - s/p Procardia 10 IR ()  - 24hr urine (): 179  - HELLP labs q3d ()  - monitor BPs, neuro function, strict I&O    #Isolated Elevated LFTs  - RUQ Sono (): contracted gallbladder, no obvious stones  - hepatitis panel (): neg  - continue trending CMP q3d, downtrending at this time    #fetal well being  - 1hr NST TID  - : monitored in PACU w/Mg for fetal neuro protection /2 NR NST w/spontaneous decels, BPP 8/8  - S/p BMZ (-)  - MFM (): BPP 8/8, intermittent AEDV  - MFM (): VTX, posterior placenta, LUCERO 20, UAD elevated with intermittent AEDV, BPP 8/8  - MFM (): VTX, posterior placenta, LUCERO 16.6, EFW 941g (2%, AC <3%), S/D 8.78, +AEDV  - GBS positive    #maternal well being  - Reg, SLIV  - COVID neg  - VTE ppx: HSQ, OOB  - MOD: repeat C/S    RFrankel PGY3

## 2021-08-04 NOTE — PROGRESS NOTE ADULT - SUBJECTIVE AND OBJECTIVE BOX
R3 Antepartum Note, HD#12    Interval events: No acute events overnight.     Patient seen and examined at bedside, no acute overnight events. No acute complaints. Pt reports +FM; denies LOF, VB, Ctx, HA, epigastric pain, blurred vision, CP, SOB, N/V, fevers, and chills.      Vital Signs Last 24 Hours  T(C): 36.9 (08-04-21 @ 10:02), Max: 37.4 (08-03-21 @ 22:07)  HR: 90 (08-04-21 @ 10:02) (89 - 101)  BP: 137/95 (08-04-21 @ 10:02) (123/67 - 150/72)  RR: 17 (08-04-21 @ 10:02) (16 - 18)  SpO2: 99% (08-04-21 @ 10:02) (94% - 99%)        Physical Exam:  General: NAD  Abdomen: Soft, non-tender, gravid  Ext: No pain or swelling    NST reactive overnight    Labs:             11.7   13.85 )-----------( 306      ( 08-03 @ 06:39 )             35.4     08-03 @ 06:39    134  |  102  |  12  ----------------------------<  87  3.9   |  19  |  0.65    Ca    9.5      08-03 @ 06:39    TPro  6.7  /  Alb  3.6  /  TBili  <0.2  /  DBili  x   /  AST  17  /  ALT  26  /  AlkPhos  119  08-03 @ 06:39    PT/INR - ( 08-03 @ 06:39 )   PT: 11.1 sec;   INR: 0.96 ratio    PTT - ( 08-03 @ 06:39 )  PTT:40.3 sec    Uric Acid: (08-03 @ 06:39)  4.8      Fibrinogen: (08-03 @ 06:39)  657      LDH: (08-03 @ 06:39)  183        MEDICATIONS  (STANDING):  ascorbic acid 500 milliGRAM(s) Oral daily  aspirin enteric coated 81 milliGRAM(s) Oral daily  ferrous    sulfate 325 milliGRAM(s) Oral two times a day  heparin   Injectable 5000 Unit(s) SubCutaneous every 12 hours  NIFEdipine XL 90 milliGRAM(s) Oral every 24 hours    MEDICATIONS  (PRN):

## 2021-08-05 RX ADMIN — Medication 90 MILLIGRAM(S): at 14:00

## 2021-08-05 RX ADMIN — Medication 81 MILLIGRAM(S): at 12:53

## 2021-08-05 RX ADMIN — HEPARIN SODIUM 5000 UNIT(S): 5000 INJECTION INTRAVENOUS; SUBCUTANEOUS at 21:46

## 2021-08-05 RX ADMIN — Medication 325 MILLIGRAM(S): at 12:54

## 2021-08-05 RX ADMIN — Medication 325 MILLIGRAM(S): at 21:46

## 2021-08-05 RX ADMIN — Medication 500 MILLIGRAM(S): at 12:54

## 2021-08-05 NOTE — PROGRESS NOTE ADULT - SUBJECTIVE AND OBJECTIVE BOX
R3 Antepartum Note, HD#13    Interval events: no acute events overnight.     Patient seen and examined at bedside, no acute overnight events. No acute complaints. Pt reports +FM, denies LOF, VB, Ctx, HA, epigastric pain, blurred vision, CP, SOB, N/V, fevers, and chills.    Vital Signs Last 24 Hours  T(C): 36.9 (08-05-21 @ 09:40), Max: 37.6 (08-04-21 @ 17:55)  HR: 96 (08-05-21 @ 09:40) (96 - 107)  BP: 133/81 (08-05-21 @ 09:40) (126/76 - 136/80)  RR: 16 (08-05-21 @ 09:40) (16 - 17)  SpO2: 98% (08-05-21 @ 09:40) (96% - 100%)    Physical Exam:  General: NAD  Abdomen: Soft, non-tender, gravid  Ext: No pain or swelling    NST reactive overnight    Labs:      PT/INR - ( 08-03 @ 06:39 )   PT: 11.1 sec;   INR: 0.96 ratio    PTT - ( 08-03 @ 06:39 )  PTT:40.3 sec    Uric Acid: (08-03 @ 06:39)  4.8      Fibrinogen: (08-03 @ 06:39)  657      LDH: (08-03 @ 06:39)  183        MEDICATIONS  (STANDING):  ascorbic acid 500 milliGRAM(s) Oral daily  aspirin enteric coated 81 milliGRAM(s) Oral daily  ferrous    sulfate 325 milliGRAM(s) Oral two times a day  heparin   Injectable 5000 Unit(s) SubCutaneous every 12 hours  NIFEdipine XL 90 milliGRAM(s) Oral every 24 hours    MEDICATIONS  (PRN):

## 2021-08-05 NOTE — PROGRESS NOTE ADULT - ASSESSMENT
41yo  @ 30.1w (by LMP (21) SAKINA 10/13/21) admitted for siPEC w/severe features vs cHTN exacerbation as well as IUGR (2%) with AEDV. Lab abnormalities notable for isolated elevated LFTs, lab work overall wnl. Patient with intermittent NRNST, however FHT improving after discontinued labetalol. BPs overall well controlled on Procardia 90XL.    #siPEC w/severe features vs cHTN exacerbation  - c/w Procardia XL to 90mg; s/p Labetalol 200 mg TID  - s/p Procardia 10 IR ()  - 24hr urine (): 179  - HELLP labs q3d ()  - monitor BPs, neuro function, strict I&O    #Isolated Elevated LFTs  - RUQ Sono (): contracted gallbladder, no obvious stones  - hepatitis panel (): neg  - continue trending CMP q3d, downtrending at this time    #fetal well being  - 1hr NST TID  - : monitored in PACU w/Mg for fetal neuro protection  NR NST w/spontaneous decels, BPP 8/8  - S/p BMZ (-)  - MFM (): BPP 8/8, intermittent AEDV  - MFM (): VTX, posterior placenta, LUCERO 20, UAD elevated with intermittent AEDV, BPP 8/8  - MFM (): VTX, posterior placenta, LUCERO 16.6, EFW 941g (2%, AC <3%), S/D 8.78, +AEDV  - GBS positive    #maternal well being  - Reg, SLIV  - COVID neg  - VTE ppx: HSQ, OOB  - MOD: repeat C/S    RFrankel PGY3

## 2021-08-06 ENCOUNTER — APPOINTMENT (OUTPATIENT)
Dept: ANTEPARTUM | Facility: CLINIC | Age: 41
End: 2021-08-06
Payer: COMMERCIAL

## 2021-08-06 ENCOUNTER — TRANSCRIPTION ENCOUNTER (OUTPATIENT)
Age: 41
End: 2021-08-06

## 2021-08-06 ENCOUNTER — ASOB RESULT (OUTPATIENT)
Age: 41
End: 2021-08-06

## 2021-08-06 LAB
ALBUMIN SERPL ELPH-MCNC: 3.4 G/DL — SIGNIFICANT CHANGE UP (ref 3.3–5)
ALP SERPL-CCNC: 129 U/L — HIGH (ref 40–120)
ALT FLD-CCNC: 29 U/L — SIGNIFICANT CHANGE UP (ref 4–33)
ANION GAP SERPL CALC-SCNC: 13 MMOL/L — SIGNIFICANT CHANGE UP (ref 7–14)
APTT BLD: 31.5 SEC — SIGNIFICANT CHANGE UP (ref 27–36.3)
AST SERPL-CCNC: 21 U/L — SIGNIFICANT CHANGE UP (ref 4–32)
BASOPHILS # BLD AUTO: 0.08 K/UL — SIGNIFICANT CHANGE UP (ref 0–0.2)
BASOPHILS NFR BLD AUTO: 0.6 % — SIGNIFICANT CHANGE UP (ref 0–2)
BILIRUB SERPL-MCNC: 0.2 MG/DL — SIGNIFICANT CHANGE UP (ref 0.2–1.2)
BLD GP AB SCN SERPL QL: NEGATIVE — SIGNIFICANT CHANGE UP
BUN SERPL-MCNC: 12 MG/DL — SIGNIFICANT CHANGE UP (ref 7–23)
CALCIUM SERPL-MCNC: 9.4 MG/DL — SIGNIFICANT CHANGE UP (ref 8.4–10.5)
CHLORIDE SERPL-SCNC: 103 MMOL/L — SIGNIFICANT CHANGE UP (ref 98–107)
CO2 SERPL-SCNC: 18 MMOL/L — LOW (ref 22–31)
CREAT SERPL-MCNC: 0.68 MG/DL — SIGNIFICANT CHANGE UP (ref 0.5–1.3)
EOSINOPHIL # BLD AUTO: 0.17 K/UL — SIGNIFICANT CHANGE UP (ref 0–0.5)
EOSINOPHIL NFR BLD AUTO: 1.3 % — SIGNIFICANT CHANGE UP (ref 0–6)
FIBRINOGEN PPP-MCNC: 690 MG/DL — HIGH (ref 290–520)
GLUCOSE SERPL-MCNC: 88 MG/DL — SIGNIFICANT CHANGE UP (ref 70–99)
HCT VFR BLD CALC: 34.8 % — SIGNIFICANT CHANGE UP (ref 34.5–45)
HGB BLD-MCNC: 11.7 G/DL — SIGNIFICANT CHANGE UP (ref 11.5–15.5)
IANC: 8.32 K/UL — SIGNIFICANT CHANGE UP (ref 1.5–8.5)
IMM GRANULOCYTES NFR BLD AUTO: 1.7 % — HIGH (ref 0–1.5)
INR BLD: 0.94 RATIO — SIGNIFICANT CHANGE UP (ref 0.88–1.16)
LDH SERPL L TO P-CCNC: 185 U/L — SIGNIFICANT CHANGE UP (ref 135–225)
LYMPHOCYTES # BLD AUTO: 25.3 % — SIGNIFICANT CHANGE UP (ref 13–44)
LYMPHOCYTES # BLD AUTO: 3.41 K/UL — HIGH (ref 1–3.3)
MCHC RBC-ENTMCNC: 31 PG — SIGNIFICANT CHANGE UP (ref 27–34)
MCHC RBC-ENTMCNC: 33.6 GM/DL — SIGNIFICANT CHANGE UP (ref 32–36)
MCV RBC AUTO: 92.3 FL — SIGNIFICANT CHANGE UP (ref 80–100)
MONOCYTES # BLD AUTO: 1.25 K/UL — HIGH (ref 0–0.9)
MONOCYTES NFR BLD AUTO: 9.3 % — SIGNIFICANT CHANGE UP (ref 2–14)
NEUTROPHILS # BLD AUTO: 8.32 K/UL — HIGH (ref 1.8–7.4)
NEUTROPHILS NFR BLD AUTO: 61.8 % — SIGNIFICANT CHANGE UP (ref 43–77)
NRBC # BLD: 0 /100 WBCS — SIGNIFICANT CHANGE UP
NRBC # FLD: 0.02 K/UL — HIGH
PLATELET # BLD AUTO: 275 K/UL — SIGNIFICANT CHANGE UP (ref 150–400)
POTASSIUM SERPL-MCNC: 4.1 MMOL/L — SIGNIFICANT CHANGE UP (ref 3.5–5.3)
POTASSIUM SERPL-SCNC: 4.1 MMOL/L — SIGNIFICANT CHANGE UP (ref 3.5–5.3)
PROT SERPL-MCNC: 6.3 G/DL — SIGNIFICANT CHANGE UP (ref 6–8.3)
PROTHROM AB SERPL-ACNC: 10.7 SEC — SIGNIFICANT CHANGE UP (ref 10.6–13.6)
RBC # BLD: 3.77 M/UL — LOW (ref 3.8–5.2)
RBC # FLD: 13 % — SIGNIFICANT CHANGE UP (ref 10.3–14.5)
RH IG SCN BLD-IMP: POSITIVE — SIGNIFICANT CHANGE UP
SODIUM SERPL-SCNC: 134 MMOL/L — LOW (ref 135–145)
URATE SERPL-MCNC: 5.2 MG/DL — SIGNIFICANT CHANGE UP (ref 2.5–7)
WBC # BLD: 13.46 K/UL — HIGH (ref 3.8–10.5)
WBC # FLD AUTO: 13.46 K/UL — HIGH (ref 3.8–10.5)

## 2021-08-06 PROCEDURE — 76821 MIDDLE CEREBRAL ARTERY ECHO: CPT | Mod: 26

## 2021-08-06 PROCEDURE — 76816 OB US FOLLOW-UP PER FETUS: CPT | Mod: 26

## 2021-08-06 PROCEDURE — 99232 SBSQ HOSP IP/OBS MODERATE 35: CPT

## 2021-08-06 PROCEDURE — 76819 FETAL BIOPHYS PROFIL W/O NST: CPT | Mod: 26

## 2021-08-06 PROCEDURE — 76820 UMBILICAL ARTERY ECHO: CPT | Mod: 26

## 2021-08-06 RX ADMIN — Medication 12 MILLIGRAM(S): at 21:43

## 2021-08-06 RX ADMIN — Medication 500 MILLIGRAM(S): at 10:40

## 2021-08-06 RX ADMIN — HEPARIN SODIUM 5000 UNIT(S): 5000 INJECTION INTRAVENOUS; SUBCUTANEOUS at 10:40

## 2021-08-06 RX ADMIN — Medication 325 MILLIGRAM(S): at 21:43

## 2021-08-06 RX ADMIN — Medication 81 MILLIGRAM(S): at 10:39

## 2021-08-06 RX ADMIN — Medication 90 MILLIGRAM(S): at 14:32

## 2021-08-06 RX ADMIN — Medication 325 MILLIGRAM(S): at 10:40

## 2021-08-06 NOTE — PROGRESS NOTE ADULT - SUBJECTIVE AND OBJECTIVE BOX
R3 Antepartum Note, HD#14    Interval events: no acute events overnight.     Patient seen and examined at bedside, no acute overnight events. No acute complaints. Pt reports +FM, denies LOF, VB, Ctx, HA, epigastric pain, blurred vision, CP, SOB, N/V, fevers, and chills.    Vital Signs Last 24 Hours  T(C): 37.1 (08-06-21 @ 09:48), Max: 37.3 (08-05-21 @ 21:49)  HR: 92 (08-06-21 @ 09:48) (82 - 102)  BP: 123/84 (08-06-21 @ 09:48) (121/77 - 134/88)  RR: 16 (08-06-21 @ 09:48) (16 - 18)  SpO2: 99% (08-06-21 @ 09:48) (95% - 100%)        Physical Exam:  General: NAD  Abdomen: Soft, non-tender, gravid  Ext: No pain or swelling    NST reactive overnight    Labs:             11.7   13.46 )-----------( 275      ( 08-06 @ 06:03 )             34.8     08-06 @ 06:03    134  |  103  |  12  ----------------------------<  88  4.1   |  18  |  0.68    Ca    9.4      08-06 @ 06:03    TPro  6.3  /  Alb  3.4  /  TBili  0.2  /  DBili  x   /  AST  21  /  ALT  29  /  AlkPhos  129  08-06 @ 06:03    PT/INR - ( 08-06 @ 06:03 )   PT: 10.7 sec;   INR: 0.94 ratio    PTT - ( 08-06 @ 06:03 )  PTT:31.5 sec    Uric Acid: (08-06 @ 06:03)  5.2      Fibrinogen: (08-06 @ 06:03)  690      LDH: (08-06 @ 06:03)  185        MEDICATIONS  (STANDING):  ascorbic acid 500 milliGRAM(s) Oral daily  aspirin enteric coated 81 milliGRAM(s) Oral daily  ferrous    sulfate 325 milliGRAM(s) Oral two times a day  heparin   Injectable 5000 Unit(s) SubCutaneous every 12 hours  NIFEdipine XL 90 milliGRAM(s) Oral every 24 hours    MEDICATIONS  (PRN):

## 2021-08-06 NOTE — PROGRESS NOTE ADULT - ASSESSMENT
39yo  @ 30.2w (by LMP (21) SAKINA 10/13/21) admitted for siPEC w/severe features vs cHTN exacerbation as well as IUGR (2%) with AEDV. Lab abnormalities notable for isolated elevated LFTs, lab work overall wnl. Patient with intermittent NRNST, however FHT improving after discontinued labetalol. BPs overall well controlled on Procardia 90XL and fetal status reassuring.    #siPEC w/severe features vs cHTN exacerbation  - c/w Procardia XL to 90mg; s/p Labetalol 200 mg TID  - s/p Procardia 10 IR ()  - 24hr urine (): 179  - HELLP labs q3d ()  - monitor BPs, neuro function, strict I&O    #Isolated Elevated LFTs  - RUQ Sono (): contracted gallbladder, no obvious stones  - hepatitis panel (): neg  - continue trending CMP q3d, downtrending at this time    #fetal well being  - NST BID  -for ATU scan today  - : monitored in PACU w/Mg for fetal neuro protection  NR NST w/spontaneous decels, BPP 8/8  - S/p BMZ (-)  - MFM (): BPP 8/8, intermittent AEDV  - MFM (): VTX, posterior placenta, LUCERO 20, UAD elevated with intermittent AEDV, BPP 8/8  - MFM (): VTX, posterior placenta, LUCERO 16.6, EFW 941g (2%, AC <3%), S/D 8.78, +AEDV  - GBS positive ()    #maternal well being  - Reg, SLIV  - COVID neg  - VTE ppx: HSQ, OOB  - MOD: repeat C/S    R[place]el PGY3

## 2021-08-06 NOTE — PROGRESS NOTE ADULT - SUBJECTIVE AND OBJECTIVE BOX
MFM Note    Ms. Goodwin was seen and examined at bedside. She is feeling overall well and without complaints. She denies any contractions, vaginal bleeding, loss of fluid or decreased fetal movement. No fevers, chills, chest pain, shortness of breath, dizziness, lightheadedness, nausea, vomiting or diarrhea. No headache, vision changes, right upper quadrant pain.     Hypertension      T(C): 37.2 (08-06-21 @ 14:11), Max: 37.3 (08-05-21 @ 21:49)  HR: 88 (08-06-21 @ 14:11) (82 - 102)  BP: 141/84 (08-06-21 @ 14:11) (121/77 - 141/84)  RR: 18 (08-06-21 @ 14:11) (16 - 18)  SpO2: 100% (08-06-21 @ 14:11) (95% - 100%)    08-05-21 @ 07:01  -  08-06-21 @ 07:00  --------------------------------------------------------  IN: 0 mL / OUT: 575 mL / NET: -575 mL    08-06-21 @ 07:01  -  08-06-21 @ 16:42  --------------------------------------------------------  IN: 0 mL / OUT: 925 mL / NET: -925 mL      Gen: NAD  Resp: Unlabored  Abd: Soft, gravid, nontender to palpation  FHR: 145 bl, mod desirae, + accel. -toco  [place]: None  MFM Sono: see report      ascorbic acid 500 milliGRAM(s) Oral daily  aspirin enteric coated 81 milliGRAM(s) Oral daily  ferrous    sulfate 325 milliGRAM(s) Oral two times a day  heparin   Injectable 5000 Unit(s) SubCutaneous every 12 hours  NIFEdipine XL 90 milliGRAM(s) Oral every 24 hours

## 2021-08-06 NOTE — PROGRESS NOTE ADULT - ASSESSMENT
41yo  @ 30.2w admitted for chronic hypertension with superimposed preelampsia with severe features, intrauterine growth restriction (2nd %tile) with AEDV, clinically stable at this time    #siPEC w/severe features  - c/w Procardia XL to 90mg  Normotensive    #fetal well being  - 1hr NST TID  - UAD today remarkable for intermittent reverse end diastolic flow. Normal ductus venosus. MCA dopplers demonstrating brain-sparing pattern.     Patient counseled on new finding of umbilical artery dopplers. Reviewed that patient may be delivered soon. Discussed importance of close monitoring of fetal status.     Will give rescue betamethasone tonight.  Low threshold for delivery. Patient counseled with Dr. Galvan.     Tenisha Cardenas MD  Fellow, Maternal Fetal Medicine

## 2021-08-07 ENCOUNTER — RESULT REVIEW (OUTPATIENT)
Age: 41
End: 2021-08-07

## 2021-08-07 ENCOUNTER — RX RENEWAL (OUTPATIENT)
Age: 41
End: 2021-08-07

## 2021-08-07 ENCOUNTER — TRANSCRIPTION ENCOUNTER (OUTPATIENT)
Age: 41
End: 2021-08-07

## 2021-08-07 LAB
ALBUMIN SERPL ELPH-MCNC: 3.4 G/DL — SIGNIFICANT CHANGE UP (ref 3.3–5)
ALBUMIN SERPL ELPH-MCNC: 4.1 G/DL — SIGNIFICANT CHANGE UP (ref 3.3–5)
ALP SERPL-CCNC: 116 U/L — SIGNIFICANT CHANGE UP (ref 40–120)
ALP SERPL-CCNC: 152 U/L — HIGH (ref 40–120)
ALT FLD-CCNC: 41 U/L — HIGH (ref 4–33)
ALT FLD-CCNC: 51 U/L — HIGH (ref 4–33)
ANION GAP SERPL CALC-SCNC: 16 MMOL/L — HIGH (ref 7–14)
ANION GAP SERPL CALC-SCNC: 19 MMOL/L — HIGH (ref 7–14)
APTT BLD: 23.5 SEC — LOW (ref 27–36.3)
APTT BLD: 29.9 SEC — SIGNIFICANT CHANGE UP (ref 27–36.3)
AST SERPL-CCNC: 34 U/L — HIGH (ref 4–32)
AST SERPL-CCNC: 46 U/L — HIGH (ref 4–32)
BASOPHILS # BLD AUTO: 0.06 K/UL — SIGNIFICANT CHANGE UP (ref 0–0.2)
BASOPHILS NFR BLD AUTO: 0.3 % — SIGNIFICANT CHANGE UP (ref 0–2)
BILIRUB SERPL-MCNC: 0.2 MG/DL — SIGNIFICANT CHANGE UP (ref 0.2–1.2)
BILIRUB SERPL-MCNC: <0.2 MG/DL — SIGNIFICANT CHANGE UP (ref 0.2–1.2)
BUN SERPL-MCNC: 12 MG/DL — SIGNIFICANT CHANGE UP (ref 7–23)
BUN SERPL-MCNC: 14 MG/DL — SIGNIFICANT CHANGE UP (ref 7–23)
CALCIUM SERPL-MCNC: 10 MG/DL — SIGNIFICANT CHANGE UP (ref 8.4–10.5)
CALCIUM SERPL-MCNC: 8.6 MG/DL — SIGNIFICANT CHANGE UP (ref 8.4–10.5)
CHLORIDE SERPL-SCNC: 102 MMOL/L — SIGNIFICANT CHANGE UP (ref 98–107)
CHLORIDE SERPL-SCNC: 97 MMOL/L — LOW (ref 98–107)
CO2 SERPL-SCNC: 16 MMOL/L — LOW (ref 22–31)
CO2 SERPL-SCNC: 18 MMOL/L — LOW (ref 22–31)
CREAT SERPL-MCNC: 0.7 MG/DL — SIGNIFICANT CHANGE UP (ref 0.5–1.3)
CREAT SERPL-MCNC: 0.74 MG/DL — SIGNIFICANT CHANGE UP (ref 0.5–1.3)
EOSINOPHIL # BLD AUTO: 0 K/UL — SIGNIFICANT CHANGE UP (ref 0–0.5)
EOSINOPHIL NFR BLD AUTO: 0 % — SIGNIFICANT CHANGE UP (ref 0–6)
FIBRINOGEN PPP-MCNC: 787 MG/DL — HIGH (ref 290–520)
GLUCOSE SERPL-MCNC: 105 MG/DL — HIGH (ref 70–99)
GLUCOSE SERPL-MCNC: 146 MG/DL — HIGH (ref 70–99)
HCT VFR BLD CALC: 31.5 % — LOW (ref 34.5–45)
HCT VFR BLD CALC: 38.2 % — SIGNIFICANT CHANGE UP (ref 34.5–45)
HGB BLD-MCNC: 10.2 G/DL — LOW (ref 11.5–15.5)
HGB BLD-MCNC: 12.9 G/DL — SIGNIFICANT CHANGE UP (ref 11.5–15.5)
IANC: 15.68 K/UL — HIGH (ref 1.5–8.5)
IANC: 23.97 K/UL — HIGH (ref 1.5–8.5)
IMM GRANULOCYTES NFR BLD AUTO: 1.8 % — HIGH (ref 0–1.5)
INR BLD: 0.91 RATIO — SIGNIFICANT CHANGE UP (ref 0.88–1.16)
INR BLD: 1.02 RATIO — SIGNIFICANT CHANGE UP (ref 0.88–1.16)
LDH SERPL L TO P-CCNC: 255 U/L — HIGH (ref 135–225)
LYMPHOCYTES # BLD AUTO: 13 % — SIGNIFICANT CHANGE UP (ref 13–44)
LYMPHOCYTES # BLD AUTO: 2.49 K/UL — SIGNIFICANT CHANGE UP (ref 1–3.3)
MAGNESIUM SERPL-MCNC: 6 MG/DL — HIGH (ref 1.6–2.6)
MCHC RBC-ENTMCNC: 30.9 PG — SIGNIFICANT CHANGE UP (ref 27–34)
MCHC RBC-ENTMCNC: 30.9 PG — SIGNIFICANT CHANGE UP (ref 27–34)
MCHC RBC-ENTMCNC: 32.4 GM/DL — SIGNIFICANT CHANGE UP (ref 32–36)
MCHC RBC-ENTMCNC: 33.8 GM/DL — SIGNIFICANT CHANGE UP (ref 32–36)
MCV RBC AUTO: 91.6 FL — SIGNIFICANT CHANGE UP (ref 80–100)
MCV RBC AUTO: 95.5 FL — SIGNIFICANT CHANGE UP (ref 80–100)
MONOCYTES # BLD AUTO: 0.61 K/UL — SIGNIFICANT CHANGE UP (ref 0–0.9)
MONOCYTES NFR BLD AUTO: 3.2 % — SIGNIFICANT CHANGE UP (ref 2–14)
NEUTROPHILS # BLD AUTO: 15.68 K/UL — HIGH (ref 1.8–7.4)
NEUTROPHILS NFR BLD AUTO: 81.7 % — HIGH (ref 43–77)
NRBC # BLD: 0 /100 WBCS — SIGNIFICANT CHANGE UP
NRBC # FLD: 0.02 K/UL — HIGH
PLATELET # BLD AUTO: 255 K/UL — SIGNIFICANT CHANGE UP (ref 150–400)
PLATELET # BLD AUTO: 309 K/UL — SIGNIFICANT CHANGE UP (ref 150–400)
POTASSIUM SERPL-MCNC: 4.2 MMOL/L — SIGNIFICANT CHANGE UP (ref 3.5–5.3)
POTASSIUM SERPL-MCNC: 4.3 MMOL/L — SIGNIFICANT CHANGE UP (ref 3.5–5.3)
POTASSIUM SERPL-SCNC: 4.2 MMOL/L — SIGNIFICANT CHANGE UP (ref 3.5–5.3)
POTASSIUM SERPL-SCNC: 4.3 MMOL/L — SIGNIFICANT CHANGE UP (ref 3.5–5.3)
PROT SERPL-MCNC: 6.3 G/DL — SIGNIFICANT CHANGE UP (ref 6–8.3)
PROT SERPL-MCNC: 7.6 G/DL — SIGNIFICANT CHANGE UP (ref 6–8.3)
PROTHROM AB SERPL-ACNC: 10.5 SEC — LOW (ref 10.6–13.6)
PROTHROM AB SERPL-ACNC: 11.6 SEC — SIGNIFICANT CHANGE UP (ref 10.6–13.6)
RBC # BLD: 3.3 M/UL — LOW (ref 3.8–5.2)
RBC # BLD: 4.17 M/UL — SIGNIFICANT CHANGE UP (ref 3.8–5.2)
RBC # FLD: 12.8 % — SIGNIFICANT CHANGE UP (ref 10.3–14.5)
RBC # FLD: 13.2 % — SIGNIFICANT CHANGE UP (ref 10.3–14.5)
SODIUM SERPL-SCNC: 132 MMOL/L — LOW (ref 135–145)
SODIUM SERPL-SCNC: 136 MMOL/L — SIGNIFICANT CHANGE UP (ref 135–145)
URATE SERPL-MCNC: 5.3 MG/DL — SIGNIFICANT CHANGE UP (ref 2.5–7)
WBC # BLD: 19.19 K/UL — HIGH (ref 3.8–10.5)
WBC # BLD: 28.98 K/UL — HIGH (ref 3.8–10.5)
WBC # FLD AUTO: 19.19 K/UL — HIGH (ref 3.8–10.5)
WBC # FLD AUTO: 28.98 K/UL — HIGH (ref 3.8–10.5)

## 2021-08-07 PROCEDURE — 59510 CESAREAN DELIVERY: CPT | Mod: U7,UB,GC

## 2021-08-07 PROCEDURE — 99232 SBSQ HOSP IP/OBS MODERATE 35: CPT | Mod: GC

## 2021-08-07 PROCEDURE — 44005 FREEING OF BOWEL ADHESION: CPT

## 2021-08-07 PROCEDURE — 88307 TISSUE EXAM BY PATHOLOGIST: CPT | Mod: 26

## 2021-08-07 RX ORDER — NIFEDIPINE 30 MG
1 TABLET, EXTENDED RELEASE 24 HR ORAL
Qty: 0 | Refills: 0 | DISCHARGE

## 2021-08-07 RX ORDER — NIFEDIPINE 30 MG
90 TABLET, EXTENDED RELEASE 24 HR ORAL DAILY
Refills: 0 | Status: DISCONTINUED | OUTPATIENT
Start: 2021-08-07 | End: 2021-08-07

## 2021-08-07 RX ORDER — SIMETHICONE 80 MG/1
80 TABLET, CHEWABLE ORAL EVERY 4 HOURS
Refills: 0 | Status: DISCONTINUED | OUTPATIENT
Start: 2021-08-07 | End: 2021-08-14

## 2021-08-07 RX ORDER — LABETALOL HCL 100 MG
20 TABLET ORAL ONCE
Refills: 0 | Status: COMPLETED | OUTPATIENT
Start: 2021-08-07 | End: 2021-08-07

## 2021-08-07 RX ORDER — OXYCODONE HYDROCHLORIDE 5 MG/1
5 TABLET ORAL ONCE
Refills: 0 | Status: DISCONTINUED | OUTPATIENT
Start: 2021-08-07 | End: 2021-08-14

## 2021-08-07 RX ORDER — CITRIC ACID/SODIUM CITRATE 300-500 MG
30 SOLUTION, ORAL ORAL ONCE
Refills: 0 | Status: COMPLETED | OUTPATIENT
Start: 2021-08-07 | End: 2021-08-07

## 2021-08-07 RX ORDER — TETANUS TOXOID, REDUCED DIPHTHERIA TOXOID AND ACELLULAR PERTUSSIS VACCINE, ADSORBED 5; 2.5; 8; 8; 2.5 [IU]/.5ML; [IU]/.5ML; UG/.5ML; UG/.5ML; UG/.5ML
0.5 SUSPENSION INTRAMUSCULAR ONCE
Refills: 0 | Status: DISCONTINUED | OUTPATIENT
Start: 2021-08-07 | End: 2021-08-14

## 2021-08-07 RX ORDER — MAGNESIUM HYDROXIDE 400 MG/1
30 TABLET, CHEWABLE ORAL
Refills: 0 | Status: DISCONTINUED | OUTPATIENT
Start: 2021-08-07 | End: 2021-08-14

## 2021-08-07 RX ORDER — HEPARIN SODIUM 5000 [USP'U]/ML
5000 INJECTION INTRAVENOUS; SUBCUTANEOUS EVERY 12 HOURS
Refills: 0 | Status: DISCONTINUED | OUTPATIENT
Start: 2021-08-07 | End: 2021-08-14

## 2021-08-07 RX ORDER — ACETAMINOPHEN 500 MG
975 TABLET ORAL
Refills: 0 | Status: DISCONTINUED | OUTPATIENT
Start: 2021-08-07 | End: 2021-08-14

## 2021-08-07 RX ORDER — OXYCODONE HYDROCHLORIDE 5 MG/1
5 TABLET ORAL
Refills: 0 | Status: DISCONTINUED | OUTPATIENT
Start: 2021-08-07 | End: 2021-08-14

## 2021-08-07 RX ORDER — FAMOTIDINE 10 MG/ML
20 INJECTION INTRAVENOUS ONCE
Refills: 0 | Status: COMPLETED | OUTPATIENT
Start: 2021-08-07 | End: 2021-08-07

## 2021-08-07 RX ORDER — LABETALOL HCL 100 MG
200 TABLET ORAL THREE TIMES A DAY
Refills: 0 | Status: DISCONTINUED | OUTPATIENT
Start: 2021-08-07 | End: 2021-08-07

## 2021-08-07 RX ORDER — LANOLIN
1 OINTMENT (GRAM) TOPICAL EVERY 6 HOURS
Refills: 0 | Status: DISCONTINUED | OUTPATIENT
Start: 2021-08-07 | End: 2021-08-14

## 2021-08-07 RX ORDER — DIPHENHYDRAMINE HCL 50 MG
25 CAPSULE ORAL EVERY 6 HOURS
Refills: 0 | Status: DISCONTINUED | OUTPATIENT
Start: 2021-08-07 | End: 2021-08-14

## 2021-08-07 RX ORDER — LABETALOL HCL 100 MG
1 TABLET ORAL
Qty: 0 | Refills: 0 | DISCHARGE

## 2021-08-07 RX ORDER — IBUPROFEN 200 MG
600 TABLET ORAL EVERY 6 HOURS
Refills: 0 | Status: COMPLETED | OUTPATIENT
Start: 2021-08-07 | End: 2022-07-06

## 2021-08-07 RX ORDER — LABETALOL HCL 100 MG
40 TABLET ORAL ONCE
Refills: 0 | Status: COMPLETED | OUTPATIENT
Start: 2021-08-07 | End: 2021-08-07

## 2021-08-07 RX ORDER — MAGNESIUM SULFATE 500 MG/ML
2 VIAL (ML) INJECTION
Qty: 40 | Refills: 0 | Status: DISCONTINUED | OUTPATIENT
Start: 2021-08-07 | End: 2021-08-11

## 2021-08-07 RX ORDER — LABETALOL HCL 100 MG
200 TABLET ORAL THREE TIMES A DAY
Refills: 0 | Status: DISCONTINUED | OUTPATIENT
Start: 2021-08-07 | End: 2021-08-09

## 2021-08-07 RX ORDER — NALBUPHINE HYDROCHLORIDE 10 MG/ML
2.5 INJECTION, SOLUTION INTRAMUSCULAR; INTRAVENOUS; SUBCUTANEOUS EVERY 6 HOURS
Refills: 0 | Status: DISCONTINUED | OUTPATIENT
Start: 2021-08-07 | End: 2021-08-14

## 2021-08-07 RX ORDER — OXYCODONE HYDROCHLORIDE 5 MG/1
10 TABLET ORAL
Refills: 0 | Status: DISCONTINUED | OUTPATIENT
Start: 2021-08-07 | End: 2021-08-07

## 2021-08-07 RX ORDER — DEXAMETHASONE 0.5 MG/5ML
4 ELIXIR ORAL EVERY 6 HOURS
Refills: 0 | Status: DISCONTINUED | OUTPATIENT
Start: 2021-08-07 | End: 2021-08-14

## 2021-08-07 RX ORDER — ASPIRIN/CALCIUM CARB/MAGNESIUM 324 MG
0 TABLET ORAL
Qty: 0 | Refills: 0 | DISCHARGE

## 2021-08-07 RX ORDER — KETOROLAC TROMETHAMINE 30 MG/ML
30 SYRINGE (ML) INJECTION EVERY 6 HOURS
Refills: 0 | Status: DISCONTINUED | OUTPATIENT
Start: 2021-08-07 | End: 2021-08-09

## 2021-08-07 RX ORDER — NIFEDIPINE 30 MG
90 TABLET, EXTENDED RELEASE 24 HR ORAL DAILY
Refills: 0 | Status: DISCONTINUED | OUTPATIENT
Start: 2021-08-07 | End: 2021-08-14

## 2021-08-07 RX ORDER — SODIUM CHLORIDE 9 MG/ML
1000 INJECTION, SOLUTION INTRAVENOUS
Refills: 0 | Status: DISCONTINUED | OUTPATIENT
Start: 2021-08-07 | End: 2021-08-09

## 2021-08-07 RX ORDER — MAGNESIUM SULFATE 500 MG/ML
4 VIAL (ML) INJECTION ONCE
Refills: 0 | Status: COMPLETED | OUTPATIENT
Start: 2021-08-07 | End: 2021-08-07

## 2021-08-07 RX ORDER — NALOXONE HYDROCHLORIDE 4 MG/.1ML
0.1 SPRAY NASAL
Refills: 0 | Status: DISCONTINUED | OUTPATIENT
Start: 2021-08-07 | End: 2021-08-14

## 2021-08-07 RX ORDER — IBUPROFEN 200 MG
1 TABLET ORAL
Qty: 0 | Refills: 0 | DISCHARGE
Start: 2021-08-07

## 2021-08-07 RX ORDER — OXYCODONE HYDROCHLORIDE 5 MG/1
5 TABLET ORAL
Refills: 0 | Status: DISCONTINUED | OUTPATIENT
Start: 2021-08-07 | End: 2021-08-07

## 2021-08-07 RX ORDER — ACETAMINOPHEN 500 MG
3 TABLET ORAL
Qty: 0 | Refills: 0 | DISCHARGE
Start: 2021-08-07

## 2021-08-07 RX ORDER — ONDANSETRON 8 MG/1
4 TABLET, FILM COATED ORAL EVERY 6 HOURS
Refills: 0 | Status: DISCONTINUED | OUTPATIENT
Start: 2021-08-07 | End: 2021-08-14

## 2021-08-07 RX ORDER — OXYTOCIN 10 UNIT/ML
VIAL (ML) INJECTION
Qty: 20 | Refills: 0 | Status: DISCONTINUED | OUTPATIENT
Start: 2021-08-07 | End: 2021-08-09

## 2021-08-07 RX ADMIN — Medication 30 MILLILITER(S): at 10:48

## 2021-08-07 RX ADMIN — Medication 20 MILLIGRAM(S): at 10:38

## 2021-08-07 RX ADMIN — Medication 300 GRAM(S): at 10:18

## 2021-08-07 RX ADMIN — Medication 1000 MILLIUNIT(S)/MIN: at 15:50

## 2021-08-07 RX ADMIN — Medication 50 GM/HR: at 19:17

## 2021-08-07 RX ADMIN — Medication 30 MILLIGRAM(S): at 17:33

## 2021-08-07 RX ADMIN — Medication 20 MILLIGRAM(S): at 15:51

## 2021-08-07 RX ADMIN — Medication 90 MILLIGRAM(S): at 17:34

## 2021-08-07 RX ADMIN — Medication 40 MILLIGRAM(S): at 16:10

## 2021-08-07 RX ADMIN — Medication 200 MILLIGRAM(S): at 21:44

## 2021-08-07 RX ADMIN — HEPARIN SODIUM 5000 UNIT(S): 5000 INJECTION INTRAVENOUS; SUBCUTANEOUS at 21:44

## 2021-08-07 RX ADMIN — FAMOTIDINE 20 MILLIGRAM(S): 10 INJECTION INTRAVENOUS at 10:47

## 2021-08-07 NOTE — OB PROVIDER DELIVERY SUMMARY - NSSELHIDDEN_OBGYN_ALL_OB_FT
[NS_DeliveryAttending1_OBGYN_ALL_OB_FT:[id number]],[NS_DeliveryAttending2_OBGYN_ALL_OB_FT:[id number]],[NS_DeliveryAssist1_OBGYN_ALL_OB_FT:[id number]],[NS_DeliveryRN_OBGYN_ALL_OB_FT:[id number]]

## 2021-08-07 NOTE — OB PROVIDER DELIVERY SUMMARY - NSPROVIDERDELIVERYNOTE_OBGYN_ALL_OB_FT
Procedure: rCS (classical)  Preop Dx: (1) IUP @ 30+3 (2) IUGR with intermittent REDV (3) Cat II FHT (4) cHTN with siPEC  QBL: 1007mL  IVF:  3L crystalloids  UOP: 750ml  Layers of uterine closure: 2   Complications: indicated serosal lysis, general surgery consult  Specimen: placenta   Findings: dense adhesions between small bowel serosa, peritoneum, and fascia - lysis performed. Serosal repair performed by general surgery (Dr. Mckeon, see separate brief op note)  Hemostatic/Intraoperative agents: Ranjan,     Baby: Male, Cephalic    Lori Gillis MD  OBGYN PGY3

## 2021-08-07 NOTE — DISCHARGE NOTE OB - MEDICATION SUMMARY - MEDICATIONS TO STOP TAKING
I will STOP taking the medications listed below when I get home from the hospital:    NIFEdipine 60 mg oral tablet, extended release  -- 1 tab(s) by mouth once a day  last dose 11am    Low Dose ASA 81 mg oral tablet  -- orally once a day (at bedtime)   I will STOP taking the medications listed below when I get home from the hospital:    NIFEdipine 60 mg oral tablet, extended release  -- 1 tab(s) by mouth once a day  last dose 11am    Low Dose ASA 81 mg oral tablet  -- orally once a day (at bedtime)    labetalol 200 mg oral tablet  -- 1 tab(s) by mouth 3 times a day

## 2021-08-07 NOTE — CHART NOTE - NSCHARTNOTEFT_GEN_A_CORE
OB Attg  Patient evaluated and found to have REDV. Now presenting with cat 3 FHT.   Minimal variability with spontaneous decelerations. Rescue dose steroids administered   last night. Discussed with patient urgent need for delivery. Plan for magnesium bolus for neuroprotection  Patient will be brought to OB PACU for rpt C/s  Wen Browne MD

## 2021-08-07 NOTE — PROGRESS NOTE ADULT - SUBJECTIVE AND OBJECTIVE BOX
R3 Antepartum Note, HD#15    Gestational AGE: 30+3    Interval events: Patient seen and examined at bedside, no acute overnight events. No acute complaints. Pt reports +FM, denies LOF, VB, ctx, HA, epigastric pain, blurred vision, CP, SOB, N/V, fevers, and chills.    UAD yesteday remarkable for intermittent reverse end diastolic flow. Normal ductus venosus. MCA dopplers demonstrating brain-sparing pattern.  Patient counseled on new finding of umbilical artery dopplers. Reviewed that patient may be delivered soon. Discussed importance of close monitoring of fetal status. Pt given first dose of rescue steroids last night at 9:45pm.     Vital Signs Last 24 Hours  T(C): 36.8 (08-07-21 @ 01:50), Max: 37.2 (08-06-21 @ 14:11)  HR: 97 (08-07-21 @ 01:50) (82 - 97)  BP: 135/84 (08-07-21 @ 01:50) (121/77 - 153/88)  RR: 17 (08-07-21 @ 01:50) (16 - 18)  SpO2: 98% (08-07-21 @ 01:50) (95% - 100%)    CAPILLARY BLOOD GLUCOSE          Physical Exam:  General: NAD  Abdomen: Soft, non-tender, gravid  Ext: No pain or swelling    NST reactive overnight    Labs:             11.7   13.46 )-----------( 275      ( 08-06 @ 06:03 )             34.8     08-06 @ 06:03    134  |  103  |  12  ----------------------------<  88  4.1   |  18  |  0.68    Ca    9.4      08-06 @ 06:03    TPro  6.3  /  Alb  3.4  /  TBili  0.2  /  DBili  x   /  AST  21  /  ALT  29  /  AlkPhos  129  08-06 @ 06:03    PT/INR - ( 08-06 @ 06:03 )   PT: 10.7 sec;   INR: 0.94 ratio    PTT - ( 08-06 @ 06:03 )  PTT:31.5 sec    Uric Acid: (08-06 @ 06:03)  5.2      Fibrinogen: (08-06 @ 06:03)  690      LDH: (08-06 @ 06:03)  185        MEDICATIONS  (STANDING):  ascorbic acid 500 milliGRAM(s) Oral daily  aspirin enteric coated 81 milliGRAM(s) Oral daily  betamethasone Injectable 12 milliGRAM(s) IntraMuscular every 24 hours  ferrous    sulfate 325 milliGRAM(s) Oral two times a day  heparin   Injectable 5000 Unit(s) SubCutaneous every 12 hours  NIFEdipine XL 90 milliGRAM(s) Oral every 24 hours    MEDICATIONS  (PRN):

## 2021-08-07 NOTE — DISCHARGE NOTE OB - PATIENT PORTAL LINK FT
You can access the FollowMyHealth Patient Portal offered by [facility] by registering at the following website: [email or web address]. By joining [facility]’s FollowMyHealth portal, you will also be able to view your health information using other applications (apps) compatible with our system.

## 2021-08-07 NOTE — DISCHARGE NOTE OB - HOSPITAL COURSE
39 y/o P1 admitted with elevated blood pressure, CHTN, super imposed preeclampsia. Antepartum complications included  Reverse End Diastolic Flow with Non reassuring fetal status. Given rescue dose steroids and Delivered viable male infant at 30 weeks via Classical  delivery. Intraoperatvely, multiple bowel adhesions present. Dissection of bowel adhesions in order to access uterus performed. General Surgery present for imbrication of  small bowel serosa.  41 y/o P1 admitted with elevated blood pressure, CHTN, super imposed preeclampsia. Antepartum complications included  Reverse End Diastolic Flow with Non reassuring fetal status. Given rescue dose steroids and Delivered viable male infant at 30 weeks via Classical  delivery. Intraoperatvely, multiple bowel adhesions present. Dissection of bowel adhesions in order to access uterus performed. General Surgery present for imbrication of small bowel serosa. Postoperatively, pt completed 24hr postpartum MgSO4 for seizure ppx. BPs were adequately controlled with Procardia XL 90mg (Labetalol was discontinued). Pt diet was advanced per Surgery and she was started on a regular diet on POD#4. CTAP performed 2/2 abdominal distension and tachycardia, was unremarkable w/o evidence of injury to bowel. She was discharged home on POD#7 tolerating regular diet without nausea/vomiting.

## 2021-08-07 NOTE — PROGRESS NOTE ADULT - TIME BILLING
Acuity of care.   I saw and counseled Ms. Goodwin and agree with Fellow assessment and plan as above.   Deliver for recurrent decelerations. Three times daily monitoring.     Zahraa Rodríguez MD
I saw and counseled Ms. Goodwin.   On FHR tracing this morning, she had two spontaneous decelerations and has required IV push of labetalol for multiple severe-range blood pressures.   Delivery is recommended.   Discussed with Dr. Browne. Plan for CD.     Zahraa Rodríguez MD
I saw and counseled Ms. Goodwin this morning and agree with Resident assessment and plan as above.   Reviewed diagnosis and management of fetal growth restriction and superimposed preeclampsia.  Continue inpatient management with close maternal and fetal surveillance.   RUQ ultrasound cancelled as she had a normal RUQ US on 7/21/2021. F/U Hepatitis panel.   Obtain records from outside hospital for review.     Deliver for:  - HELLP syndrome or eclampsia.   - Nonreassuring fetal status.   - Uncontrolled hypertension.   - Pulmonary edema.   - Labor.  - Concern for placental abruption.   - Worsening UA Doppler studies.     All questions answered.   Zahraa Rodríguez MD
severe PEC with IUGR

## 2021-08-07 NOTE — DISCHARGE NOTE OB - CARE PROVIDER_API CALL
Erin Vincent (MD)  Obstetrics and Gynecology  [facility] [place] [address]  Phone: [phone]  Fax: [phone]  Follow Up Time:

## 2021-08-07 NOTE — CHART NOTE - NSCHARTNOTEFT_GEN_A_CORE
OB Attg  Patient awake and more alert in PACU. Seen again by me and surgical findings discussed regarding  multiple small bowel dense adhesions and imbrication of small bowel by general surgery explained.   Doing well, pain controlled, denies nausea or vomiting. Will plan for clear liquids and continued monitoring of blood pressure.  Plan to start magnesium for severe range blood pressures. Reports some blurry vision.  Wen Browne MD OB Attg  Patient awake and more alert in PACU. Seen again by me and surgical findings discussed regarding  multiple small bowel dense adhesions and imbrication of small bowel by general surgery explained.   Doing well, pain controlled, denies nausea or vomiting. Will plan for clear liquids and continued monitoring of blood pressure.  Plan to continue magnesium for severe range blood pressures with antihypertensives. Reports some blurry vision.  Wen Browne MD

## 2021-08-07 NOTE — CHART NOTE - NSCHARTNOTEFT_GEN_A_CORE
OB Attg (Late entry)  Patient brought down to PACU for Rpt C/s due to nonreassuring fetal status.   Consent not signed upstairs because patient tearful and emotional. In PACU explained   risks of bleeding, infection and damage to surrounding organs. Consent signed and nursing/anesthesia aware  Wen Browne MD

## 2021-08-07 NOTE — PROGRESS NOTE ADULT - ASSESSMENT
39yo  @ 30.2w (by LMP (21) SAKINA 10/13/21) admitted for siPEC w/severe features vs cHTN exacerbation as well as IUGR (2%) with UAD yesterday remarkable for intermittent reverse end diastolic flow. Normal ductus venosus. MCA dopplers demonstrating brain-sparing pattern.  Patient counseled on new finding of umbilical artery dopplers. Reviewed that patient may be delivered soon. Discussed importance of close monitoring of fetal status. Pt given first dose of rescue steroids last night at 9:45pm.     Lab abnormalities notable for isolated elevated LFTs, lab work overall wnl.    #siPEC w/severe features vs cHTN exacerbation  - c/w Procardia XL to 90mg; s/p Labetalol 200 mg TID  - s/p Procardia 10 IR ()  - 24hr urine (): 179  - HELLP labs q3d ()  - monitor BPs, neuro function, strict I&O    #Isolated Elevated LFTs  - RUQ Sono (): contracted gallbladder, no obvious stones  - hepatitis panel (): neg  - continue trending CMP q3d, downtrending at this time    #fetal well being  - NST TID  - ATU (): Vtx, posterior, LUCERO 23.5, 1209 (2%), UA int REDV, MCA brain sparing pattern  - Resuce beta today at 9:45pm complete  - S/p BMZ (-)  - GBS positive ()    #maternal well being  - Reg, SLIV  - COVID neg  - VTE ppx: HSQ, OOB  - MOD: Repeat C/S    [place] PGY3

## 2021-08-07 NOTE — DISCHARGE NOTE OB - NS OB DC TDAP PATIENT INFO
NICU Daily Progress Note     Patient: Amaury Cota  \"Sav" MRN: [id number]        YOB: 2022  Admit date/time:   2021  6:19 PM     GA:  Gestational Age: 36w0d     CGA: 37w 1d    Birth Wt:  5 lb 10.3 oz (2560 g)       Interval Changes:  Oral feeds significantly improved and no gavage needed since yesterday AM. Discharge planning in process. Wt +24g.     There were no labs or images in the last 24 hours. Nursing documentation reviewed including pain assessment.    Physical exam:  General:  infant in open crib.  On continuous cardio-respiratory and pulse oximeter monitoring. Awake, alert and active.  Skin: pink and intact. Mild facial icterus noted. Warm and well perfused.  HEENT: atraumatic and anterior fontanel soft and flat, posterior fontanel fused, prominent sagittal ridge noted. Eyes clear.   CV: The precordium is quiet. Rhythm is regular. No murmur. The pulses are equal and palpable. Capillary refill < 3 seconds.    Lungs: Clear bilaterally to auscultation., Chest expansion equal., Easy effort.  Abdomen: Soft, Rounded, Nontender and Bowel sounds active. No organomegaly.  Extremities: Moves all four extremities.  Equal muscle bulk.  Neurologic: quiet,alert. Tone normal for gestational age. Suck present    Medications:  Scheduled meds:    None    PRN meds:   None    Baby Girl Sarah Cota is a former Gestational Age: 36w0d infant now 8 days old with a corrected gestational age of 37w 1d.  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     feeding problems    Breech birth  Resolved Problems:    TTN (transient tachypnea of )    Observation and evaluation of  for suspected infectious condition    Thrombocytopenia (CMS/[facility])      Health Maintenance:   Vitals 24 Hour Range Most Recent Value   Temperature Temp  Min: 97.9 °F (36.6 °C)  Max: 98.9 °F (37.2 °C) 98.3 °F (36.8 °C)   Pulse Pulse   Min: 123  Max: 198 150   Resp rate Resp  Min: 31  Max: 56 31   Non-Invasive BP BP  Min: 80/35  Max: 97/47 (!) 97/47   MAP MAP (mmHg)  Min: 48  Max: 65 (!) 65   Pulse Ox SpO2  Min: 96 %  Max: 100 % 100 %   FiO2  FiO2 (%)  Min: 21 %  Max: 21 % 21 % (06/11/21 2330)     Respiratory:   24 hour range Most recent value   Resp rate Resp  Min: 31  Max: 56 31   Pulse Ox SpO2  Min: 96 %  Max: 100 % 100 %   FiO2  FiO2 (%)  Min: 21 %  Max: 21 % 21 % (06/11/21 2330)        Last Apnea/Bradycardia:     and intervention:    Number of Apnea/Bradycardia's in previous 24h:  0  Number of Apnea/Bradycardia's requiring stimulation in previous 24h:  0    S/P CPAP 6/4-6/6, HFNC 6/6-6/8    Assessment: TTN resolved.   Plan:  Continue in room air. Continuous cardiorespiratory monitoring.     Cardiovascular:   24 hour range Most recent value   Pulse Pulse  Min: 123  Max: 198 150   Non-Invasive BP BP  Min: 80/35  Max: 97/47 (!) 97/47   Invasive/Arterial BP  No data recorded     Invasive MAP No data recorded     Non-Invasive MAP MAP (mmHg)  Min: 48  Max: 65 (!) 65     Assessment:   No murmur. Normotensive and hemodynamically stable.  PIV 6/4-6/8  Central Line In Place:  No    Plan: Continuous cardiac monitoring    F/E/N/GI/:  Intake/Output       06/11 0700 - 06/12 0659 06/12 0700 - 06/13 0659    P.O. (mL/kg) 362 (144.8) 60 (24)    NG/GT (mL/kg) 8 (3.2)     Total Intake(mL/kg) 370 (148) 60 (24)    Net +370 +60          Urine Occurrence 6 x     Stool Occurrence 5 x         Last stool: 1 (06/11/21 2330) Stool Amount: Smear  Stool Characteristics: Yellow, Seedy, Loose  UOP: appropriate.    Feedings:     Mother desires to pump and feed. Inadequate EBM supply available.  Supplementing with formula.  Bottle feeding with gavage supplementation as needed, none since 6/11 am. Receiving EBM/Similac Advance with majority of intake Mom's milk. Tolerating feeds well with appropriate abdominal exam and voiding/stooling pattern.   Supplements: Vitamin  D    Weight change since birth: -2% at 8 days of life  Weight last 4 values:   Weight    06/10/21 0000 06/11/21 0100 06/12/21 0000 06/12/21 0300   Weight: 2418 g 2432 g 2456 g 2500 g     Last recorded OFC: 33 cm (12.99\") (06/07/21 0230)  Growth trajectory appropriate.   Assessment: Feeding immaturity resolving.  Plan: Continue EBM/Sim Advance ad joe on demand, notify if not meeting minimum of 180 ml/12 hr shift = 150 ml/kg/d.     Continue Vitamin D    ID:    24 Hour Range Most Recent Value   Temperature Temp  Min: 97.9 °F (36.6 °C)  Max: 98.9 °F (37.2 °C) 98.3 °F (36.8 °C)     Blood culture No growth to date  Initial labs reassuring.  Assessment:  Sepsis screen reassuring, antibiotics deferred.  Plan: Follow clinically    Heme:  Maternal blood type:   Information for the patient's mother:  Sarah Cota [[id number]]   B Rh Positive      Infant Blood Type: unknown  HGB (g/dL)   Date Value   2021 17.5     HCT (%)   Date Value   2021 51.1     Bilirubin, Total   Date Value Ref Range Status   2021 13.1 (HH) 2.0 - 6.0 mg/dL Final     Comment:     ;[id number] CALLED CRITICAL RESULTS ON 2021 @ 0752 TO AND READ BACK BY ZINA in NICU   2021 13.3 (HH) 2.0 - 6.0 mg/dL Final     Comment:     ;[id number] CALLED CRITICAL RESULTS ON 2021 @ 0603 TO AND READ BACK BY HA in NICU       Recent Labs   Lab 06/10/21  0707 06/09/21  0530 06/08/21  0521 06/07/21  0525 06/06/21  0530   BILIRUBIN 13.1* 13.3* 12.4* 10.4* 7.4*         Assessment:  Last H/H stable.. Most recent Tbili below treatment threshold with slight decline    Plan: Follow bilirubin clinically.       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    Plan: Will need referral to craniofacial team post-discharge--ordered.   OT consultation and use of positioning devices    Ophth:  Infant is not a candidate for ROP  screening.    Social:  Mother will be updated daily.  Social service consult is in place to address psychosocial needs associated with NICU admission.      Discharge checklist to be completed/reviewed prior to discharge:   Hepatitis B immunization:   Immunization History   Administered Date(s) Administered   • Hep B, adolescent or pediatric 2021       [place] state screen:  21-normal   CHD Screening:   To be completed prior to discharge   Hearing Screen:   To be completed prior to discharge  Car Seat Test:        To be completed prior to discharge if qualifies    Patient Status - CHOOSE ONE OF THE FOLLOWING:  []  Critical Care - Patient remains critical due to (check all that apply):   Life threatening deterioration in patient’s condition:   [] Respiratory failure   [] CNS failure   [] Circulatory failure   [] Shock   [] Renal failure   [] Hepatic failure   [] Metabolic failure   [] Vent dependent   [] Other        [x] Intensive Care (wt) - Patient receiving intensive care due to:   Monitoring for:   [x] Cardiac          [x] Respiratory   [x] Oxygen   [x] Laboratory              [x] Heat maintenance   [x] Continuous and / or frequent vital signs    [x] Enteral and/or parenteral nutritional adjustments   [x] Constant observation by the health care team      Sal Castillo MD 2021 12:29 PM     Vaccine Information Sheet (VIS) provided - VIS Date: 5/9/13

## 2021-08-07 NOTE — OB RN DELIVERY SUMMARY - NSSELHIDDEN_OBGYN_ALL_OB_FT
[NS_DeliveryAttending1_OBGYN_ALL_OB_FT:[id number]],[NS_DeliveryAttending2_OBGYN_ALL_OB_FT:[email or web address]],[NS_DeliveryAssist1_OBGYN_ALL_OB_FT:[id number]],[NS_DeliveryRN_OBGYN_ALL_OB_FT:[id number]==]

## 2021-08-07 NOTE — DISCHARGE NOTE OB - REASON FOR ADMISSION
a/w siPEC w/sev feat vs cHTN exacerbation w/ new dx IUGR (2%) with AEDV a/w siPEC w/sev feat vs cHTN exacerbation w/ new dx IUGR (2%) with AEDV s/p repeat C/S (classical)

## 2021-08-07 NOTE — BRIEF OPERATIVE NOTE - OPERATION/FINDINGS
Called for intra-op consult for unexpected small bowel adhesions encountered during emergent repeat .  After delivery, spinal anesthesia converted to GETA.  I then scrubbed in.  Small bowel freed from anterior abdominal wall and inspected in entirety from proximal to distal ileocecal jxn.  Several areas of deserosalization were imbricated in transverse orientation using interrupted vicryl 3-0 Lembert sutures.  Omentum draped over exposed bowel.  Abdominal wall closure accomplished by ob team.

## 2021-08-07 NOTE — DISCHARGE NOTE OB - CARE PLAN
Principal Discharge DX:	 delivery delivered  Goal:	recovery  Assessment and plan of treatment:	recovery   Principal Discharge DX:	 delivery delivered  Goal:	recovery  Assessment and plan of treatment:	After discharge, please stay on pelvic rest for 6 weeks, meaning no sexual intercourse, no tampons and no douching.  No driving for 2 weeks as women can loose a lot of blood during delivery and there is a possibility of being lightheaded/fainting.  No lifting objects heavier than baby for two weeks.  Expect to have vaginal bleeding/spotting for up to six weeks.  The bleeding should get lighter and more white/light brown with time.  For bleeding soaking more than a pad an hour or passing clots greater than the size of your fist, come in to the emergency department.    Follow up in the office in 1 week for blood pressure check and 2 weeks for incision check.  Call clinic for noticeable increase in redness or swelling at incision, discharge from incision, or opening of skin at incision site.   Principal Discharge DX:	 delivery delivered  Goal:	recovery  Assessment and plan of treatment:	After discharge, please stay on pelvic rest for 6 weeks, meaning no sexual intercourse, no tampons and no douching.  No driving for 2 weeks as women can loose a lot of blood during delivery and there is a possibility of being lightheaded/fainting.  No lifting objects heavier than baby for two weeks.  Expect to have vaginal bleeding/spotting for up to six weeks.  The bleeding should get lighter and more white/light brown with time.  For bleeding soaking more than a pad an hour or passing clots greater than the size of your fist, come in to the emergency department.    Follow up in the office in  2 weeks for incision check.  Call clinic for noticeable increase in redness or swelling at incision, discharge from incision, or opening of skin at incision site.  Secondary Diagnosis:	Severe preeclampsia, third trimester  Assessment and plan of treatment:	-Take your blood pressure medication every day  -Measure your blood pressure twice per day.  If your SBP > 140 (top number) or your DBP > 90 (bottom number call your doctor   1

## 2021-08-07 NOTE — DISCHARGE NOTE OB - PLAN OF CARE
recovery After discharge, please stay on pelvic rest for 6 weeks, meaning no sexual intercourse, no tampons and no douching.  No driving for 2 weeks as women can loose a lot of blood during delivery and there is a possibility of being lightheaded/fainting.  No lifting objects heavier than baby for two weeks.  Expect to have vaginal bleeding/spotting for up to six weeks.  The bleeding should get lighter and more white/light brown with time.  For bleeding soaking more than a pad an hour or passing clots greater than the size of your fist, come in to the emergency department.    Follow up in the office in 1 week for blood pressure check and 2 weeks for incision check.  Call clinic for noticeable increase in redness or swelling at incision, discharge from incision, or opening of skin at incision site. -Take your blood pressure medication every day  -Measure your blood pressure twice per day.  If your SBP > 140 (top number) or your DBP > 90 (bottom number call your doctor After discharge, please stay on pelvic rest for 6 weeks, meaning no sexual intercourse, no tampons and no douching.  No driving for 2 weeks as women can loose a lot of blood during delivery and there is a possibility of being lightheaded/fainting.  No lifting objects heavier than baby for two weeks.  Expect to have vaginal bleeding/spotting for up to six weeks.  The bleeding should get lighter and more white/light brown with time.  For bleeding soaking more than a pad an hour or passing clots greater than the size of your fist, come in to the emergency department.    Follow up in the office in  2 weeks for incision check.  Call clinic for noticeable increase in redness or swelling at incision, discharge from incision, or opening of skin at incision site.

## 2021-08-07 NOTE — DISCHARGE NOTE OB - MEDICATION SUMMARY - MEDICATIONS TO TAKE
I will START or STAY ON the medications listed below when I get home from the hospital:    acetaminophen 325 mg oral tablet  -- 3 tab(s) by mouth   -- Indication: For pain    ibuprofen 600 mg oral tablet  -- 1 tab(s) by mouth every 6 hours  -- Indication: For pain   I will START or STAY ON the medications listed below when I get home from the hospital:    acetaminophen 325 mg oral tablet  -- 3 tab(s) by mouth   -- Indication: For pain    ibuprofen 600 mg oral tablet  -- 1 tab(s) by mouth every 6 hours  -- Indication: For pain    Procardia XL 90 mg oral tablet, extended release  -- 1 tab(s) by mouth once a day   -- Avoid grapefruit and grapefruit juice while taking this medication.  It is very important that you take or use this exactly as directed.  Do not skip doses or discontinue unless directed by your doctor.  Some non-prescription drugs may aggravate your condition.  Read all labels carefully.  If a warning appears, check with your doctor before taking.  Swallow whole.  Do not crush.    -- Indication: For blood pressure control

## 2021-08-07 NOTE — OB NEONATOLOGY/PEDIATRICIAN DELIVERY SUMMARY - NSPEDSNEONOTESA_OBGYN_ALL_OB_FT
Attended Delivery Note Neonatology:  Requested to attend scheduled repeat CS delivery of a 30 3/7 week  with cat II tracing and IUGR.   Mother is a 40yo  at 30 3/7 weeks of gestation with chronic hypertension with superimposed preeclampsia with severe features, intrauterine growth restriction (2nd %tile) with initial AEDV, which subsequently changed to intermittent reverse end diastolic flow on . Prenatal labs  Negative/NR/Immune. GBS positive on , no labor or rupture. BMZ complete on  &  with rescue BMZ on .  Maternal PMHx: AMA, cHTN (dx , Labetalol 200mg TID, Procardia XL 60mg), h/o previous 29w delivery via C/S 2/2 siPEC w/severe features. AROM at delivery, clear fluid.  Delivery by CS, Vertex presentation. Emerged vigorous. No delayed cord clamping. Warmed, dried, and stimulated. APGAR 7/9. Was found to hypoxic to 70% without improvement in cyanosis at 2 mins of life, CPAP was initiated at 5 with FiO2 of up to 50%. Baby responded with improvement in saturations and respiratory effort. O2 was weaned down to RA with saturations noted in 90s at 5 mins of life.   Mother wants breast feed. Attended Delivery Note Neonatology:  Requested to attend scheduled repeat CS delivery of a 30 3/7 week  with cat II tracing and IUGR.   Mother is a 42yo  at 30 3/7 weeks of gestation with chronic hypertension with superimposed preeclampsia with severe features, intrauterine growth restriction (2nd %tile) with initial AEDV, which subsequently changed to intermittent reverse end diastolic flow on . Prenatal labs  Negative/NR/Immune. GBS positive on , no labor or rupture. BMZ complete on  &  with rescue BMZ on .  Maternal PMHx: AMA, cHTN (dx , Labetalol 200mg TID, Procardia XL 60mg), h/o previous 29w delivery via C/S 2/2 siPEC w/severe features. AROM at delivery, clear fluid.  Delivery by CS, Vertex presentation. Emerged vigorous. No delayed cord clamping. Warmed, dried, and stimulated. APGAR 7/9. Was found to hypoxic to 70% without improvement in cyanosis at 2 mins of life, CPAP was initiated at 5 with FiO2 of up to 50%. Baby responded with improvement in saturations and respiratory effort. O2 was weaned down to RA with saturations noted in 90s at 5 mins of life.   Mother wants breast feed. Baby was taken back to OR to visit mom and then transported to the NICU on CPAP 5 with Vance Cannula.

## 2021-08-07 NOTE — OB RN INTRAOPERATIVE NOTE - NSSELHIDDEN_OBGYN_ALL_OB_FT
[NS_DeliveryAttending1_OBGYN_ALL_OB_FT:[id number]],[NS_DeliveryAttending2_OBGYN_ALL_OB_FT:[id number]],[NS_DeliveryAssist1_OBGYN_ALL_OB_FT:[id number]],[NS_DeliveryRN_OBGYN_ALL_OB_FT:[id number]]

## 2021-08-08 LAB
ALBUMIN SERPL ELPH-MCNC: 3 G/DL — LOW (ref 3.3–5)
ALBUMIN SERPL ELPH-MCNC: 3.2 G/DL — LOW (ref 3.3–5)
ALP SERPL-CCNC: 101 U/L — SIGNIFICANT CHANGE UP (ref 40–120)
ALP SERPL-CCNC: 97 U/L — SIGNIFICANT CHANGE UP (ref 40–120)
ALT FLD-CCNC: 67 U/L — HIGH (ref 4–33)
ALT FLD-CCNC: 81 U/L — HIGH (ref 4–33)
ANION GAP SERPL CALC-SCNC: 12 MMOL/L — SIGNIFICANT CHANGE UP (ref 7–14)
ANION GAP SERPL CALC-SCNC: 14 MMOL/L — SIGNIFICANT CHANGE UP (ref 7–14)
ANISOCYTOSIS BLD QL: SLIGHT — SIGNIFICANT CHANGE UP
APTT BLD: 22.9 SEC — LOW (ref 27–36.3)
APTT BLD: 28.7 SEC — SIGNIFICANT CHANGE UP (ref 27–36.3)
AST SERPL-CCNC: 55 U/L — HIGH (ref 4–32)
AST SERPL-CCNC: 69 U/L — HIGH (ref 4–32)
BASOPHILS # BLD AUTO: 0 K/UL — SIGNIFICANT CHANGE UP (ref 0–0.2)
BASOPHILS # BLD AUTO: 0.04 K/UL — SIGNIFICANT CHANGE UP (ref 0–0.2)
BASOPHILS # BLD AUTO: 0.06 K/UL — SIGNIFICANT CHANGE UP (ref 0–0.2)
BASOPHILS NFR BLD AUTO: 0 % — SIGNIFICANT CHANGE UP (ref 0–2)
BASOPHILS NFR BLD AUTO: 0.2 % — SIGNIFICANT CHANGE UP (ref 0–2)
BASOPHILS NFR BLD AUTO: 0.3 % — SIGNIFICANT CHANGE UP (ref 0–2)
BILIRUB SERPL-MCNC: <0.2 MG/DL — SIGNIFICANT CHANGE UP (ref 0.2–1.2)
BILIRUB SERPL-MCNC: <0.2 MG/DL — SIGNIFICANT CHANGE UP (ref 0.2–1.2)
BUN SERPL-MCNC: 13 MG/DL — SIGNIFICANT CHANGE UP (ref 7–23)
BUN SERPL-MCNC: 14 MG/DL — SIGNIFICANT CHANGE UP (ref 7–23)
CALCIUM SERPL-MCNC: 7.7 MG/DL — LOW (ref 8.4–10.5)
CALCIUM SERPL-MCNC: 7.7 MG/DL — LOW (ref 8.4–10.5)
CHLORIDE SERPL-SCNC: 97 MMOL/L — LOW (ref 98–107)
CHLORIDE SERPL-SCNC: 99 MMOL/L — SIGNIFICANT CHANGE UP (ref 98–107)
CO2 SERPL-SCNC: 20 MMOL/L — LOW (ref 22–31)
CO2 SERPL-SCNC: 21 MMOL/L — LOW (ref 22–31)
CREAT SERPL-MCNC: 0.86 MG/DL — SIGNIFICANT CHANGE UP (ref 0.5–1.3)
CREAT SERPL-MCNC: 0.87 MG/DL — SIGNIFICANT CHANGE UP (ref 0.5–1.3)
EOSINOPHIL # BLD AUTO: 0 K/UL — SIGNIFICANT CHANGE UP (ref 0–0.5)
EOSINOPHIL NFR BLD AUTO: 0 % — SIGNIFICANT CHANGE UP (ref 0–6)
FIBRINOGEN PPP-MCNC: 531 MG/DL — HIGH (ref 290–520)
FIBRINOGEN PPP-MCNC: 552 MG/DL — HIGH (ref 290–520)
GIANT PLATELETS BLD QL SMEAR: PRESENT — SIGNIFICANT CHANGE UP
GLUCOSE SERPL-MCNC: 103 MG/DL — HIGH (ref 70–99)
GLUCOSE SERPL-MCNC: 113 MG/DL — HIGH (ref 70–99)
HCT VFR BLD CALC: 25 % — LOW (ref 34.5–45)
HCT VFR BLD CALC: 25.5 % — LOW (ref 34.5–45)
HGB BLD-MCNC: 8.4 G/DL — LOW (ref 11.5–15.5)
HGB BLD-MCNC: 8.5 G/DL — LOW (ref 11.5–15.5)
IANC: 14.45 K/UL — HIGH (ref 1.5–8.5)
IANC: 18.76 K/UL — HIGH (ref 1.5–8.5)
IMM GRANULOCYTES NFR BLD AUTO: 1.5 % — SIGNIFICANT CHANGE UP (ref 0–1.5)
IMM GRANULOCYTES NFR BLD AUTO: 1.5 % — SIGNIFICANT CHANGE UP (ref 0–1.5)
INR BLD: 0.91 RATIO — SIGNIFICANT CHANGE UP (ref 0.88–1.16)
LDH SERPL L TO P-CCNC: 310 U/L — HIGH (ref 135–225)
LDH SERPL L TO P-CCNC: 330 U/L — HIGH (ref 135–225)
LYMPHOCYTES # BLD AUTO: 1.77 K/UL — SIGNIFICANT CHANGE UP (ref 1–3.3)
LYMPHOCYTES # BLD AUTO: 12.1 % — LOW (ref 13–44)
LYMPHOCYTES # BLD AUTO: 13 % — SIGNIFICANT CHANGE UP (ref 13–44)
LYMPHOCYTES # BLD AUTO: 2.42 K/UL — SIGNIFICANT CHANGE UP (ref 1–3.3)
LYMPHOCYTES # BLD AUTO: 2.89 K/UL — SIGNIFICANT CHANGE UP (ref 1–3.3)
LYMPHOCYTES # BLD AUTO: 6.1 % — LOW (ref 13–44)
MACROCYTES BLD QL: SLIGHT — SIGNIFICANT CHANGE UP
MAGNESIUM SERPL-MCNC: 5.4 MG/DL — HIGH (ref 1.6–2.6)
MAGNESIUM SERPL-MCNC: 7.3 MG/DL — CRITICAL HIGH (ref 1.6–2.6)
MCHC RBC-ENTMCNC: 31.6 PG — SIGNIFICANT CHANGE UP (ref 27–34)
MCHC RBC-ENTMCNC: 31.7 PG — SIGNIFICANT CHANGE UP (ref 27–34)
MCHC RBC-ENTMCNC: 33.3 GM/DL — SIGNIFICANT CHANGE UP (ref 32–36)
MCHC RBC-ENTMCNC: 33.6 GM/DL — SIGNIFICANT CHANGE UP (ref 32–36)
MCV RBC AUTO: 94 FL — SIGNIFICANT CHANGE UP (ref 80–100)
MCV RBC AUTO: 95.1 FL — SIGNIFICANT CHANGE UP (ref 80–100)
MONOCYTES # BLD AUTO: 1.25 K/UL — HIGH (ref 0–0.9)
MONOCYTES # BLD AUTO: 1.38 K/UL — HIGH (ref 0–0.9)
MONOCYTES # BLD AUTO: 1.79 K/UL — HIGH (ref 0–0.9)
MONOCYTES NFR BLD AUTO: 4.3 % — SIGNIFICANT CHANGE UP (ref 2–14)
MONOCYTES NFR BLD AUTO: 7.4 % — SIGNIFICANT CHANGE UP (ref 2–14)
MONOCYTES NFR BLD AUTO: 7.5 % — SIGNIFICANT CHANGE UP (ref 2–14)
NEUTROPHILS # BLD AUTO: 14.45 K/UL — HIGH (ref 1.8–7.4)
NEUTROPHILS # BLD AUTO: 18.76 K/UL — HIGH (ref 1.8–7.4)
NEUTROPHILS # BLD AUTO: 25.71 K/UL — HIGH (ref 1.8–7.4)
NEUTROPHILS NFR BLD AUTO: 77.9 % — HIGH (ref 43–77)
NEUTROPHILS NFR BLD AUTO: 78.6 % — HIGH (ref 43–77)
NEUTROPHILS NFR BLD AUTO: 86.1 % — HIGH (ref 43–77)
NEUTS BAND # BLD: 2.6 % — SIGNIFICANT CHANGE UP (ref 0–6)
NRBC # BLD: 0 /100 WBCS — SIGNIFICANT CHANGE UP
NRBC # BLD: 0 /100 WBCS — SIGNIFICANT CHANGE UP
NRBC # FLD: 0.02 K/UL — HIGH
NRBC # FLD: 0.02 K/UL — HIGH
PLAT MORPH BLD: NORMAL — SIGNIFICANT CHANGE UP
PLATELET # BLD AUTO: 193 K/UL — SIGNIFICANT CHANGE UP (ref 150–400)
PLATELET # BLD AUTO: 199 K/UL — SIGNIFICANT CHANGE UP (ref 150–400)
PLATELET COUNT - ESTIMATE: NORMAL — SIGNIFICANT CHANGE UP
POLYCHROMASIA BLD QL SMEAR: SLIGHT — SIGNIFICANT CHANGE UP
POTASSIUM SERPL-MCNC: 3.8 MMOL/L — SIGNIFICANT CHANGE UP (ref 3.5–5.3)
POTASSIUM SERPL-MCNC: 3.9 MMOL/L — SIGNIFICANT CHANGE UP (ref 3.5–5.3)
POTASSIUM SERPL-SCNC: 3.8 MMOL/L — SIGNIFICANT CHANGE UP (ref 3.5–5.3)
POTASSIUM SERPL-SCNC: 3.9 MMOL/L — SIGNIFICANT CHANGE UP (ref 3.5–5.3)
PROT SERPL-MCNC: 5.2 G/DL — LOW (ref 6–8.3)
PROT SERPL-MCNC: 5.7 G/DL — LOW (ref 6–8.3)
PROTHROM AB SERPL-ACNC: 10.5 SEC — LOW (ref 10.6–13.6)
RBC # BLD: 2.66 M/UL — LOW (ref 3.8–5.2)
RBC # BLD: 2.68 M/UL — LOW (ref 3.8–5.2)
RBC # FLD: 13.3 % — SIGNIFICANT CHANGE UP (ref 10.3–14.5)
RBC # FLD: 13.4 % — SIGNIFICANT CHANGE UP (ref 10.3–14.5)
RBC BLD AUTO: NORMAL — SIGNIFICANT CHANGE UP
SODIUM SERPL-SCNC: 129 MMOL/L — LOW (ref 135–145)
SODIUM SERPL-SCNC: 134 MMOL/L — LOW (ref 135–145)
URATE SERPL-MCNC: 6.6 MG/DL — SIGNIFICANT CHANGE UP (ref 2.5–7)
VARIANT LYMPHS # BLD: 0.9 % — SIGNIFICANT CHANGE UP (ref 0–6)
WBC # BLD: 18.57 K/UL — HIGH (ref 3.8–10.5)
WBC # BLD: 23.86 K/UL — HIGH (ref 3.8–10.5)
WBC # FLD AUTO: 18.57 K/UL — HIGH (ref 3.8–10.5)
WBC # FLD AUTO: 23.86 K/UL — HIGH (ref 3.8–10.5)

## 2021-08-08 RX ORDER — SODIUM CHLORIDE 9 MG/ML
1000 INJECTION, SOLUTION INTRAVENOUS
Refills: 0 | Status: DISCONTINUED | OUTPATIENT
Start: 2021-08-08 | End: 2021-08-11

## 2021-08-08 RX ADMIN — Medication 30 MILLIGRAM(S): at 23:51

## 2021-08-08 RX ADMIN — SODIUM CHLORIDE 50 MILLILITER(S): 9 INJECTION, SOLUTION INTRAVENOUS at 01:30

## 2021-08-08 RX ADMIN — HEPARIN SODIUM 5000 UNIT(S): 5000 INJECTION INTRAVENOUS; SUBCUTANEOUS at 21:20

## 2021-08-08 RX ADMIN — Medication 30 MILLIGRAM(S): at 04:15

## 2021-08-08 RX ADMIN — Medication 30 MILLIGRAM(S): at 03:23

## 2021-08-08 RX ADMIN — Medication 90 MILLIGRAM(S): at 17:32

## 2021-08-08 RX ADMIN — Medication 200 MILLIGRAM(S): at 21:20

## 2021-08-08 RX ADMIN — Medication 30 MILLIGRAM(S): at 09:39

## 2021-08-08 RX ADMIN — HEPARIN SODIUM 5000 UNIT(S): 5000 INJECTION INTRAVENOUS; SUBCUTANEOUS at 09:42

## 2021-08-08 RX ADMIN — Medication 30 MILLIGRAM(S): at 17:32

## 2021-08-08 RX ADMIN — Medication 30 MILLIGRAM(S): at 10:24

## 2021-08-08 NOTE — PROGRESS NOTE ADULT - SUBJECTIVE AND OBJECTIVE BOX
Postop Day  __1_ s/p   C- Section    THERAPY:  [ x ] Spinal morphine   [  ] Epidural morphine   [  ] IV PCA Hydromorphone 1 mg/ml      Sedation Score:	  [ x ] Alert	    [  ] Drowsy        [  ] Arousable	[  ] Asleep	[  ] Unresponsive    Side Effects:	  [ x ] None	     [  ] Nausea        [  ] Pruritus        [  ] Weakness   [  ] Numbness        ASSESSMENT/ PLAN   [   ] Discontinue         [  ] Continue  [ x ]Documentation and Verification of current medications     Comments:       Patient is doing well.  OOBAA. Tolerating clears.  Pain is tolerable.  No residual anesthetic issues or complications noted.

## 2021-08-08 NOTE — PROGRESS NOTE ADULT - ASSESSMENT
40y F w/pmhx HTN, now POD#1 s/p repeat C/s (classical) @30wk for IUGR with REDV, and lysis of bowel adhesions with repair of bowel serosa by general surgery. Patient is recovering well on floor.       RECOMMENDATIONS:  - advance diet, as tolerated  - AROBF  - OOBAT  - DVT ppx: heparin subq  - pain control PRN  - appreciate care per primary team      surgery, team b  pager [phone]

## 2021-08-08 NOTE — PROGRESS NOTE ADULT - SUBJECTIVE AND OBJECTIVE BOX
OB Postpartum Note: Repeat  Delivery, POD#1     S: 39yo POD#1 s/p repeat C/s (classical) @30wk for IUGR with REDV, and lysis of bowel adhesions with repair of bowel serosa by gen surg. Her pain is well controlled. She is tolerating a clears diet and passing flatus. Denies N/V. Denies CP/SOB/lightheadedness/dizziness.  Ambulating without difficulty. Voiding spontaneously. Denies Heavy vaginal bleeding    O:   Vitals:  Vital Signs Last 24 Hrs  T(C): 37.2 (08 Aug 2021 00:00), Max: 37.2 (08 Aug 2021 00:00)  T(F): 98.9 (08 Aug 2021 00:00), Max: 98.9 (08 Aug 2021 00:00)  HR: 88 (08 Aug 2021 02:00) (76 - 126)  BP: 102/65 (08 Aug 2021 02:00) (102/65 - 182/98)  BP(mean): 77 (08 Aug 2021 02:00) (77 - 121)  RR: 20 (08 Aug 2021 02:00) (8 - 23)  SpO2: 100% (08 Aug 2021 02:00) (96% - 100%)    MEDICATIONS  (STANDING):  acetaminophen   Tablet .. 975 milliGRAM(s) Oral <User Schedule>  diphtheria/tetanus/pertussis (acellular) Vaccine (ADAcel) 0.5 milliLiter(s) IntraMuscular once  heparin   Injectable 5000 Unit(s) SubCutaneous every 12 hours  ibuprofen  Tablet. 600 milliGRAM(s) Oral every 6 hours  ketorolac   Injectable 30 milliGRAM(s) IV Push every 6 hours  labetalol 200 milliGRAM(s) Oral three times a day  lactated ringers. 1000 milliLiter(s) (75 mL/Hr) IV Continuous <Continuous>  lactated ringers. 1000 milliLiter(s) (50 mL/Hr) IV Continuous <Continuous>  magnesium sulfate Infusion 2 Gm/Hr (50 mL/Hr) IV Continuous <Continuous>  NIFEdipine XL 90 milliGRAM(s) Oral daily  oxytocin Infusion  milliUNIT(s)/Min (1000 mL/Hr) IV Continuous <Continuous>    MEDICATIONS  (PRN):  dexAMETHasone  Injectable 4 milliGRAM(s) IV Push every 6 hours PRN Nausea  diphenhydrAMINE 25 milliGRAM(s) Oral every 6 hours PRN Pruritus  lanolin Ointment 1 Application(s) Topical every 6 hours PRN Sore Nipples  magnesium hydroxide Suspension 30 milliLiter(s) Oral two times a day PRN Constipation  nalbuphine Injectable 2.5 milliGRAM(s) IV Push every 6 hours PRN Pruritus  naloxone Injectable 0.1 milliGRAM(s) IV Push every 3 minutes PRN For ANY of the following changes in patient status:  A. Breaths Per Minute LESS THAN 10, B. Oxygen saturation LESS THAN 90%, C. Sedation score of 6 for Stop After: 4 Times  ondansetron Injectable 4 milliGRAM(s) IV Push every 6 hours PRN Nausea  oxyCODONE    IR 5 milliGRAM(s) Oral every 3 hours PRN Moderate to Severe Pain (4-10)  oxyCODONE    IR 5 milliGRAM(s) Oral once PRN Moderate to Severe Pain (4-10)  oxyCODONE    IR 5 milliGRAM(s) Oral every 3 hours PRN Mild Pain (1 - 3)  oxyCODONE    IR 10 milliGRAM(s) Oral every 3 hours PRN Moderate Pain (4 - 6)  simethicone 80 milliGRAM(s) Chew every 4 hours PRN Gas      Labs:  Blood type: A Positive  Rubella IgG: RPR: Negative                          10.2<L>   28.98<H> >-----------< 255    (  @ 22:33 )             31.5<L>                        12.9   19.19<H> >-----------< 309    (  @ 10:52 )             38.2                        11.7   13.46<H> >-----------< 275    (  @ 06:03 )             34.8    21 @ 22:33      132<L>  |  97<L>  |  12  ----------------------------<  146<H>  4.2   |  16<L>  |  0.74    21 @ 10:52      136  |  102  |  14  ----------------------------<  105<H>  4.3   |  18<L>  |  0.70    21 @ 06:03      134<L>  |  103  |  12  ----------------------------<  88  4.1   |  18<L>  |  0.68        Ca    8.6      07 Aug 2021 22:33  Ca    10.0      07 Aug 2021 10:52  Ca    9.4      06 Aug 2021 06:03  Mg     6.00<H>         TPro  6.3  /  Alb  3.4  /  TBili  <0.2  /  DBili  x   /  AST  46<H>  /  ALT  51<H>  /  AlkPhos  116  21 @ 22:33  TPro  7.6  /  Alb  4.1  /  TBili  0.2  /  DBili  x   /  AST  34<H>  /  ALT  41<H>  /  AlkPhos  152<H>  21 @ 10:52  TPro  6.3  /  Alb  3.4  /  TBili  0.2  /  DBili  x   /  AST  21  /  ALT  29  /  AlkPhos  129<H>  21 @ 06:03          PE:  General: NAD  Abdomen: mildly distended,appropriately tender, incision c/d/i.  Extremities: SCDs in place, no erythema

## 2021-08-08 NOTE — PROGRESS NOTE ADULT - SUBJECTIVE AND OBJECTIVE BOX
TEAM B SURGERY PROGRESS NOTE    Hospital Day #16d  Procedure/Dx: Repeat C/S, POD #1d      SUBJECTIVE  Pt seen and examined at bedside. No complaints.  Pain controlled. Denies N/V. Tolerating liquid diet. Passing flatus and BM. No acute events overnight.       PMHx  ·	Hypertension      OBJECTIVE:    PHYSICAL EXAM   General Appearance: Appears well, NAD  Resp: Patent airway, non-labored breathing  CV: RRR  Abdomen: Soft, Nontender, Nondistended, incision clean/dry/intact    Vital Signs Last 24 Hrs  T(C): 37 (08 Aug 2021 06:00), Max: 37.2 (08 Aug 2021 00:00)  T(F): 98.6 (08 Aug 2021 06:00), Max: 98.9 (08 Aug 2021 00:00)  HR: 93 (08 Aug 2021 06:00) (76 - 126)  BP: 107/62 (08 Aug 2021 06:00) (102/65 - 182/98)  BP(mean): 79 (08 Aug 2021 06:00) (72 - 121)  RR: 16 (08 Aug 2021 06:00) (8 - 23)  SpO2: 100% (08 Aug 2021 06:00) (96% - 100%)      I's & O's    08-07-21 @ 07:01  -  08-08-21 @ 07:00  --------------------------------------------------------  IN:    Lactated Ringers: 300 mL    Magnesium Sulfate: 550 mL    Oxytocin: 300 mL  Total IN: 1150 mL    OUT:    Indwelling Catheter - Urethral (mL): 1365 mL    Voided (mL): 500 mL  Total OUT: 1865 mL    Total NET: -715 mL      08-08-21 @ 07:01  -  08-08-21 @ 09:23  --------------------------------------------------------  IN:    Lactated Ringers: 100 mL    Magnesium Sulfate: 25 mL  Total IN: 125 mL    OUT:    Voided (mL): 400 mL  Total OUT: 400 mL    Total NET: -275 mL            MEDICATIONS:  ·	DVT PROPHYLAXIS: heparin   Injectable 5000 Unit(s)        LAB/STUDIES:                        8.4    23.86 )-----------( 199      ( 08 Aug 2021 05:46 )             25.0       129<L>  |  97<L>  |  14  ----------------------------<  113<H>  3.8   |  20<L>  |  0.86    Ca    7.7<L>      08 Aug 2021 05:46  Mg     7.30     08-08    TPro  5.2<L>  /  Alb  3.0<L>  /  TBili  <0.2  /  DBili  x   /  AST  55<H>  /  ALT  67<H>  /  AlkPhos  97  08-08    PT/INR - ( 07 Aug 2021 22:33 )   PT: 10.5 sec;   INR: 0.91 ratio    PTT - ( 08 Aug 2021 05:46 )  PTT:22.9 sec    LIVER FUNCTIONS - ( 08 Aug 2021 05:46 )  Alb: 3.0 g/dL / Pro: 5.2 g/dL / ALK PHOS: 97 U/L / ALT: 67 U/L / AST: 55 U/L / GGT: x

## 2021-08-08 NOTE — PROGRESS NOTE ADULT - ASSESSMENT
A/P: 41yo POD#1 s/p repeat c/s (classical) and repair of bowel serosa, PNC c/b siPEC.  Patient is stable and doing well post-operatively.      - d/c Mg 8/8 @3p  - Continue CLD  - Increase ambulation  - Continue motrin, tylenol, oxycodone PRN for pain control.   - F/u AM HELLP labs  - c/w Procardia XL to 90mg and Labetalol 200 mg TID  - Gen surg following    Robyn Frankel PGY-3

## 2021-08-09 LAB
ALBUMIN SERPL ELPH-MCNC: 2.9 G/DL — LOW (ref 3.3–5)
ALP SERPL-CCNC: 84 U/L — SIGNIFICANT CHANGE UP (ref 40–120)
ALT FLD-CCNC: 74 U/L — HIGH (ref 4–33)
ANION GAP SERPL CALC-SCNC: 9 MMOL/L — SIGNIFICANT CHANGE UP (ref 7–14)
AST SERPL-CCNC: 49 U/L — HIGH (ref 4–32)
BASOPHILS # BLD AUTO: 0.05 K/UL — SIGNIFICANT CHANGE UP (ref 0–0.2)
BASOPHILS NFR BLD AUTO: 0.4 % — SIGNIFICANT CHANGE UP (ref 0–2)
BILIRUB SERPL-MCNC: <0.2 MG/DL — SIGNIFICANT CHANGE UP (ref 0.2–1.2)
BUN SERPL-MCNC: 12 MG/DL — SIGNIFICANT CHANGE UP (ref 7–23)
CALCIUM SERPL-MCNC: 8.4 MG/DL — SIGNIFICANT CHANGE UP (ref 8.4–10.5)
CHLORIDE SERPL-SCNC: 106 MMOL/L — SIGNIFICANT CHANGE UP (ref 98–107)
CO2 SERPL-SCNC: 23 MMOL/L — SIGNIFICANT CHANGE UP (ref 22–31)
CREAT SERPL-MCNC: 0.77 MG/DL — SIGNIFICANT CHANGE UP (ref 0.5–1.3)
EOSINOPHIL # BLD AUTO: 0.03 K/UL — SIGNIFICANT CHANGE UP (ref 0–0.5)
EOSINOPHIL NFR BLD AUTO: 0.2 % — SIGNIFICANT CHANGE UP (ref 0–6)
GLUCOSE SERPL-MCNC: 97 MG/DL — SIGNIFICANT CHANGE UP (ref 70–99)
HCT VFR BLD CALC: 23 % — LOW (ref 34.5–45)
HGB BLD-MCNC: 7.5 G/DL — LOW (ref 11.5–15.5)
IANC: 9.62 K/UL — HIGH (ref 1.5–8.5)
IMM GRANULOCYTES NFR BLD AUTO: 1.4 % — SIGNIFICANT CHANGE UP (ref 0–1.5)
INR BLD: 0.93 RATIO — SIGNIFICANT CHANGE UP (ref 0.88–1.16)
LYMPHOCYTES # BLD AUTO: 2.89 K/UL — SIGNIFICANT CHANGE UP (ref 1–3.3)
LYMPHOCYTES # BLD AUTO: 20.6 % — SIGNIFICANT CHANGE UP (ref 13–44)
MCHC RBC-ENTMCNC: 31.9 PG — SIGNIFICANT CHANGE UP (ref 27–34)
MCHC RBC-ENTMCNC: 32.6 GM/DL — SIGNIFICANT CHANGE UP (ref 32–36)
MCV RBC AUTO: 97.9 FL — SIGNIFICANT CHANGE UP (ref 80–100)
MONOCYTES # BLD AUTO: 1.24 K/UL — HIGH (ref 0–0.9)
MONOCYTES NFR BLD AUTO: 8.8 % — SIGNIFICANT CHANGE UP (ref 2–14)
NEUTROPHILS # BLD AUTO: 9.62 K/UL — HIGH (ref 1.8–7.4)
NEUTROPHILS NFR BLD AUTO: 68.6 % — SIGNIFICANT CHANGE UP (ref 43–77)
NRBC # BLD: 0 /100 WBCS — SIGNIFICANT CHANGE UP
NRBC # FLD: 0.04 K/UL — HIGH
PLATELET # BLD AUTO: 188 K/UL — SIGNIFICANT CHANGE UP (ref 150–400)
POTASSIUM SERPL-MCNC: 3.8 MMOL/L — SIGNIFICANT CHANGE UP (ref 3.5–5.3)
POTASSIUM SERPL-SCNC: 3.8 MMOL/L — SIGNIFICANT CHANGE UP (ref 3.5–5.3)
PROT SERPL-MCNC: 5.4 G/DL — LOW (ref 6–8.3)
PROTHROM AB SERPL-ACNC: 10.7 SEC — SIGNIFICANT CHANGE UP (ref 10.6–13.6)
RBC # BLD: 2.35 M/UL — LOW (ref 3.8–5.2)
RBC # FLD: 13.4 % — SIGNIFICANT CHANGE UP (ref 10.3–14.5)
SODIUM SERPL-SCNC: 138 MMOL/L — SIGNIFICANT CHANGE UP (ref 135–145)
WBC # BLD: 14.02 K/UL — HIGH (ref 3.8–10.5)
WBC # FLD AUTO: 14.02 K/UL — HIGH (ref 3.8–10.5)

## 2021-08-09 RX ORDER — ASCORBIC ACID 60 MG
500 TABLET,CHEWABLE ORAL DAILY
Refills: 0 | Status: DISCONTINUED | OUTPATIENT
Start: 2021-08-09 | End: 2021-08-14

## 2021-08-09 RX ORDER — FERROUS SULFATE 325(65) MG
325 TABLET ORAL
Refills: 0 | Status: DISCONTINUED | OUTPATIENT
Start: 2021-08-09 | End: 2021-08-14

## 2021-08-09 RX ADMIN — HEPARIN SODIUM 5000 UNIT(S): 5000 INJECTION INTRAVENOUS; SUBCUTANEOUS at 11:08

## 2021-08-09 RX ADMIN — Medication 30 MILLIGRAM(S): at 18:10

## 2021-08-09 RX ADMIN — HEPARIN SODIUM 5000 UNIT(S): 5000 INJECTION INTRAVENOUS; SUBCUTANEOUS at 22:33

## 2021-08-09 RX ADMIN — Medication 90 MILLIGRAM(S): at 18:09

## 2021-08-09 RX ADMIN — Medication 325 MILLIGRAM(S): at 18:09

## 2021-08-09 RX ADMIN — Medication 500 MILLIGRAM(S): at 12:54

## 2021-08-09 RX ADMIN — Medication 30 MILLIGRAM(S): at 06:13

## 2021-08-09 RX ADMIN — Medication 30 MILLIGRAM(S): at 19:10

## 2021-08-09 RX ADMIN — Medication 30 MILLIGRAM(S): at 06:40

## 2021-08-09 RX ADMIN — Medication 30 MILLIGRAM(S): at 00:25

## 2021-08-09 NOTE — PROGRESS NOTE ADULT - ASSESSMENT
40y F w/ pmhx HTN, now POD#1 s/p repeat C/s (classical) @30wk for IUGR with REDV, and lysis of bowel adhesions with repair of bowel serosa by general surgery. Patient is recovering well on floor.     PLAN:  - Advance diet as tolerated  - Await return of bowel function  - c/w ambulation to encourage bowel function  - Care per primary team    PARAS Espinal, PGY-2  [facility]  B Team Surgery  [id number]

## 2021-08-09 NOTE — PROGRESS NOTE ADULT - NSICDXPILOT_GEN_ALL_CORE
[place]

## 2021-08-09 NOTE — PROGRESS NOTE ADULT - ASSESSMENT
A/P: 41yo POD#2 s/p repeat c/s (classical) and repair of bowel serosa, PNC c/b siPEC.  Patient is stable and doing well post-operatively.      - s/p Magnesium for seizure ppx  - Continue CLD  - Increase ambulation  - Continue motrin, tylenol, oxycodone PRN for pain control.   - F/u AM HELLP labs (8/9)  - c/w Procardia XL to 90mg, will d/c Lab due to low BPs  - Gen surg following; advance diet as per gen surg    Robyn Frankel PGY-3

## 2021-08-09 NOTE — PROGRESS NOTE ADULT - SUBJECTIVE AND OBJECTIVE BOX
OB Postpartum Note: Repeat  Delivery, POD#2    S: 41yo POD#2 s/p repeat c/s (classical) @30wks for IUGR with REDV and lysis of bowel adhesions with repair of bowel serosa by gen surg. Her pain is well controlled. She is tolerating a clear liquid diet. Voiding spontaneously and ambulating without difficulty. Denies N/V. Denies CP/SOB/lightheadedness/dizziness. Denies heavy vaginal bleeding.    O:   Vitals:  Vital Signs Last 24 Hrs  T(C): 37.4 (09 Aug 2021 05:57), Max: 37.7 (08 Aug 2021 21:20)  T(F): 99.4 (09 Aug 2021 05:57), Max: 99.9 (08 Aug 2021 21:20)  HR: 97 (09 Aug 2021 05:57) (88 - 100)  BP: 106/57 (09 Aug 2021 05:57) (89/48 - 127/82)  BP(mean): --  RR: 18 (09 Aug 2021 05:57) (16 - 18)  SpO2: 96% (09 Aug 2021 05:57) (96% - 100%)    MEDICATIONS  (STANDING):  acetaminophen   Tablet .. 975 milliGRAM(s) Oral <User Schedule>  diphtheria/tetanus/pertussis (acellular) Vaccine (ADAcel) 0.5 milliLiter(s) IntraMuscular once  heparin   Injectable 5000 Unit(s) SubCutaneous every 12 hours  ibuprofen  Tablet. 600 milliGRAM(s) Oral every 6 hours  ketorolac   Injectable 30 milliGRAM(s) IV Push every 6 hours  labetalol 200 milliGRAM(s) Oral three times a day  lactated ringers. 1000 milliLiter(s) (75 mL/Hr) IV Continuous <Continuous>  lactated ringers. 1000 milliLiter(s) (50 mL/Hr) IV Continuous <Continuous>  magnesium sulfate Infusion 2 Gm/Hr (50 mL/Hr) IV Continuous <Continuous>  NIFEdipine XL 90 milliGRAM(s) Oral daily  oxytocin Infusion  milliUNIT(s)/Min (1000 mL/Hr) IV Continuous <Continuous>    MEDICATIONS  (PRN):  dexAMETHasone  Injectable 4 milliGRAM(s) IV Push every 6 hours PRN Nausea  diphenhydrAMINE 25 milliGRAM(s) Oral every 6 hours PRN Pruritus  lanolin Ointment 1 Application(s) Topical every 6 hours PRN Sore Nipples  magnesium hydroxide Suspension 30 milliLiter(s) Oral two times a day PRN Constipation  nalbuphine Injectable 2.5 milliGRAM(s) IV Push every 6 hours PRN Pruritus  naloxone Injectable 0.1 milliGRAM(s) IV Push every 3 minutes PRN For ANY of the following changes in patient status:  A. Breaths Per Minute LESS THAN 10, B. Oxygen saturation LESS THAN 90%, C. Sedation score of 6 for Stop After: 4 Times  ondansetron Injectable 4 milliGRAM(s) IV Push every 6 hours PRN Nausea  oxyCODONE    IR 5 milliGRAM(s) Oral every 3 hours PRN Moderate to Severe Pain (4-10)  oxyCODONE    IR 5 milliGRAM(s) Oral once PRN Moderate to Severe Pain (4-10)  simethicone 80 milliGRAM(s) Chew every 4 hours PRN Gas      Labs:  Blood type: A Positive  Rubella IgG: RPR: Negative                          7.5<L>   14.02<H> >-----------< 188    (  @ 07:09 )             23.0<L>                        8.5<L>   18.57<H> >-----------< 193    (  @ 10:58 )             25.5<L>                        8.4<L>   23.86<H> >-----------< 199    (  @ 05:46 )             25.0<L>                        10.2<L>   28.98<H> >-----------< 255    (  @ 22:33 )             31.5<L>                        12.9   19.19<H> >-----------< 309    (  @ 10:52 )             38.2    21 @ 07:09      138  |  106  |  12  ----------------------------<  97  3.8   |  23  |  0.77    21 @ 10:58      134<L>  |  99  |  13  ----------------------------<  103<H>  3.9   |  21<L>  |  0.87    21 @ 05:46      129<L>  |  97<L>  |  14  ----------------------------<  113<H>  3.8   |  20<L>  |  0.86    21 @ 22:33      132<L>  |  97<L>  |  12  ----------------------------<  146<H>  4.2   |  16<L>  |  0.74    21 @ 10:52      136  |  102  |  14  ----------------------------<  105<H>  4.3   |  18<L>  |  0.70        Ca    8.4      09 Aug 2021 07:09  Ca    7.7<L>      08 Aug 2021 10:58  Ca    7.7<L>      08 Aug 2021 05:46  Ca    8.6      07 Aug 2021 22:33  Ca    10.0      07 Aug 2021 10:52  Mg     5.40<H>       Mg     7.30<HH>       Mg     6.00<H>         TPro  5.4<L>  /  Alb  2.9<L>  /  TBili  <0.2  /  DBili  x   /  AST  49<H>  /  ALT  74<H>  /  AlkPhos  84  21 @ 07:09  TPro  5.7<L>  /  Alb  3.2<L>  /  TBili  <0.2  /  DBili  x   /  AST  69<H>  /  ALT  81<H>  /  AlkPhos  101  21 @ 10:58  TPro  5.2<L>  /  Alb  3.0<L>  /  TBili  <0.2  /  DBili  x   /  AST  55<H>  /  ALT  67<H>  /  AlkPhos  97  21 @ 05:46  TPro  6.3  /  Alb  3.4  /  TBili  <0.2  /  DBili  x   /  AST  46<H>  /  ALT  51<H>  /  AlkPhos  116  21 @ 22:33  TPro  7.6  /  Alb  4.1  /  TBili  0.2  /  DBili  x   /  AST  34<H>  /  ALT  41<H>  /  AlkPhos  152<H>  21 @ 10:52          PE:  General: NAD  Abdomen: mildly distended,appropriately tender, Fundus firm, incision c/d/i.  Extremities: SCDs in place, no erythema

## 2021-08-09 NOTE — PROGRESS NOTE ADULT - SUBJECTIVE AND OBJECTIVE BOX
Surgery Progress Note    SUBJECTIVE:  - Patient seen and examined at bedside   - Patient states feeling well  --------------------------------------------------------------------------------------------------  OBJECTIVE:   Physical Exam:  General: AAOx3, NAD, lying comfortably in bed  HEENT: NC/AT  Respiratory: nonlabored breathing  Cardiovascular: RRR, normal S1 and S2, no murmurs or gallops  Abdomen: disntended, soft, appropriately tender  Extremities: WWP, no edema  --------------------------------------------------------------------------------------------------  V/S:  Vital Signs Last 24 Hrs  T(C): 37.4 (09 Aug 2021 05:57), Max: 37.7 (08 Aug 2021 21:20)  T(F): 99.4 (09 Aug 2021 05:57), Max: 99.9 (08 Aug 2021 21:20)  HR: 97 (09 Aug 2021 05:57) (88 - 100)  BP: 106/57 (09 Aug 2021 05:57) (89/48 - 127/82)  BP(mean): --  RR: 18 (09 Aug 2021 05:57) (16 - 18)  SpO2: 96% (09 Aug 2021 05:57) (96% - 100%)    --------------------------------------------------------------------------------------------------  I/Os:    08 Aug 2021 07:01  -  09 Aug 2021 07:00  --------------------------------------------------------  IN:    Lactated Ringers: 150 mL    Magnesium Sulfate: 50 mL  Total IN: 200 mL    OUT:    Voided (mL): 1350 mL  Total OUT: 1350 mL    Total NET: -1150 mL        --------------------------------------------------------------------------------------------------  LABS:                        7.5    14.02 )-----------( 188      ( 09 Aug 2021 07:09 )             23.0     09 Aug 2021 07:09    138    |  106    |  12     ----------------------------<  97     3.8     |  23     |  0.77     Ca    8.4        09 Aug 2021 07:09  Mg     5.40      08 Aug 2021 11:01    TPro  5.4    /  Alb  2.9    /  TBili  <0.2   /  DBili  x      /  AST  49     /  ALT  74     /  AlkPhos  84     09 Aug 2021 07:09    PT/INR - ( 09 Aug 2021 07:09 )   PT: 10.7 sec;   INR: 0.93 ratio         PTT - ( 08 Aug 2021 10:58 )  PTT:28.7 sec  CAPILLARY BLOOD GLUCOSE            LIVER FUNCTIONS - ( 09 Aug 2021 07:09 )  Alb: 2.9 g/dL / Pro: 5.4 g/dL / ALK PHOS: 84 U/L / ALT: 74 U/L / AST: 49 U/L / GGT: x               --------------------------------------------------------------------------------------------------  MEDICATIONS  (STANDING):  acetaminophen   Tablet .. 975 milliGRAM(s) Oral <User Schedule>  diphtheria/tetanus/pertussis (acellular) Vaccine (ADAcel) 0.5 milliLiter(s) IntraMuscular once  heparin   Injectable 5000 Unit(s) SubCutaneous every 12 hours  ibuprofen  Tablet. 600 milliGRAM(s) Oral every 6 hours  ketorolac   Injectable 30 milliGRAM(s) IV Push every 6 hours  labetalol 200 milliGRAM(s) Oral three times a day  lactated ringers. 1000 milliLiter(s) (75 mL/Hr) IV Continuous <Continuous>  lactated ringers. 1000 milliLiter(s) (50 mL/Hr) IV Continuous <Continuous>  magnesium sulfate Infusion 2 Gm/Hr (50 mL/Hr) IV Continuous <Continuous>  NIFEdipine XL 90 milliGRAM(s) Oral daily  oxytocin Infusion  milliUNIT(s)/Min (1000 mL/Hr) IV Continuous <Continuous>    MEDICATIONS  (PRN):  dexAMETHasone  Injectable 4 milliGRAM(s) IV Push every 6 hours PRN Nausea  diphenhydrAMINE 25 milliGRAM(s) Oral every 6 hours PRN Pruritus  lanolin Ointment 1 Application(s) Topical every 6 hours PRN Sore Nipples  magnesium hydroxide Suspension 30 milliLiter(s) Oral two times a day PRN Constipation  nalbuphine Injectable 2.5 milliGRAM(s) IV Push every 6 hours PRN Pruritus  naloxone Injectable 0.1 milliGRAM(s) IV Push every 3 minutes PRN For ANY of the following changes in patient status:  A. Breaths Per Minute LESS THAN 10, B. Oxygen saturation LESS THAN 90%, C. Sedation score of 6 for Stop After: 4 Times  ondansetron Injectable 4 milliGRAM(s) IV Push every 6 hours PRN Nausea  oxyCODONE    IR 5 milliGRAM(s) Oral every 3 hours PRN Moderate to Severe Pain (4-10)  oxyCODONE    IR 5 milliGRAM(s) Oral once PRN Moderate to Severe Pain (4-10)  simethicone 80 milliGRAM(s) Chew every 4 hours PRN Gas    --------------------------------------------------------------------------------------------------     Surgery Progress Note    SUBJECTIVE:  - Patient seen and examined at bedside   - Patient states feeling well, not passing gas or had a BM since surgery  - Patient ambulating  --------------------------------------------------------------------------------------------------  OBJECTIVE:   Physical Exam:  General: AAOx3, NAD, lying comfortably in bed  HEENT: NC/AT  Respiratory: nonlabored breathing  Cardiovascular: RRR, normal S1 and S2, no murmurs or gallops  Abdomen: distended soft, appropriately tender  Extremities: WWP, no edema  --------------------------------------------------------------------------------------------------  V/S:  Vital Signs Last 24 Hrs  T(C): 37.4 (09 Aug 2021 05:57), Max: 37.7 (08 Aug 2021 21:20)  T(F): 99.4 (09 Aug 2021 05:57), Max: 99.9 (08 Aug 2021 21:20)  HR: 97 (09 Aug 2021 05:57) (88 - 100)  BP: 106/57 (09 Aug 2021 05:57) (89/48 - 127/82)  BP(mean): --  RR: 18 (09 Aug 2021 05:57) (16 - 18)  SpO2: 96% (09 Aug 2021 05:57) (96% - 100%)    --------------------------------------------------------------------------------------------------  I/Os:    08 Aug 2021 07:01  -  09 Aug 2021 07:00  --------------------------------------------------------  IN:    Lactated Ringers: 150 mL    Magnesium Sulfate: 50 mL  Total IN: 200 mL    OUT:    Voided (mL): 1350 mL  Total OUT: 1350 mL    Total NET: -1150 mL        --------------------------------------------------------------------------------------------------  LABS:                        7.5    14.02 )-----------( 188      ( 09 Aug 2021 07:09 )             23.0     09 Aug 2021 07:09    138    |  106    |  12     ----------------------------<  97     3.8     |  23     |  0.77     Ca    8.4        09 Aug 2021 07:09  Mg     5.40      08 Aug 2021 11:01    TPro  5.4    /  Alb  2.9    /  TBili  <0.2   /  DBili  x      /  AST  49     /  ALT  74     /  AlkPhos  84     09 Aug 2021 07:09    PT/INR - ( 09 Aug 2021 07:09 )   PT: 10.7 sec;   INR: 0.93 ratio         PTT - ( 08 Aug 2021 10:58 )  PTT:28.7 sec  CAPILLARY BLOOD GLUCOSE            LIVER FUNCTIONS - ( 09 Aug 2021 07:09 )  Alb: 2.9 g/dL / Pro: 5.4 g/dL / ALK PHOS: 84 U/L / ALT: 74 U/L / AST: 49 U/L / GGT: x               --------------------------------------------------------------------------------------------------  MEDICATIONS  (STANDING):  acetaminophen   Tablet .. 975 milliGRAM(s) Oral <User Schedule>  diphtheria/tetanus/pertussis (acellular) Vaccine (ADAcel) 0.5 milliLiter(s) IntraMuscular once  heparin   Injectable 5000 Unit(s) SubCutaneous every 12 hours  ibuprofen  Tablet. 600 milliGRAM(s) Oral every 6 hours  ketorolac   Injectable 30 milliGRAM(s) IV Push every 6 hours  labetalol 200 milliGRAM(s) Oral three times a day  lactated ringers. 1000 milliLiter(s) (75 mL/Hr) IV Continuous <Continuous>  lactated ringers. 1000 milliLiter(s) (50 mL/Hr) IV Continuous <Continuous>  magnesium sulfate Infusion 2 Gm/Hr (50 mL/Hr) IV Continuous <Continuous>  NIFEdipine XL 90 milliGRAM(s) Oral daily  oxytocin Infusion  milliUNIT(s)/Min (1000 mL/Hr) IV Continuous <Continuous>    MEDICATIONS  (PRN):  dexAMETHasone  Injectable 4 milliGRAM(s) IV Push every 6 hours PRN Nausea  diphenhydrAMINE 25 milliGRAM(s) Oral every 6 hours PRN Pruritus  lanolin Ointment 1 Application(s) Topical every 6 hours PRN Sore Nipples  magnesium hydroxide Suspension 30 milliLiter(s) Oral two times a day PRN Constipation  nalbuphine Injectable 2.5 milliGRAM(s) IV Push every 6 hours PRN Pruritus  naloxone Injectable 0.1 milliGRAM(s) IV Push every 3 minutes PRN For ANY of the following changes in patient status:  A. Breaths Per Minute LESS THAN 10, B. Oxygen saturation LESS THAN 90%, C. Sedation score of 6 for Stop After: 4 Times  ondansetron Injectable 4 milliGRAM(s) IV Push every 6 hours PRN Nausea  oxyCODONE    IR 5 milliGRAM(s) Oral every 3 hours PRN Moderate to Severe Pain (4-10)  oxyCODONE    IR 5 milliGRAM(s) Oral once PRN Moderate to Severe Pain (4-10)  simethicone 80 milliGRAM(s) Chew every 4 hours PRN Gas    --------------------------------------------------------------------------------------------------

## 2021-08-10 RX ORDER — IBUPROFEN 200 MG
600 TABLET ORAL EVERY 6 HOURS
Refills: 0 | Status: DISCONTINUED | OUTPATIENT
Start: 2021-08-10 | End: 2021-08-14

## 2021-08-10 RX ADMIN — Medication 325 MILLIGRAM(S): at 10:23

## 2021-08-10 RX ADMIN — Medication 600 MILLIGRAM(S): at 10:29

## 2021-08-10 RX ADMIN — Medication 500 MILLIGRAM(S): at 18:20

## 2021-08-10 RX ADMIN — HEPARIN SODIUM 5000 UNIT(S): 5000 INJECTION INTRAVENOUS; SUBCUTANEOUS at 10:24

## 2021-08-10 RX ADMIN — HEPARIN SODIUM 5000 UNIT(S): 5000 INJECTION INTRAVENOUS; SUBCUTANEOUS at 22:43

## 2021-08-10 RX ADMIN — Medication 600 MILLIGRAM(S): at 01:17

## 2021-08-10 RX ADMIN — Medication 600 MILLIGRAM(S): at 18:19

## 2021-08-10 RX ADMIN — Medication 600 MILLIGRAM(S): at 02:15

## 2021-08-10 RX ADMIN — Medication 90 MILLIGRAM(S): at 18:20

## 2021-08-10 NOTE — PROGRESS NOTE ADULT - ASSESSMENT
A/P: 39yo POD#3 s/p repeat c/s (classical) and repair of bowel serosa, PNC c/b siPEC. Awaiting full return of bowel function and advancing diet slowly due to bowel adhesions at time of c/s with serosal repair. Patient is stable and doing well post-operatively, with return of flatus last night.      - s/p Magnesium for seizure ppx  - Continue full liquid diet; advance slowly. Diet per gen surg  - Increase ambulation  - Continue motrin, tylenol, oxycodone PRN for pain control.   - Monitor BPs  - c/w Procardia XL to 90mg for BP control, will d/c Lab due to low BPs  - Cardio OB email to be sent on d/c    Robyn Frankel PGY-3

## 2021-08-10 NOTE — PROGRESS NOTE ADULT - SUBJECTIVE AND OBJECTIVE BOX
OB Postpartum Note: Repeat  Delivery, POD#3    S: 39yo POD#3 s/p repeat c/s (classical) @30wks for IUGR with REDV and lysis of bowel adhesions with repair of bowel serosa by gen surg. Patient passed multiple episodes of flatus yesterday. Has not yet had bowel movement. Her pain is well controlled. She is tolerating a full liquid diet. Voiding spontaneously and ambulating without difficulty. Denies N/V. Denies CP/SOB/lightheadedness/dizziness. Denies heavy vaginal bleeding.    O:   Vitals:  Vital Signs Last 24 Hrs  T(C): 37.2 (10 Aug 2021 05:41), Max: 37.6 (09 Aug 2021 22:32)  T(F): 99 (10 Aug 2021 05:41), Max: 99.6 (09 Aug 2021 22:32)  HR: 96 (10 Aug 2021 05:41) (96 - 108)  BP: 128/81 (10 Aug 2021 05:41) (112/65 - 140/86)  BP(mean): --  RR: 17 (10 Aug 2021 05:41) (17 - 18)  SpO2: 98% (10 Aug 2021 05:41) (97% - 100%)    MEDICATIONS  (STANDING):  acetaminophen   Tablet .. 975 milliGRAM(s) Oral <User Schedule>  ascorbic acid 500 milliGRAM(s) Oral daily  diphtheria/tetanus/pertussis (acellular) Vaccine (ADAcel) 0.5 milliLiter(s) IntraMuscular once  ferrous    sulfate 325 milliGRAM(s) Oral two times a day  heparin   Injectable 5000 Unit(s) SubCutaneous every 12 hours  ibuprofen  Tablet. 600 milliGRAM(s) Oral every 6 hours  lactated ringers. 1000 milliLiter(s) (50 mL/Hr) IV Continuous <Continuous>  magnesium sulfate Infusion 2 Gm/Hr (50 mL/Hr) IV Continuous <Continuous>  NIFEdipine XL 90 milliGRAM(s) Oral daily    MEDICATIONS  (PRN):  dexAMETHasone  Injectable 4 milliGRAM(s) IV Push every 6 hours PRN Nausea  diphenhydrAMINE 25 milliGRAM(s) Oral every 6 hours PRN Pruritus  lanolin Ointment 1 Application(s) Topical every 6 hours PRN Sore Nipples  magnesium hydroxide Suspension 30 milliLiter(s) Oral two times a day PRN Constipation  nalbuphine Injectable 2.5 milliGRAM(s) IV Push every 6 hours PRN Pruritus  naloxone Injectable 0.1 milliGRAM(s) IV Push every 3 minutes PRN For ANY of the following changes in patient status:  A. Breaths Per Minute LESS THAN 10, B. Oxygen saturation LESS THAN 90%, C. Sedation score of 6 for Stop After: 4 Times  ondansetron Injectable 4 milliGRAM(s) IV Push every 6 hours PRN Nausea  oxyCODONE    IR 5 milliGRAM(s) Oral every 3 hours PRN Moderate to Severe Pain (4-10)  oxyCODONE    IR 5 milliGRAM(s) Oral once PRN Moderate to Severe Pain (4-10)  simethicone 80 milliGRAM(s) Chew every 4 hours PRN Gas      Labs:  Blood type: A Positive  Rubella IgG: RPR: Negative                          7.5<L>   14.02<H> >-----------< 188    (  @ 07:09 )             23.0<L>                        8.5<L>   18.57<H> >-----------< 193    (  @ 10:58 )             25.5<L>                        8.4<L>   23.86<H> >-----------< 199    (  @ 05:46 )             25.0<L>                        10.2<L>   28.98<H> >-----------< 255    (  @ 22:33 )             31.5<L>                        12.9   19.19<H> >-----------< 309    (  @ 10:52 )             38.2    21 @ 07:09      138  |  106  |  12  ----------------------------<  97  3.8   |  23  |  0.77    21 @ 10:58      134<L>  |  99  |  13  ----------------------------<  103<H>  3.9   |  21<L>  |  0.87    21 @ 05:46      129<L>  |  97<L>  |  14  ----------------------------<  113<H>  3.8   |  20<L>  |  0.86    21 @ 22:33      132<L>  |  97<L>  |  12  ----------------------------<  146<H>  4.2   |  16<L>  |  0.74    21 @ 10:52      136  |  102  |  14  ----------------------------<  105<H>  4.3   |  18<L>  |  0.70        Ca    8.4      09 Aug 2021 07:09  Ca    7.7<L>      08 Aug 2021 10:58  Ca    7.7<L>      08 Aug 2021 05:46  Ca    8.6      07 Aug 2021 22:33  Ca    10.0      07 Aug 2021 10:52  Mg     5.40<H>       Mg     7.30<HH>       Mg     6.00<H>         TPro  5.4<L>  /  Alb  2.9<L>  /  TBili  <0.2  /  DBili  x   /  AST  49<H>  /  ALT  74<H>  /  AlkPhos  84  21 @ 07:09  TPro  5.7<L>  /  Alb  3.2<L>  /  TBili  <0.2  /  DBili  x   /  AST  69<H>  /  ALT  81<H>  /  AlkPhos  101  21 @ 10:58  TPro  5.2<L>  /  Alb  3.0<L>  /  TBili  <0.2  /  DBili  x   /  AST  55<H>  /  ALT  67<H>  /  AlkPhos  97  21 @ 05:46  TPro  6.3  /  Alb  3.4  /  TBili  <0.2  /  DBili  x   /  AST  46<H>  /  ALT  51<H>  /  AlkPhos  116  21 @ 22:33  TPro  7.6  /  Alb  4.1  /  TBili  0.2  /  DBili  x   /  AST  34<H>  /  ALT  41<H>  /  AlkPhos  152<H>  21 @ 10:52          PE:  General: NAD  Abdomen: mildly distended,appropriately tender, Fundus firm, incision c/d/i.  Extremities: SCDs in place, no erythema

## 2021-08-10 NOTE — PROGRESS NOTE ADULT - SUBJECTIVE AND OBJECTIVE BOX
Surgery Progress Note    SUBJECTIVE:  - Patient seen and examined at bedside   - Patient states feeling well, passing gas multiple times, no BM yet  - Tolerating full liquid diet with no nausea  - Patient ambulating  --------------------------------------------------------------------------------------------------  OBJECTIVE:   Physical Exam:  General: AAOx3, NAD, lying comfortably in bed  HEENT: NC/AT  Respiratory: nonlabored breathing  Cardiovascular: RRR, normal S1 and S2, no murmurs or gallops  Abdomen: distended soft, appropriately tender  Extremities: WWP, no edema  --------------------------------------------------------------------------------------------------  V/S:  Vital Signs Last 24 Hrs  T(C): 37.3 (10 Aug 2021 10:28), Max: 37.6 (09 Aug 2021 22:32)  T(F): 99.2 (10 Aug 2021 10:28), Max: 99.6 (09 Aug 2021 22:32)  HR: 104 (10 Aug 2021 10:28) (96 - 108)  BP: 127/87 (10 Aug 2021 10:28) (125/70 - 140/86)  BP(mean): --  RR: 16 (10 Aug 2021 10:28) (16 - 18)  SpO2: 100% (10 Aug 2021 10:28) (97% - 100%)    --------------------------------------------------------------------------------------------------  I/Os:    --------------------------------------------------------------------------------------------------  LABS:                        7.5    14.02 )-----------( 188      ( 09 Aug 2021 07:09 )             23.0     09 Aug 2021 07:09    138    |  106    |  12     ----------------------------<  97     3.8     |  23     |  0.77     Ca    8.4        09 Aug 2021 07:09  Mg     5.40      08 Aug 2021 11:01    TPro  5.4    /  Alb  2.9    /  TBili  <0.2   /  DBili  x      /  AST  49     /  ALT  74     /  AlkPhos  84     09 Aug 2021 07:09    PT/INR - ( 09 Aug 2021 07:09 )   PT: 10.7 sec;   INR: 0.93 ratio         PTT - ( 08 Aug 2021 10:58 )  PTT:28.7 sec  CAPILLARY BLOOD GLUCOSE            LIVER FUNCTIONS - ( 09 Aug 2021 07:09 )  Alb: 2.9 g/dL / Pro: 5.4 g/dL / ALK PHOS: 84 U/L / ALT: 74 U/L / AST: 49 U/L / GGT: x               --------------------------------------------------------------------------------------------------  MEDICATIONS  (STANDING):  acetaminophen   Tablet .. 975 milliGRAM(s) Oral <User Schedule>  ascorbic acid 500 milliGRAM(s) Oral daily  diphtheria/tetanus/pertussis (acellular) Vaccine (ADAcel) 0.5 milliLiter(s) IntraMuscular once  ferrous    sulfate 325 milliGRAM(s) Oral two times a day  heparin   Injectable 5000 Unit(s) SubCutaneous every 12 hours  ibuprofen  Tablet. 600 milliGRAM(s) Oral every 6 hours  lactated ringers. 1000 milliLiter(s) (50 mL/Hr) IV Continuous <Continuous>  magnesium sulfate Infusion 2 Gm/Hr (50 mL/Hr) IV Continuous <Continuous>  NIFEdipine XL 90 milliGRAM(s) Oral daily    MEDICATIONS  (PRN):  dexAMETHasone  Injectable 4 milliGRAM(s) IV Push every 6 hours PRN Nausea  diphenhydrAMINE 25 milliGRAM(s) Oral every 6 hours PRN Pruritus  lanolin Ointment 1 Application(s) Topical every 6 hours PRN Sore Nipples  magnesium hydroxide Suspension 30 milliLiter(s) Oral two times a day PRN Constipation  nalbuphine Injectable 2.5 milliGRAM(s) IV Push every 6 hours PRN Pruritus  naloxone Injectable 0.1 milliGRAM(s) IV Push every 3 minutes PRN For ANY of the following changes in patient status:  A. Breaths Per Minute LESS THAN 10, B. Oxygen saturation LESS THAN 90%, C. Sedation score of 6 for Stop After: 4 Times  ondansetron Injectable 4 milliGRAM(s) IV Push every 6 hours PRN Nausea  oxyCODONE    IR 5 milliGRAM(s) Oral every 3 hours PRN Moderate to Severe Pain (4-10)  oxyCODONE    IR 5 milliGRAM(s) Oral once PRN Moderate to Severe Pain (4-10)  simethicone 80 milliGRAM(s) Chew every 4 hours PRN Gas    --------------------------------------------------------------------------------------------------

## 2021-08-10 NOTE — PROGRESS NOTE ADULT - ASSESSMENT
40y F w/ pmhx HTN, now POD#1 s/p repeat C/s (classical) @30wk for IUGR with REDV, and lysis of bowel adhesions with repair of bowel serosa by general surgery. Patient is recovering well on floor.     PLAN:  - Advance diet as tolerated  - c/w ambulation to encourage bowel function  - Care per primary team    PARAS Espinal, PGY-2    B Team Surgery  [id number]

## 2021-08-11 LAB
BASOPHILS # BLD AUTO: 0.07 K/UL — SIGNIFICANT CHANGE UP (ref 0–0.2)
BASOPHILS NFR BLD AUTO: 0.5 % — SIGNIFICANT CHANGE UP (ref 0–2)
EOSINOPHIL # BLD AUTO: 0.28 K/UL — SIGNIFICANT CHANGE UP (ref 0–0.5)
EOSINOPHIL NFR BLD AUTO: 2 % — SIGNIFICANT CHANGE UP (ref 0–6)
HCT VFR BLD CALC: 25 % — LOW (ref 34.5–45)
HGB BLD-MCNC: 8.2 G/DL — LOW (ref 11.5–15.5)
IANC: 9.39 K/UL — HIGH (ref 1.5–8.5)
IMM GRANULOCYTES NFR BLD AUTO: 3.7 % — HIGH (ref 0–1.5)
LYMPHOCYTES # BLD AUTO: 19 % — SIGNIFICANT CHANGE UP (ref 13–44)
LYMPHOCYTES # BLD AUTO: 2.62 K/UL — SIGNIFICANT CHANGE UP (ref 1–3.3)
MCHC RBC-ENTMCNC: 31.1 PG — SIGNIFICANT CHANGE UP (ref 27–34)
MCHC RBC-ENTMCNC: 32.8 GM/DL — SIGNIFICANT CHANGE UP (ref 32–36)
MCV RBC AUTO: 94.7 FL — SIGNIFICANT CHANGE UP (ref 80–100)
MONOCYTES # BLD AUTO: 0.95 K/UL — HIGH (ref 0–0.9)
MONOCYTES NFR BLD AUTO: 6.9 % — SIGNIFICANT CHANGE UP (ref 2–14)
NEUTROPHILS # BLD AUTO: 9.39 K/UL — HIGH (ref 1.8–7.4)
NEUTROPHILS NFR BLD AUTO: 67.9 % — SIGNIFICANT CHANGE UP (ref 43–77)
NRBC # BLD: 0 /100 WBCS — SIGNIFICANT CHANGE UP
NRBC # FLD: 0.12 K/UL — HIGH
PLATELET # BLD AUTO: 289 K/UL — SIGNIFICANT CHANGE UP (ref 150–400)
RBC # BLD: 2.64 M/UL — LOW (ref 3.8–5.2)
RBC # FLD: 13.2 % — SIGNIFICANT CHANGE UP (ref 10.3–14.5)
WBC # BLD: 13.82 K/UL — HIGH (ref 3.8–10.5)
WBC # FLD AUTO: 13.82 K/UL — HIGH (ref 3.8–10.5)

## 2021-08-11 PROCEDURE — 74177 CT ABD & PELVIS W/CONTRAST: CPT | Mod: 26

## 2021-08-11 RX ADMIN — HEPARIN SODIUM 5000 UNIT(S): 5000 INJECTION INTRAVENOUS; SUBCUTANEOUS at 21:46

## 2021-08-11 RX ADMIN — Medication 600 MILLIGRAM(S): at 06:42

## 2021-08-11 RX ADMIN — Medication 500 MILLIGRAM(S): at 10:31

## 2021-08-11 RX ADMIN — Medication 600 MILLIGRAM(S): at 00:12

## 2021-08-11 RX ADMIN — Medication 600 MILLIGRAM(S): at 22:40

## 2021-08-11 RX ADMIN — Medication 600 MILLIGRAM(S): at 12:36

## 2021-08-11 RX ADMIN — Medication 325 MILLIGRAM(S): at 10:34

## 2021-08-11 RX ADMIN — HEPARIN SODIUM 5000 UNIT(S): 5000 INJECTION INTRAVENOUS; SUBCUTANEOUS at 10:32

## 2021-08-11 RX ADMIN — Medication 90 MILLIGRAM(S): at 18:17

## 2021-08-11 RX ADMIN — Medication 600 MILLIGRAM(S): at 21:46

## 2021-08-11 RX ADMIN — Medication 600 MILLIGRAM(S): at 07:30

## 2021-08-11 RX ADMIN — Medication 325 MILLIGRAM(S): at 21:46

## 2021-08-11 RX ADMIN — Medication 600 MILLIGRAM(S): at 01:00

## 2021-08-11 NOTE — PROGRESS NOTE ADULT - ASSESSMENT
A/P: 41yo POD#4 s/p repeat c/s (classical) and repair of bowel serosa, PNC c/b siPEC. Awaiting full return of bowel function and advancing diet slowly due to bowel adhesions at time of c/s with serosal repair. Patient is stable and doing well post-operatively, with return of flatus, tolerating regular diet.    - Continue regular diet  - Appreciate gen surg recs: may consider CTAP given abdominal distention and tachycardia  - f/u AM CBC  - Increase ambulation  - Continue motrin, tylenol, oxycodone PRN for pain control.   - s/p Magnesium for seizure ppx  - Monitor BPs  - c/w Procardia XL to 90mg for BP control, will d/c Lab due to low BPs  - Cardio OB email to be sent on d/c    Robyn Frankel PGY-3

## 2021-08-11 NOTE — PROGRESS NOTE ADULT - SUBJECTIVE AND OBJECTIVE BOX
OB Postpartum Note: Repeat  Delivery, POD#4    S: 41yo POD#4 s/p repeat c/s (classical) @30wks for IUGR with REDV and lysis of bowel adhesions with repair of bowel serosa by gen surg. Patient advanced to regular diet yesterday, which she tolerated well. Denies nausea/vomiting. She is passing flatus. Her pain is well controlled. Voiding spontaneously and ambulating without difficulty. Denies CP/SOB/lightheadedness/dizziness. Denies heavy vaginal bleeding.    O:   Vitals:  Vital Signs Last 24 Hrs  T(C): 37 (11 Aug 2021 05:36), Max: 37.7 (10 Aug 2021 22:27)  T(F): 98.6 (11 Aug 2021 05:36), Max: 99.9 (10 Aug 2021 22:27)  HR: 100 (11 Aug 2021 05:36) (100 - 112)  BP: 124/84 (11 Aug 2021 05:36) (124/84 - 137/83)  BP(mean): --  RR: 17 (11 Aug 2021 05:36) (16 - 18)  SpO2: 99% (11 Aug 2021 05:36) (97% - 100%)    MEDICATIONS  (STANDING):  acetaminophen   Tablet .. 975 milliGRAM(s) Oral <User Schedule>  ascorbic acid 500 milliGRAM(s) Oral daily  diphtheria/tetanus/pertussis (acellular) Vaccine (ADAcel) 0.5 milliLiter(s) IntraMuscular once  ferrous    sulfate 325 milliGRAM(s) Oral two times a day  heparin   Injectable 5000 Unit(s) SubCutaneous every 12 hours  ibuprofen  Tablet. 600 milliGRAM(s) Oral every 6 hours  lactated ringers. 1000 milliLiter(s) (50 mL/Hr) IV Continuous <Continuous>  magnesium sulfate Infusion 2 Gm/Hr (50 mL/Hr) IV Continuous <Continuous>  NIFEdipine XL 90 milliGRAM(s) Oral daily    MEDICATIONS  (PRN):  dexAMETHasone  Injectable 4 milliGRAM(s) IV Push every 6 hours PRN Nausea  diphenhydrAMINE 25 milliGRAM(s) Oral every 6 hours PRN Pruritus  lanolin Ointment 1 Application(s) Topical every 6 hours PRN Sore Nipples  magnesium hydroxide Suspension 30 milliLiter(s) Oral two times a day PRN Constipation  nalbuphine Injectable 2.5 milliGRAM(s) IV Push every 6 hours PRN Pruritus  naloxone Injectable 0.1 milliGRAM(s) IV Push every 3 minutes PRN For ANY of the following changes in patient status:  A. Breaths Per Minute LESS THAN 10, B. Oxygen saturation LESS THAN 90%, C. Sedation score of 6 for Stop After: 4 Times  ondansetron Injectable 4 milliGRAM(s) IV Push every 6 hours PRN Nausea  oxyCODONE    IR 5 milliGRAM(s) Oral every 3 hours PRN Moderate to Severe Pain (4-10)  oxyCODONE    IR 5 milliGRAM(s) Oral once PRN Moderate to Severe Pain (4-10)  simethicone 80 milliGRAM(s) Chew every 4 hours PRN Gas      Labs:  Blood type: A Positive  Rubella IgG: RPR: Negative                          8.2<L>   13.82<H> >-----------< 289    (  @ 06:49 )             25.0<L>                        7.5<L>   14.02<H> >-----------< 188    (  @ 07:09 )             23.0<L>                        8.5<L>   18.57<H> >-----------< 193    (  @ 10:58 )             25.5<L>    21 @ 07:09      138  |  106  |  12  ----------------------------<  97  3.8   |  23  |  0.77    21 @ 10:58      134<L>  |  99  |  13  ----------------------------<  103<H>  3.9   |  21<L>  |  0.87        Ca    8.4      09 Aug 2021 07:09  Ca    7.7<L>      08 Aug 2021 10:58  Mg     5.40<H>         TPro  5.4<L>  /  Alb  2.9<L>  /  TBili  <0.2  /  DBili  x   /  AST  49<H>  /  ALT  74<H>  /  AlkPhos  84  21 @ 07:09  TPro  5.7<L>  /  Alb  3.2<L>  /  TBili  <0.2  /  DBili  x   /  AST  69<H>  /  ALT  81<H>  /  AlkPhos  101  21 @ 10:58          PE:  General: NAD  Abdomen: mildly distended, tympanic, appropriately tender, Fundus firm, incision c/d/i.  Extremities: SCDs in place, no erythema

## 2021-08-11 NOTE — PROGRESS NOTE ADULT - ASSESSMENT
40y F w/ pmhx HTN, now POD#4 s/p repeat C/s (classical) @30wk for IUGR with REDV, and lysis of bowel adhesions with repair of bowel serosa by general surgery. Patient is recovering well on floor.     PLAN:  - Given patient's low grade tachycardia and persistent abdominal distention, consider CTAP w/ IV contrast to ensure no pathologic process.  - c/w ambulation to encourage bowel function  - Care per primary team    PARAS Espinal, PGY-2  [facility]  B Team Surgery  [id number]

## 2021-08-11 NOTE — PROGRESS NOTE ADULT - SUBJECTIVE AND OBJECTIVE BOX
Surgery Progress Note    SUBJECTIVE:  - Patient seen and examined at bedside   - Patient states feeling well, passing gas, no BM since surgery  --------------------------------------------------------------------------------------------------  OBJECTIVE:   Physical Exam:  General: AAOx3, NAD, lying comfortably in bed  HEENT: NC/AT  Respiratory: nonlabored breathing  Cardiovascular: RRR, normal S1 and S2, no murmurs or gallops  Abdomen: distended, soft, non-tender  Extremities: WWP, no edema  --------------------------------------------------------------------------------------------------  V/S:  Vital Signs Last 24 Hrs  T(C): 37 (11 Aug 2021 05:36), Max: 37.7 (10 Aug 2021 22:27)  T(F): 98.6 (11 Aug 2021 05:36), Max: 99.9 (10 Aug 2021 22:27)  HR: 100 (11 Aug 2021 05:36) (100 - 112)  BP: 124/84 (11 Aug 2021 05:36) (124/84 - 137/83)  BP(mean): --  RR: 17 (11 Aug 2021 05:36) (16 - 18)  SpO2: 99% (11 Aug 2021 05:36) (97% - 100%)    --------------------------------------------------------------------------------------------------  I/Os:    10 Aug 2021 07:01  -  11 Aug 2021 07:00  --------------------------------------------------------  IN:  Total IN: 0 mL    OUT:    Voided (mL): 500 mL  Total OUT: 500 mL    Total NET: -500 mL        --------------------------------------------------------------------------------------------------  LABS:                        8.2    13.82 )-----------( 289      ( 11 Aug 2021 06:49 )             25.0             CAPILLARY BLOOD GLUCOSE                  --------------------------------------------------------------------------------------------------  MEDICATIONS  (STANDING):  acetaminophen   Tablet .. 975 milliGRAM(s) Oral <User Schedule>  ascorbic acid 500 milliGRAM(s) Oral daily  diphtheria/tetanus/pertussis (acellular) Vaccine (ADAcel) 0.5 milliLiter(s) IntraMuscular once  ferrous    sulfate 325 milliGRAM(s) Oral two times a day  heparin   Injectable 5000 Unit(s) SubCutaneous every 12 hours  ibuprofen  Tablet. 600 milliGRAM(s) Oral every 6 hours  lactated ringers. 1000 milliLiter(s) (50 mL/Hr) IV Continuous <Continuous>  magnesium sulfate Infusion 2 Gm/Hr (50 mL/Hr) IV Continuous <Continuous>  NIFEdipine XL 90 milliGRAM(s) Oral daily    MEDICATIONS  (PRN):  dexAMETHasone  Injectable 4 milliGRAM(s) IV Push every 6 hours PRN Nausea  diphenhydrAMINE 25 milliGRAM(s) Oral every 6 hours PRN Pruritus  lanolin Ointment 1 Application(s) Topical every 6 hours PRN Sore Nipples  magnesium hydroxide Suspension 30 milliLiter(s) Oral two times a day PRN Constipation  nalbuphine Injectable 2.5 milliGRAM(s) IV Push every 6 hours PRN Pruritus  naloxone Injectable 0.1 milliGRAM(s) IV Push every 3 minutes PRN For ANY of the following changes in patient status:  A. Breaths Per Minute LESS THAN 10, B. Oxygen saturation LESS THAN 90%, C. Sedation score of 6 for Stop After: 4 Times  ondansetron Injectable 4 milliGRAM(s) IV Push every 6 hours PRN Nausea  oxyCODONE    IR 5 milliGRAM(s) Oral every 3 hours PRN Moderate to Severe Pain (4-10)  oxyCODONE    IR 5 milliGRAM(s) Oral once PRN Moderate to Severe Pain (4-10)  simethicone 80 milliGRAM(s) Chew every 4 hours PRN Gas    --------------------------------------------------------------------------------------------------

## 2021-08-11 NOTE — CHART NOTE - NSCHARTNOTEFT_GEN_A_CORE
Called by General surgery resident; per Dr. Baxter would recommend CT abd/pelvis with IV contrast this morning to evaluate for any pathology, given low grade tachycardia.     d/w Dr Gardiner who agrees with above    -Order for CT placed and discussed with patient    Lina Mchugh FNP-BC

## 2021-08-12 LAB
BASOPHILS # BLD AUTO: 0.11 K/UL — SIGNIFICANT CHANGE UP (ref 0–0.2)
BASOPHILS NFR BLD AUTO: 0.8 % — SIGNIFICANT CHANGE UP (ref 0–2)
EOSINOPHIL # BLD AUTO: 0.34 K/UL — SIGNIFICANT CHANGE UP (ref 0–0.5)
EOSINOPHIL NFR BLD AUTO: 2.4 % — SIGNIFICANT CHANGE UP (ref 0–6)
HCT VFR BLD CALC: 26.3 % — LOW (ref 34.5–45)
HGB BLD-MCNC: 8.5 G/DL — LOW (ref 11.5–15.5)
IANC: 8.5 K/UL — SIGNIFICANT CHANGE UP (ref 1.5–8.5)
IMM GRANULOCYTES NFR BLD AUTO: 4.1 % — HIGH (ref 0–1.5)
LYMPHOCYTES # BLD AUTO: 24.4 % — SIGNIFICANT CHANGE UP (ref 13–44)
LYMPHOCYTES # BLD AUTO: 3.4 K/UL — HIGH (ref 1–3.3)
MCHC RBC-ENTMCNC: 31.4 PG — SIGNIFICANT CHANGE UP (ref 27–34)
MCHC RBC-ENTMCNC: 32.3 GM/DL — SIGNIFICANT CHANGE UP (ref 32–36)
MCV RBC AUTO: 97 FL — SIGNIFICANT CHANGE UP (ref 80–100)
MONOCYTES # BLD AUTO: 1.03 K/UL — HIGH (ref 0–0.9)
MONOCYTES NFR BLD AUTO: 7.4 % — SIGNIFICANT CHANGE UP (ref 2–14)
NEUTROPHILS # BLD AUTO: 8.5 K/UL — HIGH (ref 1.8–7.4)
NEUTROPHILS NFR BLD AUTO: 60.9 % — SIGNIFICANT CHANGE UP (ref 43–77)
NRBC # BLD: 0 /100 WBCS — SIGNIFICANT CHANGE UP
NRBC # FLD: 0.13 K/UL — HIGH
PLATELET # BLD AUTO: 303 K/UL — SIGNIFICANT CHANGE UP (ref 150–400)
RBC # BLD: 2.71 M/UL — LOW (ref 3.8–5.2)
RBC # FLD: 13.2 % — SIGNIFICANT CHANGE UP (ref 10.3–14.5)
WBC # BLD: 13.95 K/UL — HIGH (ref 3.8–10.5)
WBC # FLD AUTO: 13.95 K/UL — HIGH (ref 3.8–10.5)

## 2021-08-12 RX ORDER — PRAMOXINE HYDROCHLORIDE 150 MG/15G
1 AEROSOL, FOAM RECTAL EVERY 4 HOURS
Refills: 0 | Status: DISCONTINUED | OUTPATIENT
Start: 2021-08-12 | End: 2021-08-14

## 2021-08-12 RX ORDER — HYDROCORTISONE 1 %
1 OINTMENT (GRAM) TOPICAL EVERY 6 HOURS
Refills: 0 | Status: DISCONTINUED | OUTPATIENT
Start: 2021-08-12 | End: 2021-08-14

## 2021-08-12 RX ORDER — POLYETHYLENE GLYCOL 3350 17 G/17G
17 POWDER, FOR SOLUTION ORAL DAILY
Refills: 0 | Status: DISCONTINUED | OUTPATIENT
Start: 2021-08-12 | End: 2021-08-14

## 2021-08-12 RX ORDER — AER TRAVELER 0.5 G/1
1 SOLUTION RECTAL; TOPICAL
Refills: 0 | Status: DISCONTINUED | OUTPATIENT
Start: 2021-08-12 | End: 2021-08-14

## 2021-08-12 RX ORDER — HYDROCORTISONE 1 %
1 OINTMENT (GRAM) TOPICAL DAILY
Refills: 0 | Status: DISCONTINUED | OUTPATIENT
Start: 2021-08-12 | End: 2021-08-12

## 2021-08-12 RX ADMIN — Medication 90 MILLIGRAM(S): at 18:19

## 2021-08-12 RX ADMIN — Medication 500 MILLIGRAM(S): at 11:10

## 2021-08-12 RX ADMIN — Medication 1 APPLICATION(S): at 18:20

## 2021-08-12 RX ADMIN — AER TRAVELER 1 APPLICATION(S): 0.5 SOLUTION RECTAL; TOPICAL at 18:18

## 2021-08-12 RX ADMIN — POLYETHYLENE GLYCOL 3350 17 GRAM(S): 17 POWDER, FOR SOLUTION ORAL at 11:15

## 2021-08-12 RX ADMIN — Medication 600 MILLIGRAM(S): at 18:20

## 2021-08-12 RX ADMIN — Medication 325 MILLIGRAM(S): at 11:10

## 2021-08-12 NOTE — CHART NOTE - NSCHARTNOTEFT_GEN_A_CORE
Spoke to Gen Surg team regarding bowel regimen for patient s/p C/S; recommends miralax.     Lina Mchugh FNP-BC

## 2021-08-12 NOTE — CHART NOTE - NSCHARTNOTESELECT_GEN_ALL_CORE
Event Note
Event Note
R3 OB Chart Note
bowel regimen/Event Note
Event Note
Event Note
fetal heart tracing/Event Note
recs from gen sx/Event Note

## 2021-08-12 NOTE — PROGRESS NOTE ADULT - SUBJECTIVE AND OBJECTIVE BOX
OB Postpartum Note: Repeat  Delivery, POD#5    S: 41yo POD#5 s/p repeat c/s (classical) @30wks for IUGR with REDV and lysis of bowel adhesions with repair of bowel serosa by gen surg. Continues to be low grade tachycardia, however patient asymptomatic. She tolerated regular diet. Passed flatus x3 yesterday, no BM. Her pain is well controlled. Voiding spontaneously and ambulating without difficulty. Denies N/V. Denies CP/SOB/lightheadedness/dizziness. Denies heavy vaginal bleeding.    O:   Vitals:  Vital Signs Last 24 Hrs  T(C): 37 (12 Aug 2021 06:16), Max: 37.7 (11 Aug 2021 21:52)  T(F): 98.6 (12 Aug 2021 06:16), Max: 99.8 (11 Aug 2021 21:52)  HR: 100 (12 Aug 2021 06:16) (100 - 110)  BP: 117/84 (12 Aug 2021 06:16) (117/84 - 134/92)  BP(mean): --  RR: 17 (12 Aug 2021 06:16) (12 - 17)  SpO2: 97% (12 Aug 2021 06:16) (97% - 100%)    MEDICATIONS  (STANDING):  acetaminophen   Tablet .. 975 milliGRAM(s) Oral <User Schedule>  ascorbic acid 500 milliGRAM(s) Oral daily  diphtheria/tetanus/pertussis (acellular) Vaccine (ADAcel) 0.5 milliLiter(s) IntraMuscular once  ferrous    sulfate 325 milliGRAM(s) Oral two times a day  heparin   Injectable 5000 Unit(s) SubCutaneous every 12 hours  ibuprofen  Tablet. 600 milliGRAM(s) Oral every 6 hours  NIFEdipine XL 90 milliGRAM(s) Oral daily    MEDICATIONS  (PRN):  dexAMETHasone  Injectable 4 milliGRAM(s) IV Push every 6 hours PRN Nausea  diphenhydrAMINE 25 milliGRAM(s) Oral every 6 hours PRN Pruritus  lanolin Ointment 1 Application(s) Topical every 6 hours PRN Sore Nipples  magnesium hydroxide Suspension 30 milliLiter(s) Oral two times a day PRN Constipation  nalbuphine Injectable 2.5 milliGRAM(s) IV Push every 6 hours PRN Pruritus  naloxone Injectable 0.1 milliGRAM(s) IV Push every 3 minutes PRN For ANY of the following changes in patient status:  A. Breaths Per Minute LESS THAN 10, B. Oxygen saturation LESS THAN 90%, C. Sedation score of 6 for Stop After: 4 Times  ondansetron Injectable 4 milliGRAM(s) IV Push every 6 hours PRN Nausea  oxyCODONE    IR 5 milliGRAM(s) Oral every 3 hours PRN Moderate to Severe Pain (4-10)  oxyCODONE    IR 5 milliGRAM(s) Oral once PRN Moderate to Severe Pain (4-10)  simethicone 80 milliGRAM(s) Chew every 4 hours PRN Gas      Labs:  Blood type: A Positive  Rubella IgG: RPR: Negative                          8.5<L>   13.95<H> >-----------< 303    (  @ 06:28 )             26.3<L>                        8.2<L>   13.82<H> >-----------< 289    (  @ 06:49 )             25.0<L>        PE:  General: NAD  Abdomen: mildly distended, tympanic, appropriately tender, Fundus firm, incision c/d/i.  Extremities: SCDs in place, no erythema

## 2021-08-12 NOTE — PROGRESS NOTE ADULT - ASSESSMENT
A/P: 41yo POD#5 s/p repeat c/s (classical) and repair of bowel serosa, PNC c/b siPEC. Diet advanced slowly due to bowel adhesions at time of c/s with serosal repair, however patient passing flatus and tolerating well. CTAP performed yesterday due to tachycardia and mild abdominal distention - pending final read. Patient is otherwise stable and doing well post-operatively.    - Continue regular diet  - Appreciate gen surg recs  - f/u CTAP final read - prelim unremarkable  - f/u AM CBC (8/12)  - Increase ambulation  - Continue motrin, tylenol, oxycodone PRN for pain control.   - s/p Magnesium for seizure ppx  - Monitor BPs  - c/w Procardia XL to 90mg for BP control, s/p standing Labetalol  - Cardio OB email to be sent on d/c      Robyn Frankel PGY-3

## 2021-08-12 NOTE — PROGRESS NOTE ADULT - ASSESSMENT
40y F w/ pmhx HTN, now POD#5 s/p repeat C/s (classical) @30wk for IUGR with REDV, and lysis of bowel adhesions with repair of bowel serosa by general surgery. Patient is recovering well on floor.     PLAN:  - CT imaging reviwed, no overt pathology. f/u final read.  - No contraindication to discharge if CT final read remains unremarkable and patient continues to tolerate regular diet and passing gas.  - Care per primary team    PARAS Espinal, PGY-2  [facility]  B Team Surgery  [id number]

## 2021-08-12 NOTE — PROGRESS NOTE ADULT - SUBJECTIVE AND OBJECTIVE BOX
Surgery Progress Note    SUBJECTIVE:  - Patient seen and examined at bedside   - Patient states feeling well, passing gas no BM yet  --------------------------------------------------------------------------------------------------  OBJECTIVE:   Physical Exam:  General: AAOx3, NAD, lying comfortably in bed  HEENT: NC/AT  Respiratory: nonlabored breathing  Cardiovascular: RRR, normal S1 and S2, no murmurs or gallops  Abdomen: distended, soft, appropriately tender  Extremities: WWP, no edema  --------------------------------------------------------------------------------------------------  V/S:  Vital Signs Last 24 Hrs  T(C): 37 (12 Aug 2021 06:16), Max: 37.7 (11 Aug 2021 21:52)  T(F): 98.6 (12 Aug 2021 06:16), Max: 99.8 (11 Aug 2021 21:52)  HR: 100 (12 Aug 2021 06:16) (100 - 110)  BP: 117/84 (12 Aug 2021 06:16) (117/84 - 134/92)  BP(mean): --  RR: 17 (12 Aug 2021 06:16) (12 - 17)  SpO2: 97% (12 Aug 2021 06:16) (97% - 100%)    --------------------------------------------------------------------------------------------------  I/Os:    --------------------------------------------------------------------------------------------------  LABS:                        8.5    13.95 )-----------( 303      ( 12 Aug 2021 06:28 )             26.3             CAPILLARY BLOOD GLUCOSE    --------------------------------------------------------------------------------------------------  MEDICATIONS  (STANDING):  acetaminophen   Tablet .. 975 milliGRAM(s) Oral <User Schedule>  ascorbic acid 500 milliGRAM(s) Oral daily  diphtheria/tetanus/pertussis (acellular) Vaccine (ADAcel) 0.5 milliLiter(s) IntraMuscular once  ferrous    sulfate 325 milliGRAM(s) Oral two times a day  heparin   Injectable 5000 Unit(s) SubCutaneous every 12 hours  ibuprofen  Tablet. 600 milliGRAM(s) Oral every 6 hours  NIFEdipine XL 90 milliGRAM(s) Oral daily    MEDICATIONS  (PRN):  dexAMETHasone  Injectable 4 milliGRAM(s) IV Push every 6 hours PRN Nausea  diphenhydrAMINE 25 milliGRAM(s) Oral every 6 hours PRN Pruritus  lanolin Ointment 1 Application(s) Topical every 6 hours PRN Sore Nipples  magnesium hydroxide Suspension 30 milliLiter(s) Oral two times a day PRN Constipation  nalbuphine Injectable 2.5 milliGRAM(s) IV Push every 6 hours PRN Pruritus  naloxone Injectable 0.1 milliGRAM(s) IV Push every 3 minutes PRN For ANY of the following changes in patient status:  A. Breaths Per Minute LESS THAN 10, B. Oxygen saturation LESS THAN 90%, C. Sedation score of 6 for Stop After: 4 Times  ondansetron Injectable 4 milliGRAM(s) IV Push every 6 hours PRN Nausea  oxyCODONE    IR 5 milliGRAM(s) Oral every 3 hours PRN Moderate to Severe Pain (4-10)  oxyCODONE    IR 5 milliGRAM(s) Oral once PRN Moderate to Severe Pain (4-10)  simethicone 80 milliGRAM(s) Chew every 4 hours PRN Gas    --------------------------------------------------------------------------------------------------

## 2021-08-13 RX ORDER — FAMOTIDINE 10 MG/ML
20 INJECTION INTRAVENOUS ONCE
Refills: 0 | Status: COMPLETED | OUTPATIENT
Start: 2021-08-13 | End: 2021-08-13

## 2021-08-13 RX ADMIN — Medication 90 MILLIGRAM(S): at 17:53

## 2021-08-13 RX ADMIN — FAMOTIDINE 20 MILLIGRAM(S): 10 INJECTION INTRAVENOUS at 05:07

## 2021-08-13 RX ADMIN — Medication 600 MILLIGRAM(S): at 23:50

## 2021-08-13 RX ADMIN — POLYETHYLENE GLYCOL 3350 17 GRAM(S): 17 POWDER, FOR SOLUTION ORAL at 11:16

## 2021-08-13 RX ADMIN — Medication 600 MILLIGRAM(S): at 19:00

## 2021-08-13 RX ADMIN — Medication 600 MILLIGRAM(S): at 11:12

## 2021-08-13 RX ADMIN — SIMETHICONE 80 MILLIGRAM(S): 80 TABLET, CHEWABLE ORAL at 04:29

## 2021-08-13 RX ADMIN — HEPARIN SODIUM 5000 UNIT(S): 5000 INJECTION INTRAVENOUS; SUBCUTANEOUS at 11:13

## 2021-08-13 RX ADMIN — MAGNESIUM HYDROXIDE 30 MILLILITER(S): 400 TABLET, CHEWABLE ORAL at 17:53

## 2021-08-13 RX ADMIN — Medication 600 MILLIGRAM(S): at 12:12

## 2021-08-13 RX ADMIN — Medication 600 MILLIGRAM(S): at 17:52

## 2021-08-13 NOTE — PROGRESS NOTE ADULT - SUBJECTIVE AND OBJECTIVE BOX
OB Postpartum Note: Repeat  Delivery, POD#6    S: 41yo POD#6 s/p repeat c/s (classical) @30wks for IUGR with REDV and lysis of bowel adhesions with repair of bowel serosa by gen surg. Patient endorses abdominal discomfort and distention overnight. She is passing flatus and tolerating a regular diet, although has had episodes of nausea. Has not vomited. Voiding spontaneously and ambulating independently without difficulty. Denies CP/SOB/lightheadedness/dizziness. Denies heavy vaginal bleeding.    O:   Vitals:  Vital Signs Last 24 Hrs  T(C): 37.7 (13 Aug 2021 05:13), Max: 37.7 (13 Aug 2021 05:13)  T(F): 99.9 (13 Aug 2021 05:13), Max: 99.9 (13 Aug 2021 05:13)  HR: 110 (13 Aug 2021 05:13) (100 - 117)  BP: 123/94 (13 Aug 2021 05:13) (123/94 - 137/92)  BP(mean): --  RR: 18 (13 Aug 2021 05:13) (16 - 19)  SpO2: 100% (13 Aug 2021 05:13) (99% - 100%)    MEDICATIONS  (STANDING):  acetaminophen   Tablet .. 975 milliGRAM(s) Oral <User Schedule>  ascorbic acid 500 milliGRAM(s) Oral daily  diphtheria/tetanus/pertussis (acellular) Vaccine (ADAcel) 0.5 milliLiter(s) IntraMuscular once  ferrous    sulfate 325 milliGRAM(s) Oral two times a day  heparin   Injectable 5000 Unit(s) SubCutaneous every 12 hours  ibuprofen  Tablet. 600 milliGRAM(s) Oral every 6 hours  NIFEdipine XL 90 milliGRAM(s) Oral daily  polyethylene glycol 3350 17 Gram(s) Oral daily  witch hazel Pads 1 Application(s) Topical four times a day    MEDICATIONS  (PRN):  dexAMETHasone  Injectable 4 milliGRAM(s) IV Push every 6 hours PRN Nausea  diphenhydrAMINE 25 milliGRAM(s) Oral every 6 hours PRN Pruritus  hydrocortisone 1% Cream 1 Application(s) Topical every 6 hours PRN Moderate Pain (4-6)  lanolin Ointment 1 Application(s) Topical every 6 hours PRN Sore Nipples  magnesium hydroxide Suspension 30 milliLiter(s) Oral two times a day PRN Constipation  nalbuphine Injectable 2.5 milliGRAM(s) IV Push every 6 hours PRN Pruritus  naloxone Injectable 0.1 milliGRAM(s) IV Push every 3 minutes PRN For ANY of the following changes in patient status:  A. Breaths Per Minute LESS THAN 10, B. Oxygen saturation LESS THAN 90%, C. Sedation score of 6 for Stop After: 4 Times  ondansetron Injectable 4 milliGRAM(s) IV Push every 6 hours PRN Nausea  oxyCODONE    IR 5 milliGRAM(s) Oral every 3 hours PRN Moderate to Severe Pain (4-10)  oxyCODONE    IR 5 milliGRAM(s) Oral once PRN Moderate to Severe Pain (4-10)  pramoxine 1%/zinc 5% Cream 1 Application(s) Topical every 4 hours PRN Moderate Pain (4-6)  simethicone 80 milliGRAM(s) Chew every 4 hours PRN Gas      Labs:  Blood type: A Positive  Rubella IgG: RPR: Negative                          8.5<L>   13.95<H> >-----------< 303    (  @ 06:28 )             26.3<L>                        8.2<L>   13.82<H> >-----------< 289    (  @ 06:49 )             25.0<L>        PE:  General: NAD  Abdomen: +BS in all quadrants, mildly distended, tympanic, appropriately tender, Fundus firm, incision c/d/i.  Extremities: SCDs in place, no erythema

## 2021-08-13 NOTE — PROGRESS NOTE ADULT - ASSESSMENT
A/P: 39yo POD#6 s/p repeat c/s (classical) and repair of bowel serosa, PNC c/b siPEC. Diet advanced slowly due to bowel adhesions at time of c/s with serosal repair; patient is still passing flatus, but has not had BM yet. Endorses some abdominal pain overnight (similar to pain experienced during her pregnancy). CTAP performed yesterday due to tachycardia showed no evidence of ileus, SBO or other bowel pathology.    - Continue regular diet; counseled patient to avoid heavy foods  -C/w bowel regimen; c/w pepcid/simethicone  - Appreciate gen surg recs  - f/u CTAP (8/11): unremarkable - no evidence of injury to bowel  - Increase ambulation  - Continue motrin, tylenol, oxycodone PRN for pain control  - s/p Magnesium for seizure ppx  - Monitor BPs  - c/w Procardia XL to 90mg for BP control, s/p standing Labetalol  - Cardio OB email to be sent on d/c    Robyn Frankel PGY-3

## 2021-08-13 NOTE — PROGRESS NOTE ADULT - SUBJECTIVE AND OBJECTIVE BOX
Surgery Progress Note    SUBJECTIVE:  - Patient seen and examined at bedside   - Patient states not feeling as good as yesterday, refers having some nausea and abdominal pain and feeling bloated  - Passing joyce but noBM yet, continues to ambulate frequently  --------------------------------------------------------------------------------------------------  OBJECTIVE:   Physical Exam:  General: AAOx3, NAD, lying comfortably in bed  HEENT: NC/AT  Respiratory: nonlabored breathing  Cardiovascular: RRR, normal S1 and S2, no murmurs or gallops  Abdomen: distended, soft, mild tenderness in lower quadrants  Extremities: WWP, no edema  --------------------------------------------------------------------------------------------------  V/S:  Vital Signs Last 24 Hrs  T(C): 37.3 (13 Aug 2021 09:40), Max: 37.7 (13 Aug 2021 05:13)  T(F): 99.1 (13 Aug 2021 09:40), Max: 99.9 (13 Aug 2021 05:13)  HR: 102 (13 Aug 2021 09:40) (100 - 117)  BP: 137/91 (13 Aug 2021 09:40) (123/94 - 137/92)  BP(mean): --  RR: 16 (13 Aug 2021 09:40) (16 - 19)  SpO2: 99% (13 Aug 2021 09:40) (99% - 100%)    --------------------------------------------------------------------------------------------------  I/Os:    12 Aug 2021 07:01  -  13 Aug 2021 07:00  --------------------------------------------------------  IN:  Total IN: 0 mL    OUT:    Voided (mL): 400 mL  Total OUT: 400 mL    Total NET: -400 mL      13 Aug 2021 07:01  -  13 Aug 2021 10:07  --------------------------------------------------------  IN:  Total IN: 0 mL    OUT:    Voided (mL): 300 mL  Total OUT: 300 mL    Total NET: -300 mL        --------------------------------------------------------------------------------------------------  LABS:                        8.5    13.95 )-----------( 303      ( 12 Aug 2021 06:28 )             26.3             CAPILLARY BLOOD GLUCOSE                  --------------------------------------------------------------------------------------------------  MEDICATIONS  (STANDING):  acetaminophen   Tablet .. 975 milliGRAM(s) Oral <User Schedule>  ascorbic acid 500 milliGRAM(s) Oral daily  diphtheria/tetanus/pertussis (acellular) Vaccine (ADAcel) 0.5 milliLiter(s) IntraMuscular once  ferrous    sulfate 325 milliGRAM(s) Oral two times a day  heparin   Injectable 5000 Unit(s) SubCutaneous every 12 hours  ibuprofen  Tablet. 600 milliGRAM(s) Oral every 6 hours  NIFEdipine XL 90 milliGRAM(s) Oral daily  polyethylene glycol 3350 17 Gram(s) Oral daily  witch hazel Pads 1 Application(s) Topical four times a day    MEDICATIONS  (PRN):  dexAMETHasone  Injectable 4 milliGRAM(s) IV Push every 6 hours PRN Nausea  diphenhydrAMINE 25 milliGRAM(s) Oral every 6 hours PRN Pruritus  hydrocortisone 1% Cream 1 Application(s) Topical every 6 hours PRN Moderate Pain (4-6)  lanolin Ointment 1 Application(s) Topical every 6 hours PRN Sore Nipples  magnesium hydroxide Suspension 30 milliLiter(s) Oral two times a day PRN Constipation  nalbuphine Injectable 2.5 milliGRAM(s) IV Push every 6 hours PRN Pruritus  naloxone Injectable 0.1 milliGRAM(s) IV Push every 3 minutes PRN For ANY of the following changes in patient status:  A. Breaths Per Minute LESS THAN 10, B. Oxygen saturation LESS THAN 90%, C. Sedation score of 6 for Stop After: 4 Times  ondansetron Injectable 4 milliGRAM(s) IV Push every 6 hours PRN Nausea  oxyCODONE    IR 5 milliGRAM(s) Oral every 3 hours PRN Moderate to Severe Pain (4-10)  oxyCODONE    IR 5 milliGRAM(s) Oral once PRN Moderate to Severe Pain (4-10)  pramoxine 1%/zinc 5% Cream 1 Application(s) Topical every 4 hours PRN Moderate Pain (4-6)  simethicone 80 milliGRAM(s) Chew every 4 hours PRN Gas    --------------------------------------------------------------------------------------------------

## 2021-08-13 NOTE — PROGRESS NOTE ADULT - ASSESSMENT
40y F w/ pmhx HTN, now POD#5 s/p repeat C/s (classical) @30wk for IUGR with REDV, and lysis of bowel adhesions with repair of bowel serosa by general surgery. Patient is recovering well on floor.     PLAN:  - Educated patient to not continue with PO diet if nausea continues/worsens  - Continue to monitor GI function  - Care per primary team.    PARAS Espinal, PGY-2  [facility]  B Team Surgery  [id number]

## 2021-08-14 VITALS
RESPIRATION RATE: 12 BRPM | SYSTOLIC BLOOD PRESSURE: 132 MMHG | DIASTOLIC BLOOD PRESSURE: 95 MMHG | OXYGEN SATURATION: 100 % | HEART RATE: 106 BPM

## 2021-08-14 RX ORDER — NIFEDIPINE 30 MG
1 TABLET, EXTENDED RELEASE 24 HR ORAL
Qty: 14 | Refills: 0
Start: 2021-08-14 | End: 2021-08-27

## 2021-08-14 RX ADMIN — POLYETHYLENE GLYCOL 3350 17 GRAM(S): 17 POWDER, FOR SOLUTION ORAL at 12:23

## 2021-08-14 RX ADMIN — Medication 90 MILLIGRAM(S): at 18:49

## 2021-08-14 RX ADMIN — Medication 500 MILLIGRAM(S): at 11:51

## 2021-08-14 RX ADMIN — Medication 600 MILLIGRAM(S): at 18:48

## 2021-08-14 RX ADMIN — Medication 600 MILLIGRAM(S): at 00:50

## 2021-08-14 RX ADMIN — Medication 600 MILLIGRAM(S): at 11:51

## 2021-08-14 NOTE — PROGRESS NOTE ADULT - ATTENDING SUPERVISION STATEMENT
Resident
Fellow

## 2021-08-14 NOTE — PROGRESS NOTE ADULT - PROVIDER SPECIALTY LIST ADULT
MFSUSANNE
OB
Surgery
OB
Surgery
Surgery
Anesthesia
MFSUSANNE
OB
Surgery
MFSUSANNE
OB
Surgery
OB

## 2021-08-14 NOTE — PROGRESS NOTE ADULT - ASSESSMENT
A/P: 39yo POD#7 s/p repeat c/s (classical) and repair of bowel serosa, PNC c/b siPEC. Diet advanced slowly due to bowel adhesions at time of c/s with serosal repair; patient is still passing flatus and having bowel movements, with improved nausea, tolerating diet.     - Continue regular diet; counseled patient to avoid heavy foods  -C/w bowel regimen; c/w pepcid/simethicone  - Appreciate gen surg recs  - f/u CTAP (8/11): unremarkable - no evidence of injury to bowel  - Increase ambulation  - Continue motrin, tylenol, oxycodone PRN for pain control  - s/p Magnesium for seizure ppx  - Monitor BPs  - c/w Procardia XL to 90mg for BP control, s/p standing Labetalol  - Cardio OB email to be sent on d/c  - Plan for discharge home today    Waqar PGY3

## 2021-08-14 NOTE — PROGRESS NOTE ADULT - REASON FOR ADMISSION
a/w siPEC w/sev feat vs cHTN exacerbation w/ new dx IUGR (2%) with AEDV
Repeat C/S
a/w siPEC, IUGR (2%) + int. REDV s/p rCCS w/ classical at 30+3 c/b small bowel serosal lysis
siPEC

## 2021-08-14 NOTE — PROGRESS NOTE ADULT - SUBJECTIVE AND OBJECTIVE BOX
Surgery Progress Note    SUBJECTIVE:  - Patient seen and examined at bedside. Patient reports that her nausea has improved significantly. Passing joyce and BM, continues to ambulate frequently.  --------------------------------------------------------------------------------------------------  OBJECTIVE:   Physical Exam:  General: AAOx3, NAD, lying comfortably in bed  HEENT: NC/AT  Respiratory: nonlabored breathing  Cardiovascular: RRR, normal S1 and S2, no murmurs or gallops  Abdomen: distended, soft, mild tenderness in lower quadrants  Extremities: WWP, no edema  --------------------------------------------------------------------------------------------------  V/S:  Vital Signs Last 24 Hrs  T(C): 37.3 (13 Aug 2021 09:40), Max: 37.7 (13 Aug 2021 05:13)  T(F): 99.1 (13 Aug 2021 09:40), Max: 99.9 (13 Aug 2021 05:13)  HR: 102 (13 Aug 2021 09:40) (100 - 117)  BP: 137/91 (13 Aug 2021 09:40) (123/94 - 137/92)  BP(mean): --  RR: 16 (13 Aug 2021 09:40) (16 - 19)  SpO2: 99% (13 Aug 2021 09:40) (99% - 100%)    --------------------------------------------------------------------------------------------------  I/Os:    12 Aug 2021 07:01  -  13 Aug 2021 07:00  --------------------------------------------------------  IN:  Total IN: 0 mL    OUT:    Voided (mL): 400 mL  Total OUT: 400 mL    Total NET: -400 mL      13 Aug 2021 07:01  -  13 Aug 2021 10:07  --------------------------------------------------------  IN:  Total IN: 0 mL    OUT:    Voided (mL): 300 mL  Total OUT: 300 mL    Total NET: -300 mL        --------------------------------------------------------------------------------------------------  LABS:                        8.5    13.95 )-----------( 303      ( 12 Aug 2021 06:28 )             26.3             CAPILLARY BLOOD GLUCOSE                  --------------------------------------------------------------------------------------------------  MEDICATIONS  (STANDING):  acetaminophen   Tablet .. 975 milliGRAM(s) Oral <User Schedule>  ascorbic acid 500 milliGRAM(s) Oral daily  diphtheria/tetanus/pertussis (acellular) Vaccine (ADAcel) 0.5 milliLiter(s) IntraMuscular once  ferrous    sulfate 325 milliGRAM(s) Oral two times a day  heparin   Injectable 5000 Unit(s) SubCutaneous every 12 hours  ibuprofen  Tablet. 600 milliGRAM(s) Oral every 6 hours  NIFEdipine XL 90 milliGRAM(s) Oral daily  polyethylene glycol 3350 17 Gram(s) Oral daily  witch hazel Pads 1 Application(s) Topical four times a day    MEDICATIONS  (PRN):  dexAMETHasone  Injectable 4 milliGRAM(s) IV Push every 6 hours PRN Nausea  diphenhydrAMINE 25 milliGRAM(s) Oral every 6 hours PRN Pruritus  hydrocortisone 1% Cream 1 Application(s) Topical every 6 hours PRN Moderate Pain (4-6)  lanolin Ointment 1 Application(s) Topical every 6 hours PRN Sore Nipples  magnesium hydroxide Suspension 30 milliLiter(s) Oral two times a day PRN Constipation  nalbuphine Injectable 2.5 milliGRAM(s) IV Push every 6 hours PRN Pruritus  naloxone Injectable 0.1 milliGRAM(s) IV Push every 3 minutes PRN For ANY of the following changes in patient status:  A. Breaths Per Minute LESS THAN 10, B. Oxygen saturation LESS THAN 90%, C. Sedation score of 6 for Stop After: 4 Times  ondansetron Injectable 4 milliGRAM(s) IV Push every 6 hours PRN Nausea  oxyCODONE    IR 5 milliGRAM(s) Oral every 3 hours PRN Moderate to Severe Pain (4-10)  oxyCODONE    IR 5 milliGRAM(s) Oral once PRN Moderate to Severe Pain (4-10)  pramoxine 1%/zinc 5% Cream 1 Application(s) Topical every 4 hours PRN Moderate Pain (4-6)  simethicone 80 milliGRAM(s) Chew every 4 hours PRN Gas    --------------------------------------------------------------------------------------------------

## 2021-08-14 NOTE — PROGRESS NOTE ADULT - SUBJECTIVE AND OBJECTIVE BOX
OB Postpartum Note:  Delivery, POD#7    S:  39yo POD#7 s/p repeat c/s (classical) @30wks for IUGR with REDV and lysis of bowel adhesions with repair of bowel serosa by gen surg.  Patient continues to tolerate diet, passing flatus and had a bowel movement yesterday. No nausea overnight. Voiding spontaneously and ambulating independently without difficulty. Denies CP/SOB/lightheadedness/dizziness. Denies heavy vaginal bleeding.      O:  Vitals:  Vital Signs Last 24 Hrs  T(C): 37.4 (14 Aug 2021 00:09), Max: 37.7 (13 Aug 2021 05:13)  T(F): 99.3 (14 Aug 2021 00:09), Max: 99.9 (13 Aug 2021 05:13)  HR: 103 (14 Aug 2021 00:09) (101 - 110)  BP: 121/87 (14 Aug 2021 00:09) (121/87 - 137/91)  BP(mean): --  RR: 18 (14 Aug 2021 00:09) (16 - 18)  SpO2: 99% (14 Aug 2021 00:09) (99% - 100%)    MEDICATIONS  (STANDING):  acetaminophen   Tablet .. 975 milliGRAM(s) Oral <User Schedule>  ascorbic acid 500 milliGRAM(s) Oral daily  diphtheria/tetanus/pertussis (acellular) Vaccine (ADAcel) 0.5 milliLiter(s) IntraMuscular once  ferrous    sulfate 325 milliGRAM(s) Oral two times a day  heparin   Injectable 5000 Unit(s) SubCutaneous every 12 hours  ibuprofen  Tablet. 600 milliGRAM(s) Oral every 6 hours  NIFEdipine XL 90 milliGRAM(s) Oral daily  polyethylene glycol 3350 17 Gram(s) Oral daily  witch hazel Pads 1 Application(s) Topical four times a day    MEDICATIONS  (PRN):  dexAMETHasone  Injectable 4 milliGRAM(s) IV Push every 6 hours PRN Nausea  diphenhydrAMINE 25 milliGRAM(s) Oral every 6 hours PRN Pruritus  hydrocortisone 1% Cream 1 Application(s) Topical every 6 hours PRN Moderate Pain (4-6)  lanolin Ointment 1 Application(s) Topical every 6 hours PRN Sore Nipples  magnesium hydroxide Suspension 30 milliLiter(s) Oral two times a day PRN Constipation  nalbuphine Injectable 2.5 milliGRAM(s) IV Push every 6 hours PRN Pruritus  naloxone Injectable 0.1 milliGRAM(s) IV Push every 3 minutes PRN For ANY of the following changes in patient status:  A. Breaths Per Minute LESS THAN 10, B. Oxygen saturation LESS THAN 90%, C. Sedation score of 6 for Stop After: 4 Times  ondansetron Injectable 4 milliGRAM(s) IV Push every 6 hours PRN Nausea  oxyCODONE    IR 5 milliGRAM(s) Oral every 3 hours PRN Moderate to Severe Pain (4-10)  oxyCODONE    IR 5 milliGRAM(s) Oral once PRN Moderate to Severe Pain (4-10)  pramoxine 1%/zinc 5% Cream 1 Application(s) Topical every 4 hours PRN Moderate Pain (4-6)  simethicone 80 milliGRAM(s) Chew every 4 hours PRN Gas      LABS:  Blood type: A Positive  Rubella IgG: RPR: Negative                          8.5<L>   13.95<H> >-----------< 303    (  @ 06:28 )             26.3<L>                        8.2<L>   13.82<H> >-----------< 289    (  @ 06:49 )             25.0<L>                  Physical exam:  Gen: NAD  Abdomen: Soft, nontender, no distension , firm uterine fundus at umbilicus.  Incision: Clean, dry, and intact   Pelvic: Normal lochia noted  Ext: No calf tenderness

## 2021-08-14 NOTE — PROGRESS NOTE ADULT - ATTENDING COMMENTS
Pt seen and examined by me today. I agree with findings, assessment and plan documented in resident note. Final read on CT negative for bowel injury. Anticipate discharge to home after BM.
41yo s/p re[eat classical csection complicated by repair of bowel serosa  Patient denies nausea and vomiting  Tolerating diet  Ambulating  Continue Procardia 90mg XL  Follow up in office for BP check in 3 days    JAN Levin MD [facility]
BP well controlled  Abdomen mild distension. No nausea or vomitting  Tolerating liquids  recommend advancing diet
Patient reporting nausea this AM  Passing flatus  Ambulating  Has some SOB and palpitations when ambulating but this resolves  Had 1 bowel movement this AM  Abdomen is tense and distended, non tender  Patient reports this distention is new  Continue to monitor in house    JAN Levin MD [facility]
Patient sleeping when went to see her  FHT reviewed and improved from yesterday
Pt seen and evaluated by me today. I agree with findings, assessment and plan documented in resident note.
Pt seen during evaluation by Dr Galvan  She reports no c/o  Now intermittent REDV.  Rescue steroids tonight  Pt aware she may require urgent C/S given current fetal status if any change.
patient seen and examined at bedside. agree with above assessment and plan
Patient is tolerating clear diet  Denies nausea and vomiting  No flatus.  Reports burping    Abd: soft, appropriately tender + mild distension, no guarding or rebound  Inc: c/d/i    -Continue clear diet  for now  -Analgesia prn  -OOB as tolerated    J Brenden Levin MD [facility]
Pt denies N/V.  Blurred vision improved  Agree with above.  Stable on Magnesium sulfate  Continue current care  Plan for inpatient care until POD#5 given multiple enterotomies and repair
Pt seen and examined by me today. I agree with findings, assessment and plan documented in resident note.
discussed with patient and partner findings on sonogram and that chronic hypertension contributes to IUGR and sono findins but also probably underlying PEC
saw patient and agree with note
BPs low normal. Considering patient's CHTN, and currently lower than baseline BPs, uterine perfusion may be decreased and therefore potentially contributing to fetal heart rate abnormalities and would recommend stopping labetalol for now to observe BPs.

## 2021-08-14 NOTE — PROGRESS NOTE ADULT - ASSESSMENT
40y F w/ pmhx HTN, now POD#5 s/p repeat C/s (classical) @30wk for IUGR with REDV, and lysis of bowel adhesions with repair of bowel serosa by general surgery. Patient is recovering well on floor.     PLAN:    - Care per primary team.  - No contraindication to DC per OBGYN    B Team Surgery  [id number]

## 2021-08-15 ENCOUNTER — NON-APPOINTMENT (OUTPATIENT)
Age: 41
End: 2021-08-15

## 2021-08-15 RX ORDER — LABETALOL HYDROCHLORIDE 200 MG/1
200 TABLET, FILM COATED ORAL TWICE DAILY
Qty: 60 | Refills: 1 | Status: DISCONTINUED | COMMUNITY
Start: 2021-03-16 | End: 2021-08-15

## 2021-08-15 RX ORDER — ASPIRIN 81 MG
81 TABLET,CHEWABLE ORAL
Refills: 0 | Status: DISCONTINUED | COMMUNITY
End: 2021-08-15

## 2021-08-17 ENCOUNTER — NON-APPOINTMENT (OUTPATIENT)
Age: 41
End: 2021-08-17

## 2021-08-24 ENCOUNTER — NON-APPOINTMENT (OUTPATIENT)
Age: 41
End: 2021-08-24

## 2021-08-25 ENCOUNTER — APPOINTMENT (OUTPATIENT)
Dept: OBGYN | Facility: CLINIC | Age: 41
End: 2021-08-25
Payer: COMMERCIAL

## 2021-08-25 LAB — SURGICAL PATHOLOGY STUDY: SIGNIFICANT CHANGE UP

## 2021-08-25 PROCEDURE — 99211 OFF/OP EST MAY X REQ PHY/QHP: CPT

## 2021-08-31 ENCOUNTER — NON-APPOINTMENT (OUTPATIENT)
Age: 41
End: 2021-08-31

## 2021-09-09 ENCOUNTER — NON-APPOINTMENT (OUTPATIENT)
Age: 41
End: 2021-09-09

## 2021-09-14 ENCOUNTER — NON-APPOINTMENT (OUTPATIENT)
Age: 41
End: 2021-09-14

## 2021-09-15 ENCOUNTER — NON-APPOINTMENT (OUTPATIENT)
Age: 41
End: 2021-09-15

## 2021-09-21 ENCOUNTER — APPOINTMENT (OUTPATIENT)
Dept: OBGYN | Facility: CLINIC | Age: 41
End: 2021-09-21
Payer: COMMERCIAL

## 2021-09-21 VITALS
BODY MASS INDEX: 23.92 KG/M2 | SYSTOLIC BLOOD PRESSURE: 152 MMHG | DIASTOLIC BLOOD PRESSURE: 92 MMHG | HEART RATE: 92 BPM | HEIGHT: 63 IN | WEIGHT: 135 LBS

## 2021-09-21 PROCEDURE — 0503F POSTPARTUM CARE VISIT: CPT

## 2021-09-21 RX ORDER — PRAMOXINE HYDROCHLORIDE HYDROCORTISONE ACETATE 100; 100 MG/10G; MG/10G
1-1 AEROSOL, FOAM TOPICAL
Qty: 1 | Refills: 1 | Status: COMPLETED | COMMUNITY
Start: 2021-06-11 | End: 2021-09-21

## 2021-09-21 RX ORDER — PRENATAL VIT NO.130/IRON/FOLIC 27MG-0.8MG
TABLET ORAL
Refills: 0 | Status: COMPLETED | COMMUNITY
End: 2021-09-21

## 2021-09-21 NOTE — HISTORY OF PRESENT ILLNESS
[Postpartum Follow Up] : postpartum follow up [Delivery Date: ___] : on [unfilled] [Repeat C/S] : delivered by  section (repeat) [Male] : Delivery History: baby boy [Wt. ___] : weighing [unfilled] [Boy] : baby is a boy [___ Lbs] : [unfilled] lbs [___ Oz] : [unfilled] oz [Not Circumcised] : not circumcised [Living at Home] : is currently living at home [Breastfeeding] : currently nursing [NICU: ___] : NICU: [unfilled] [Rhogam] : Rhogam was not administered [Rubella Vaccine] : Rubella vaccine was not administered [Pertussis Vaccine] : Pertussis vaccine was not administered [BF with Difficulty] : nursing without difficulty [Resumed Menses] : has not resumed her menses [Resumed [place]] : has not resumed intercourse [Intended Contraception] : Intended Contraception: [Condoms] : condoms [Breast Pain] : no breast pain [Cracked Nipples] : cracked nipples [S/Sx PP Depression] : no signs/symptoms of postpartum depression [Clean/Dry/Intact] : clean, dry and intact [Healed] : healed [Back to Normal] : is still enlarged [None] : no vaginal bleeding [Normal] : the vagina was normal [Examination Of The Breasts] : breasts are normal [Soft] : soft [Tender] : non tender [Distended] : not distended [Doing Well] : is doing well [de-identified] : Severe preeclampsia

## 2022-04-28 ENCOUNTER — APPOINTMENT (OUTPATIENT)
Dept: OBGYN | Facility: CLINIC | Age: 42
End: 2022-04-28

## 2022-05-18 ENCOUNTER — APPOINTMENT (OUTPATIENT)
Dept: OBGYN | Facility: CLINIC | Age: 42
End: 2022-05-18
Payer: COMMERCIAL

## 2022-05-18 VITALS
WEIGHT: 126 LBS | SYSTOLIC BLOOD PRESSURE: 162 MMHG | DIASTOLIC BLOOD PRESSURE: 108 MMHG | BODY MASS INDEX: 22.32 KG/M2 | HEIGHT: 63 IN | HEART RATE: 80 BPM

## 2022-05-18 VITALS — DIASTOLIC BLOOD PRESSURE: 93 MMHG | HEART RATE: 81 BPM | SYSTOLIC BLOOD PRESSURE: 150 MMHG

## 2022-05-18 DIAGNOSIS — Z01.419 ENCOUNTER FOR GYNECOLOGICAL EXAMINATION (GENERAL) (ROUTINE) W/OUT ABNORMAL FINDINGS: ICD-10-CM

## 2022-05-18 PROCEDURE — 99396 PREV VISIT EST AGE 40-64: CPT

## 2022-05-23 NOTE — OB NEONATOLOGY/PEDIATRICIAN DELIVERY SUMMARY - NS_FINALEDD_OBGYN_ALL_OB_DT
Addended by: VINNIE MEANS on: 5/23/2022 12:26 PM     Modules accepted: Orders    
15-Oct-2021
V-Y Flap Text: The defect edges were debeveled with a #15 scalpel blade.  Given the location of the defect, shape of the defect and the proximity to free margins a V-Y flap was deemed most appropriate.  Using a sterile surgical marker, an appropriate advancement flap was drawn incorporating the defect and placing the expected incisions within the relaxed skin tension lines where possible.    The area thus outlined was incised deep to adipose tissue with a #15 scalpel blade.  The skin margins were undermined to an appropriate distance in all directions utilizing iris scissors.

## 2022-09-09 ENCOUNTER — NON-APPOINTMENT (OUTPATIENT)
Age: 42
End: 2022-09-09

## 2023-04-17 ENCOUNTER — APPOINTMENT (OUTPATIENT)
Dept: OBGYN | Facility: CLINIC | Age: 43
End: 2023-04-17
Payer: COMMERCIAL

## 2023-04-17 VITALS
DIASTOLIC BLOOD PRESSURE: 86 MMHG | BODY MASS INDEX: 21.09 KG/M2 | WEIGHT: 119 LBS | HEIGHT: 63 IN | HEART RATE: 80 BPM | SYSTOLIC BLOOD PRESSURE: 125 MMHG

## 2023-04-17 DIAGNOSIS — N92.6 IRREGULAR MENSTRUATION, UNSPECIFIED: ICD-10-CM

## 2023-04-17 DIAGNOSIS — Z32.01 ENCOUNTER FOR PREGNANCY TEST, RESULT POSITIVE: ICD-10-CM

## 2023-04-17 DIAGNOSIS — O09.522 SUPERVISION OF ELDERLY MULTIGRAVIDA, SECOND TRIMESTER: ICD-10-CM

## 2023-04-17 DIAGNOSIS — Z87.59 PERSONAL HISTORY OF OTHER COMPLICATIONS OF PREGNANCY, CHILDBIRTH AND THE PUERPERIUM: ICD-10-CM

## 2023-04-17 DIAGNOSIS — Z87.42 PERSONAL HISTORY OF OTHER DISEASES OF THE FEMALE GENITAL TRACT: ICD-10-CM

## 2023-04-17 DIAGNOSIS — R73.09 OTHER ABNORMAL GLUCOSE: ICD-10-CM

## 2023-04-17 DIAGNOSIS — O22.42: ICD-10-CM

## 2023-04-17 PROCEDURE — 99213 OFFICE O/P EST LOW 20 MIN: CPT

## 2023-04-17 RX ORDER — LABETALOL HYDROCHLORIDE 100 MG/1
100 TABLET, FILM COATED ORAL
Refills: 0 | Status: ACTIVE | COMMUNITY

## 2023-04-17 RX ORDER — FOLIC ACID/MULTIVIT,IRON,MINER .4-18-35
TABLET,CHEWABLE ORAL
Refills: 0 | Status: ACTIVE | COMMUNITY

## 2023-04-17 RX ORDER — NIFEDIPINE 90 MG/1
90 TABLET, EXTENDED RELEASE ORAL
Refills: 0 | Status: DISCONTINUED | COMMUNITY
Start: 2020-02-11 | End: 2023-04-17

## 2023-04-17 NOTE — PHYSICAL EXAM
[Chaperone Present] : A chaperone was present in the examining room during all aspects of the physical examination [Labia Majora] : normal [Labia Minora] : normal [Moderate] : There was moderate vaginal bleeding [Normal] : normal [Tenderness] : nontender [Uterine Adnexae] : non-palpable

## 2023-04-19 ENCOUNTER — NON-APPOINTMENT (OUTPATIENT)
Age: 43
End: 2023-04-19

## 2023-04-20 ENCOUNTER — NON-APPOINTMENT (OUTPATIENT)
Age: 43
End: 2023-04-20

## 2023-04-20 LAB
C TRACH RRNA SPEC QL NAA+PROBE: NOT DETECTED
ESTRADIOL SERPL-MCNC: 162 PG/ML
FSH SERPL-MCNC: 6.6 IU/L
HCG SERPL-MCNC: <1 MIU/ML
N GONORRHOEA RRNA SPEC QL NAA+PROBE: NOT DETECTED
PROLACTIN SERPL-MCNC: 13.7 NG/ML
SOURCE AMPLIFICATION: NORMAL
TSH SERPL-ACNC: 0.64 UIU/ML

## 2023-06-08 ENCOUNTER — APPOINTMENT (OUTPATIENT)
Dept: NEUROLOGY | Facility: CLINIC | Age: 43
End: 2023-06-08

## 2023-06-08 ENCOUNTER — NON-APPOINTMENT (OUTPATIENT)
Age: 43
End: 2023-06-08

## 2024-01-22 ENCOUNTER — APPOINTMENT (OUTPATIENT)
Dept: CT IMAGING | Facility: CLINIC | Age: 44
End: 2024-01-22
Payer: COMMERCIAL

## 2024-01-22 ENCOUNTER — OUTPATIENT (OUTPATIENT)
Dept: OUTPATIENT SERVICES | Facility: HOSPITAL | Age: 44
LOS: 1 days | End: 2024-01-22
Payer: COMMERCIAL

## 2024-01-22 DIAGNOSIS — Z98.890 OTHER SPECIFIED POSTPROCEDURAL STATES: Chronic | ICD-10-CM

## 2024-01-22 DIAGNOSIS — Z00.00 ENCOUNTER FOR GENERAL ADULT MEDICAL EXAMINATION WITHOUT ABNORMAL FINDINGS: ICD-10-CM

## 2024-01-22 DIAGNOSIS — Z98.891 HISTORY OF UTERINE SCAR FROM PREVIOUS SURGERY: Chronic | ICD-10-CM

## 2024-01-22 PROCEDURE — 74177 CT ABD & PELVIS W/CONTRAST: CPT | Mod: 26

## 2024-01-22 PROCEDURE — 74177 CT ABD & PELVIS W/CONTRAST: CPT

## 2024-03-08 ENCOUNTER — APPOINTMENT (OUTPATIENT)
Dept: OBGYN | Facility: CLINIC | Age: 44
End: 2024-03-08
Payer: COMMERCIAL

## 2024-03-08 VITALS
HEIGHT: 63 IN | DIASTOLIC BLOOD PRESSURE: 68 MMHG | SYSTOLIC BLOOD PRESSURE: 108 MMHG | BODY MASS INDEX: 20.73 KG/M2 | WEIGHT: 117 LBS

## 2024-03-08 DIAGNOSIS — Z01.411 ENCOUNTER FOR GYNECOLOGICAL EXAMINATION (GENERAL) (ROUTINE) WITH ABNORMAL FINDINGS: ICD-10-CM

## 2024-03-08 DIAGNOSIS — N91.4 SECONDARY OLIGOMENORRHEA: ICD-10-CM

## 2024-03-08 PROCEDURE — 36415 COLL VENOUS BLD VENIPUNCTURE: CPT

## 2024-03-08 PROCEDURE — 99396 PREV VISIT EST AGE 40-64: CPT

## 2024-03-08 RX ORDER — MEDROXYPROGESTERONE ACETATE 10 MG/1
10 TABLET ORAL DAILY
Qty: 10 | Refills: 0 | Status: ACTIVE | COMMUNITY
Start: 2024-03-08 | End: 1900-01-01

## 2024-03-08 NOTE — HISTORY OF PRESENT ILLNESS
[FreeTextEntry1] : 44yo  LMP 10/2023 here for annual exam.  Also reporting oligomenorrhea, with menses skipping 3-4 months at a time.  LTCS x 2 SAB x 1 [No] : Patient does not have concerns regarding sex [Currently Active] : currently active

## 2024-03-08 NOTE — PHYSICAL EXAM
[Appropriately responsive] : appropriately responsive [Alert] : alert [No Acute Distress] : no acute distress [Soft] : soft [Non-tender] : non-tender [No HSM] : No HSM [Non-distended] : non-distended [No Lesions] : no lesions [No Mass] : no mass [Oriented x3] : oriented x3 [Examination Of The Breasts] : a normal appearance [No Masses] : no breast masses were palpable [Labia Minora] : normal [Labia Majora] : normal [Normal] : normal [Uterine Adnexae] : normal

## 2024-03-08 NOTE — COUNSELING
[Nutrition/ Exercise/ Weight Management] : nutrition, exercise, weight management [Vitamins/Supplements] : vitamins/supplements [FreeTextEntry2] : irregular menses

## 2024-03-08 NOTE — HISTORY OF PRESENT ILLNESS
[FreeTextEntry1] : 42yo  LMP 10/2023 here for annual exam.  Also reporting oligomenorrhea, with menses skipping 3-4 months at a time.  LTCS x 2 SAB x 1 [No] : Patient does not have concerns regarding sex [Currently Active] : currently active

## 2024-03-08 NOTE — DISCUSSION/SUMMARY
[FreeTextEntry1] : - Pap/HPV obtained today - hormone panel sent for oligomenorrhea - Mammo/Sono requisition given - GI referral given for Colonoscopy  - progesterone sent for provera challenge

## 2024-03-08 NOTE — COUNSELING
[Vitamins/Supplements] : vitamins/supplements [Nutrition/ Exercise/ Weight Management] : nutrition, exercise, weight management [FreeTextEntry2] : irregular menses

## 2024-03-08 NOTE — PHYSICAL EXAM
[Appropriately responsive] : appropriately responsive [No Acute Distress] : no acute distress [Alert] : alert [Soft] : soft [Non-tender] : non-tender [Non-distended] : non-distended [No HSM] : No HSM [No Lesions] : no lesions [No Mass] : no mass [Oriented x3] : oriented x3 [Examination Of The Breasts] : a normal appearance [No Masses] : no breast masses were palpable [Labia Minora] : normal [Labia Majora] : normal [Normal] : normal [Uterine Adnexae] : normal

## 2024-03-12 LAB
BASOPHILS # BLD AUTO: 0.05 K/UL
BASOPHILS NFR BLD AUTO: 1 %
C TRACH RRNA SPEC QL NAA+PROBE: NOT DETECTED
CYTOLOGY CVX/VAG DOC THIN PREP: NORMAL
DHEA-S SERPL-MCNC: 263 UG/DL
EOSINOPHIL # BLD AUTO: 0.17 K/UL
EOSINOPHIL NFR BLD AUTO: 3.5 %
ESTRADIOL SERPL-MCNC: 26 PG/ML
FSH SERPL-MCNC: 88.3 IU/L
HCT VFR BLD CALC: 42.1 %
HGB BLD-MCNC: 13.8 G/DL
HPV HIGH+LOW RISK DNA PNL CVX: DETECTED
IMM GRANULOCYTES NFR BLD AUTO: 0.2 %
LH SERPL-ACNC: 71.4 IU/L
LYMPHOCYTES # BLD AUTO: 1.67 K/UL
LYMPHOCYTES NFR BLD AUTO: 34.3 %
MAN DIFF?: NORMAL
MCHC RBC-ENTMCNC: 30.5 PG
MCHC RBC-ENTMCNC: 32.8 GM/DL
MCV RBC AUTO: 92.9 FL
MONOCYTES # BLD AUTO: 0.42 K/UL
MONOCYTES NFR BLD AUTO: 8.6 %
N GONORRHOEA RRNA SPEC QL NAA+PROBE: NOT DETECTED
NEUTROPHILS # BLD AUTO: 2.55 K/UL
NEUTROPHILS NFR BLD AUTO: 52.4 %
PLATELET # BLD AUTO: 283 K/UL
PROGEST SERPL-MCNC: 0.2 NG/ML
PROLACTIN SERPL-MCNC: 11.9 NG/ML
RBC # BLD: 4.53 M/UL
RBC # FLD: 13.2 %
SOURCE AMPLIFICATION: NORMAL
SOURCE TP AMPLIFICATION: NORMAL
T VAGINALIS RRNA SPEC QL NAA+PROBE: NOT DETECTED
T4 FREE SERPL-MCNC: 1.2 NG/DL
TESTOST SERPL-MCNC: 12.2 NG/DL
TSH SERPL-ACNC: 0.91 UIU/ML
WBC # FLD AUTO: 4.87 K/UL

## 2024-03-14 DIAGNOSIS — R87.810 CERVICAL HIGH RISK HUMAN PAPILLOMAVIRUS (HPV) DNA TEST POSITIVE: ICD-10-CM

## 2024-03-18 ENCOUNTER — RX RENEWAL (OUTPATIENT)
Age: 44
End: 2024-03-18

## 2024-04-25 ENCOUNTER — RX RENEWAL (OUTPATIENT)
Age: 44
End: 2024-04-25

## 2024-05-21 ENCOUNTER — APPOINTMENT (OUTPATIENT)
Dept: CARDIOLOGY | Facility: CLINIC | Age: 44
End: 2024-05-21

## 2024-06-24 ENCOUNTER — APPOINTMENT (OUTPATIENT)
Dept: CARDIOLOGY | Facility: CLINIC | Age: 44
End: 2024-06-24
Payer: COMMERCIAL

## 2024-06-24 VITALS
HEART RATE: 77 BPM | BODY MASS INDEX: 21.79 KG/M2 | SYSTOLIC BLOOD PRESSURE: 129 MMHG | DIASTOLIC BLOOD PRESSURE: 83 MMHG | HEIGHT: 63 IN | WEIGHT: 123 LBS | OXYGEN SATURATION: 97 %

## 2024-06-24 DIAGNOSIS — I10 ESSENTIAL (PRIMARY) HYPERTENSION: ICD-10-CM

## 2024-06-24 PROCEDURE — 99202 OFFICE O/P NEW SF 15 MIN: CPT

## 2024-06-28 ENCOUNTER — APPOINTMENT (OUTPATIENT)
Dept: ULTRASOUND IMAGING | Facility: CLINIC | Age: 44
End: 2024-06-28
Payer: COMMERCIAL

## 2024-06-28 ENCOUNTER — OUTPATIENT (OUTPATIENT)
Dept: OUTPATIENT SERVICES | Facility: HOSPITAL | Age: 44
LOS: 1 days | End: 2024-06-28
Payer: COMMERCIAL

## 2024-06-28 ENCOUNTER — RESULT REVIEW (OUTPATIENT)
Age: 44
End: 2024-06-28

## 2024-06-28 ENCOUNTER — APPOINTMENT (OUTPATIENT)
Dept: MAMMOGRAPHY | Facility: CLINIC | Age: 44
End: 2024-06-28
Payer: COMMERCIAL

## 2024-06-28 DIAGNOSIS — Z98.890 OTHER SPECIFIED POSTPROCEDURAL STATES: Chronic | ICD-10-CM

## 2024-06-28 DIAGNOSIS — Z98.891 HISTORY OF UTERINE SCAR FROM PREVIOUS SURGERY: Chronic | ICD-10-CM

## 2024-06-28 DIAGNOSIS — Z01.411 ENCOUNTER FOR GYNECOLOGICAL EXAMINATION (GENERAL) (ROUTINE) WITH ABNORMAL FINDINGS: ICD-10-CM

## 2024-06-28 PROCEDURE — 76641 ULTRASOUND BREAST COMPLETE: CPT | Mod: 26,50

## 2024-06-28 PROCEDURE — 77067 SCR MAMMO BI INCL CAD: CPT | Mod: 26

## 2024-06-28 PROCEDURE — 77063 BREAST TOMOSYNTHESIS BI: CPT | Mod: 26

## 2024-06-28 PROCEDURE — 77067 SCR MAMMO BI INCL CAD: CPT

## 2024-06-28 PROCEDURE — 77063 BREAST TOMOSYNTHESIS BI: CPT

## 2024-06-28 PROCEDURE — 76641 ULTRASOUND BREAST COMPLETE: CPT

## 2024-07-09 ENCOUNTER — APPOINTMENT (OUTPATIENT)
Dept: ULTRASOUND IMAGING | Facility: CLINIC | Age: 44
End: 2024-07-09
Payer: COMMERCIAL

## 2024-07-09 ENCOUNTER — RESULT REVIEW (OUTPATIENT)
Age: 44
End: 2024-07-09

## 2024-07-09 ENCOUNTER — OUTPATIENT (OUTPATIENT)
Dept: OUTPATIENT SERVICES | Facility: HOSPITAL | Age: 44
LOS: 1 days | End: 2024-07-09
Payer: COMMERCIAL

## 2024-07-09 ENCOUNTER — APPOINTMENT (OUTPATIENT)
Dept: MAMMOGRAPHY | Facility: CLINIC | Age: 44
End: 2024-07-09
Payer: COMMERCIAL

## 2024-07-09 DIAGNOSIS — Z98.891 HISTORY OF UTERINE SCAR FROM PREVIOUS SURGERY: Chronic | ICD-10-CM

## 2024-07-09 DIAGNOSIS — Z98.890 OTHER SPECIFIED POSTPROCEDURAL STATES: Chronic | ICD-10-CM

## 2024-07-09 DIAGNOSIS — Z00.00 ENCOUNTER FOR GENERAL ADULT MEDICAL EXAMINATION WITHOUT ABNORMAL FINDINGS: ICD-10-CM

## 2024-07-09 PROCEDURE — 77065 DX MAMMO INCL CAD UNI: CPT

## 2024-07-09 PROCEDURE — G0279: CPT | Mod: 26

## 2024-07-09 PROCEDURE — 76642 ULTRASOUND BREAST LIMITED: CPT | Mod: 26,LT

## 2024-07-09 PROCEDURE — 77065 DX MAMMO INCL CAD UNI: CPT | Mod: 26,LT

## 2024-07-09 PROCEDURE — 76642 ULTRASOUND BREAST LIMITED: CPT

## 2024-07-09 PROCEDURE — G0279: CPT

## 2024-09-27 ENCOUNTER — APPOINTMENT (OUTPATIENT)
Dept: ULTRASOUND IMAGING | Facility: CLINIC | Age: 44
End: 2024-09-27

## 2024-12-21 NOTE — OB PROVIDER TRIAGE NOTE - NS_VISITREASON1_OBGYN_ALL_OB
Bed: 4  Expected date: 12/20/24  Expected time: 6:21 PM  Means of arrival:   Comments:  TRIAGE  
AFV, FHR, BPD Abnormalities

## 2025-04-02 ENCOUNTER — OUTPATIENT (OUTPATIENT)
Dept: OUTPATIENT SERVICES | Facility: HOSPITAL | Age: 45
LOS: 1 days | End: 2025-04-02
Payer: COMMERCIAL

## 2025-04-02 ENCOUNTER — APPOINTMENT (OUTPATIENT)
Dept: MRI IMAGING | Facility: CLINIC | Age: 45
End: 2025-04-02
Payer: COMMERCIAL

## 2025-04-02 DIAGNOSIS — Z00.00 ENCOUNTER FOR GENERAL ADULT MEDICAL EXAMINATION WITHOUT ABNORMAL FINDINGS: ICD-10-CM

## 2025-04-02 DIAGNOSIS — Z98.890 OTHER SPECIFIED POSTPROCEDURAL STATES: Chronic | ICD-10-CM

## 2025-04-02 DIAGNOSIS — Z98.891 HISTORY OF UTERINE SCAR FROM PREVIOUS SURGERY: Chronic | ICD-10-CM

## 2025-04-02 DIAGNOSIS — Z00.8 ENCOUNTER FOR OTHER GENERAL EXAMINATION: ICD-10-CM

## 2025-04-02 PROCEDURE — 70551 MRI BRAIN STEM W/O DYE: CPT | Mod: 26

## 2025-04-02 PROCEDURE — 70551 MRI BRAIN STEM W/O DYE: CPT
